# Patient Record
Sex: FEMALE | Race: WHITE
[De-identification: names, ages, dates, MRNs, and addresses within clinical notes are randomized per-mention and may not be internally consistent; named-entity substitution may affect disease eponyms.]

---

## 2017-02-22 ENCOUNTER — RECORD ABSTRACTING (OUTPATIENT)
Age: 82
End: 2017-02-22

## 2017-02-22 DIAGNOSIS — I10 ESSENTIAL (PRIMARY) HYPERTENSION: ICD-10-CM

## 2017-02-22 DIAGNOSIS — G47.33 OBSTRUCTIVE SLEEP APNEA (ADULT) (PEDIATRIC): ICD-10-CM

## 2017-02-22 DIAGNOSIS — Z92.89 PERSONAL HISTORY OF OTHER MEDICAL TREATMENT: ICD-10-CM

## 2017-02-22 DIAGNOSIS — I25.2 OLD MYOCARDIAL INFARCTION: ICD-10-CM

## 2017-02-22 DIAGNOSIS — E78.5 HYPERLIPIDEMIA, UNSPECIFIED: ICD-10-CM

## 2017-02-22 DIAGNOSIS — R06.00 DYSPNEA, UNSPECIFIED: ICD-10-CM

## 2017-02-22 DIAGNOSIS — Z78.9 OTHER SPECIFIED HEALTH STATUS: ICD-10-CM

## 2017-03-07 ENCOUNTER — APPOINTMENT (OUTPATIENT)
Dept: ELECTROPHYSIOLOGY | Facility: CLINIC | Age: 82
End: 2017-03-07

## 2017-04-18 ENCOUNTER — APPOINTMENT (OUTPATIENT)
Dept: ELECTROPHYSIOLOGY | Facility: CLINIC | Age: 82
End: 2017-04-18

## 2017-08-16 ENCOUNTER — APPOINTMENT (OUTPATIENT)
Dept: ELECTROPHYSIOLOGY | Facility: CLINIC | Age: 82
End: 2017-08-16
Payer: MEDICARE

## 2017-08-16 VITALS
SYSTOLIC BLOOD PRESSURE: 158 MMHG | BODY MASS INDEX: 35.08 KG/M2 | WEIGHT: 198 LBS | HEART RATE: 81 BPM | HEIGHT: 63 IN | DIASTOLIC BLOOD PRESSURE: 100 MMHG

## 2017-08-16 PROCEDURE — 93281 PM DEVICE PROGR EVAL MULTI: CPT

## 2017-11-30 ENCOUNTER — APPOINTMENT (OUTPATIENT)
Dept: ELECTROPHYSIOLOGY | Facility: CLINIC | Age: 82
End: 2017-11-30
Payer: MEDICARE

## 2017-11-30 VITALS
BODY MASS INDEX: 35.08 KG/M2 | DIASTOLIC BLOOD PRESSURE: 82 MMHG | SYSTOLIC BLOOD PRESSURE: 149 MMHG | HEART RATE: 77 BPM | WEIGHT: 198 LBS | HEIGHT: 63 IN

## 2017-11-30 PROCEDURE — 93280 PM DEVICE PROGR EVAL DUAL: CPT

## 2018-02-21 NOTE — H&P ADULT - ASSESSMENT
82 yo F with PMHx of HTN, DERIAN, Afib ,s/p PPM has been c/o     & had (+) stress test  Pt referred for C with possible intervention.

## 2018-02-21 NOTE — H&P ADULT - PMH
Afib    HTN (hypertension)    Sleep apnea Afib    Carotid artery disease    HTN (hypertension)    Hyperlipidemia    Pacemaker    Sleep apnea    SSS (sick sinus syndrome)

## 2018-02-21 NOTE — H&P ADULT - NSHPPHYSICALEXAM_GEN_ALL_CORE
Vital Signs : BP        HR       RR    Constitutional: well developed, well nourished, no deformities and no acute distress    Neurological: Alert & Oriented x 3, FARIA, no focal deficits    HEENT: NC/AT, PERRLA, EOMI,  Neck supple.    Respiratory: CTA B/L, No wheezing/crackles/rhonchi    Cardiovascular: (+) S1 & S2, RRR, No m/r/g    Gastrointestinal: soft, NT, nondistended, (+) BS    Genitourinary: non distended bladder, voiding freely    Extremities: No pedal edema, No clubbing, No cyanosis    Skin:  normal skin color and pigmentation, no skin lesions Constitutional: well developed, well nourished, no deformities and no acute distress    Neurological: Alert & Oriented x 3, FARIA, no focal deficits    HEENT: NC/AT, PERRLA, EOMI,  Neck supple.    Respiratory: CTA B/L, No wheezing/crackles/rhonchi    Cardiovascular: (+) S1 & S2, RRR, No m/r/g    Gastrointestinal: soft, NT, nondistended, (+) BS    Genitourinary: non distended bladder, voiding freely    Extremities: + trace pedal edema, + discoloration, No clubbing, No cyanosis    Skin:  normal skin color and pigmentation, no skin lesions

## 2018-02-21 NOTE — H&P ADULT - PROBLEM SELECTOR PLAN 1
Pt is referred for Lt heart cath/possible PCI. Labs & medications are reviewed. Informed consent obtained after discussion of [facility] risks, benefits and alternatives  with patient. Risk discussed included, but not limited to MI, stroke, mortality, major bleeding, arrhythmia, or infection.  An educational material provided. Pt. verbalizes understandings of pre-procedural instructions.

## 2018-02-21 NOTE — H&P ADULT - FAMILY HISTORY
Father  Still living? Unknown  CAD (coronary artery disease), Age at diagnosis: Age Unknown     Mother  Still living? No  CAD (coronary artery disease), Age at diagnosis: Age Unknown     Sibling  Still living? Unknown  Family history of breast cancer in sister, Age at diagnosis: Age Unknown

## 2018-02-21 NOTE — H&P ADULT - HISTORY OF PRESENT ILLNESS
82 yo F with PMHx of HTN, DERIAN, Afib ,s/p PPM has been c/o     & had (+) stress test  Pt referred for C with possible intervention. 84 yo F with PMHx of HTN, DERIAN, Afib ,s/p PPM has been c/o fatigue and mild shortness of breath    & had (+) stress test revealing apical wall ischemia  Pt referred for LHC with possible intervention.

## 2018-02-21 NOTE — H&P ADULT - NSHPREVIEWOFSYSTEMS_GEN_ALL_CORE
General: Pt denies recent weight loss/fever/chills    Neurological: denies numbness or  sensation loss    HEENT: denies visual changes, no hearing loss, denies sore throat    Cardiovascular: denies chest pain/palpitations/leg edema    Respiratory and Thorax: denies SOB/cough/wheezing    Gastrointestinal: denies abdominal pain/diarrhea/constipation/bloody stool    Genitourinary: denies urinary frequency/urgency/ dysuria    Musculoskeletal: denies joint pain or swelling, denies restricted motion    Skin: denies rashes/sores    Endocrine: denies heat or cold intolerance/excessive thirst    Hematologic: denies abnormal bleeding General: Pt denies recent weight loss/fever/chills    Neurological: denies numbness or  sensation loss    HEENT: denies visual changes, no hearing loss, denies sore throat    Cardiovascular: denies chest pain/palpitations/  + leg edema    Respiratory and Thorax: + SOB, denies cough/wheezing    Gastrointestinal: denies abdominal pain/diarrhea/constipation/bloody stool    Genitourinary: denies urinary frequency/urgency/ dysuria    Musculoskeletal: + joint pain, + swelling, + restricted motion (uses cane)    Skin: denies rashes/sores    Endocrine: denies heat or cold intolerance/excessive thirst    Hematologic: denies abnormal bleeding

## 2018-02-22 ENCOUNTER — OUTPATIENT (OUTPATIENT)
Dept: OUTPATIENT SERVICES | Facility: HOSPITAL | Age: 83
LOS: 1 days | Discharge: HOME | End: 2018-02-22
Payer: MEDICARE

## 2018-02-22 VITALS
HEIGHT: 63 IN | OXYGEN SATURATION: 97 % | DIASTOLIC BLOOD PRESSURE: 64 MMHG | WEIGHT: 197.98 LBS | SYSTOLIC BLOOD PRESSURE: 133 MMHG | HEART RATE: 60 BPM | RESPIRATION RATE: 16 BRPM | TEMPERATURE: 99 F

## 2018-02-22 DIAGNOSIS — Z95.0 PRESENCE OF CARDIAC PACEMAKER: Chronic | ICD-10-CM

## 2018-02-22 LAB
BLD GP AB SCN SERPL QL: SIGNIFICANT CHANGE UP
TYPE + AB SCN PNL BLD: SIGNIFICANT CHANGE UP

## 2018-02-22 PROCEDURE — 93010 ELECTROCARDIOGRAM REPORT: CPT | Mod: 76

## 2018-02-22 RX ORDER — CARVEDILOL PHOSPHATE 80 MG/1
12.5 CAPSULE, EXTENDED RELEASE ORAL EVERY 12 HOURS
Qty: 0 | Refills: 0 | Status: DISCONTINUED | OUTPATIENT
Start: 2018-02-22 | End: 2018-02-23

## 2018-02-22 RX ORDER — ASPIRIN/CALCIUM CARB/MAGNESIUM 324 MG
81 TABLET ORAL DAILY
Qty: 0 | Refills: 0 | Status: DISCONTINUED | OUTPATIENT
Start: 2018-02-23 | End: 2018-02-23

## 2018-02-22 RX ORDER — WARFARIN SODIUM 2.5 MG/1
3 TABLET ORAL ONCE
Qty: 0 | Refills: 0 | Status: COMPLETED | OUTPATIENT
Start: 2018-02-22 | End: 2018-02-22

## 2018-02-22 RX ORDER — LOSARTAN POTASSIUM 100 MG/1
100 TABLET, FILM COATED ORAL DAILY
Qty: 0 | Refills: 0 | Status: DISCONTINUED | OUTPATIENT
Start: 2018-02-22 | End: 2018-02-23

## 2018-02-22 RX ORDER — POTASSIUM CHLORIDE 20 MEQ
10 PACKET (EA) ORAL DAILY
Qty: 0 | Refills: 0 | Status: DISCONTINUED | OUTPATIENT
Start: 2018-02-22 | End: 2018-02-23

## 2018-02-22 RX ORDER — ACETAMINOPHEN 500 MG
650 TABLET ORAL EVERY 6 HOURS
Qty: 0 | Refills: 0 | Status: DISCONTINUED | OUTPATIENT
Start: 2018-02-22 | End: 2018-02-23

## 2018-02-22 RX ORDER — FUROSEMIDE 40 MG
40 TABLET ORAL DAILY
Qty: 0 | Refills: 0 | Status: DISCONTINUED | OUTPATIENT
Start: 2018-02-22 | End: 2018-02-23

## 2018-02-22 RX ORDER — SODIUM CHLORIDE 9 MG/ML
1000 INJECTION INTRAMUSCULAR; INTRAVENOUS; SUBCUTANEOUS
Qty: 0 | Refills: 0 | Status: DISCONTINUED | OUTPATIENT
Start: 2018-02-22 | End: 2018-02-23

## 2018-02-22 RX ORDER — ATORVASTATIN CALCIUM 80 MG/1
20 TABLET, FILM COATED ORAL AT BEDTIME
Qty: 0 | Refills: 0 | Status: DISCONTINUED | OUTPATIENT
Start: 2018-02-22 | End: 2018-02-23

## 2018-02-22 RX ORDER — CLOPIDOGREL BISULFATE 75 MG/1
75 TABLET, FILM COATED ORAL DAILY
Qty: 0 | Refills: 0 | Status: DISCONTINUED | OUTPATIENT
Start: 2018-02-23 | End: 2018-02-23

## 2018-02-22 RX ADMIN — Medication 10 MILLIEQUIVALENT(S): at 13:18

## 2018-02-22 RX ADMIN — WARFARIN SODIUM 3 MILLIGRAM(S): 2.5 TABLET ORAL at 21:13

## 2018-02-22 RX ADMIN — ATORVASTATIN CALCIUM 20 MILLIGRAM(S): 80 TABLET, FILM COATED ORAL at 21:13

## 2018-02-22 RX ADMIN — CARVEDILOL PHOSPHATE 12.5 MILLIGRAM(S): 80 CAPSULE, EXTENDED RELEASE ORAL at 18:05

## 2018-02-22 NOTE — ASU PATIENT PROFILE, ADULT - PMH
Afib    Carotid artery disease    HTN (hypertension)    Hyperlipidemia    Pacemaker    Sleep apnea    SSS (sick sinus syndrome)

## 2018-02-22 NOTE — ASU PATIENT PROFILE, ADULT - NS TRANSFER PATIENT BELONGINGS
Other belongings/Clothing/3 gold colored rings; 1 gold colored necklace; 1 black cane/Jewelry/Money (specify)

## 2018-02-22 NOTE — PROGRESS NOTE ADULT - SUBJECTIVE AND OBJECTIVE BOX
Nurse Practitioner Progress note:   s/p [facility] with successful PCI and STEFANIE to ostial RCA      Patient feels well.  Denies chest pain, shortness of breath, dizziness or palpitations at this time    Right groin procedure site PERCLOSE dressing CDI.  no bleeding, no hematoma, site soft, non tender, positive pedal pulses bilaterally        T(C): 37.1 (02-22-18 @ 08:04), Max: 37.1 (02-22-18 @ 08:04)  HR: 60 (02-22-18 @ 10:50) (60 - 61)  BP: 144/64 (02-22-18 @ 10:50) (133/64 - 144/72)  RR: 16 (02-22-18 @ 10:50) (16 - 16)  SpO2: 95% (02-22-18 @ 10:50) (95% - 97%)      MEDICATIONS  (STANDING):  aspirin 81mg PO daily  plavix 75mg PO daily  atorvastatin 20 milliGRAM(s) Oral at bedtime  carvedilol 12.5 milliGRAM(s) Oral every 12 hours  furosemide    Tablet 40 milliGRAM(s) Oral daily  losartan 100 milliGRAM(s) Oral daily  potassium chloride    Tablet ER 10 milliEquivalent(s) Oral daily  sodium chloride 0.9%. 1000 milliLiter(s) (75 mL/Hr) IV Continuous <Continuous>  warfarin 3 milliGRAM(s) Oral once      HPI:  84 yo F with PMHx of HTN, DERIAN, Afib ,s/p PPM has been c/o fatigue and mild shortness of breath    & had (+) stress test revealing apical wall ischemia  s/p [facility] with successful PCI and STEFANIE to ostial RCA      ASSESSMENT/PLAN:    Coronary artery disease  -observation overnight as outpatient in CICU  -VS, labs, diet, activity as per PCI orders  -IV hydration  -Encourage PO fluids  -continue DAPT, BB, ARB, statin, diuretic, potassium and restart coumadin tonight  -Plan of care discussed with patient, daughter and MD Rodriguez  -likely d/c in AM if patient remains stable overnight  -NP to see in AM to evaluate labs, EKG and procedure site check  -Follow-up with attending MD De León within 1 week  -Discussed therapeutic lifestyle changes to reduce risk factors such as following a cardiac diet, weight loss, maintaining a healthy weight, exercise, smoking cessation, medication compliance, and regular follow-up  with MD to know your numbers (BP, cholesterol, weight, and glucose)

## 2018-02-22 NOTE — PACU DISCHARGE NOTE - COMMENTS
Report given to Nidia, receiving RN on CICU.  Pt. transferred to inpt. room in CICU on cardiac monitor via stretcher accompanied by RN and transport tech.

## 2018-02-23 ENCOUNTER — TRANSCRIPTION ENCOUNTER (OUTPATIENT)
Age: 83
End: 2018-02-23

## 2018-02-23 VITALS — DIASTOLIC BLOOD PRESSURE: 59 MMHG | SYSTOLIC BLOOD PRESSURE: 130 MMHG | HEART RATE: 61 BPM

## 2018-02-23 LAB
ANION GAP SERPL CALC-SCNC: 6 MMOL/L — SIGNIFICANT CHANGE UP (ref 5–17)
BASOPHILS # BLD AUTO: 0.1 K/UL — SIGNIFICANT CHANGE UP (ref 0–0.2)
BASOPHILS NFR BLD AUTO: 1 % — SIGNIFICANT CHANGE UP (ref 0–2)
BUN SERPL-MCNC: 22 MG/DL — SIGNIFICANT CHANGE UP (ref 7–23)
CALCIUM SERPL-MCNC: 8.9 MG/DL — SIGNIFICANT CHANGE UP (ref 8.5–10.1)
CHLORIDE SERPL-SCNC: 110 MMOL/L — HIGH (ref 96–108)
CHOLEST SERPL-MCNC: 112 MG/DL — SIGNIFICANT CHANGE UP (ref 10–199)
CO2 SERPL-SCNC: 24 MMOL/L — SIGNIFICANT CHANGE UP (ref 22–31)
CREAT SERPL-MCNC: 0.7 MG/DL — SIGNIFICANT CHANGE UP (ref 0.5–1.3)
EOSINOPHIL # BLD AUTO: 0.2 K/UL — SIGNIFICANT CHANGE UP (ref 0–0.5)
EOSINOPHIL NFR BLD AUTO: 1.7 % — SIGNIFICANT CHANGE UP (ref 0–6)
GLUCOSE SERPL-MCNC: 111 MG/DL — HIGH (ref 70–99)
HCT VFR BLD CALC: 39.5 % — SIGNIFICANT CHANGE UP (ref 34.5–45)
HDLC SERPL-MCNC: 39 MG/DL — LOW (ref 40–125)
HGB BLD-MCNC: 13 G/DL — SIGNIFICANT CHANGE UP (ref 11.5–15.5)
INR BLD: 1.6 RATIO — HIGH (ref 0.88–1.16)
LIPID PNL WITH DIRECT LDL SERPL: 56 MG/DL — SIGNIFICANT CHANGE UP
LYMPHOCYTES # BLD AUTO: 1.1 K/UL — SIGNIFICANT CHANGE UP (ref 1–3.3)
LYMPHOCYTES # BLD AUTO: 11.6 % — LOW (ref 13–44)
MCHC RBC-ENTMCNC: 30.2 PG — SIGNIFICANT CHANGE UP (ref 27–34)
MCHC RBC-ENTMCNC: 33 GM/DL — SIGNIFICANT CHANGE UP (ref 32–36)
MCV RBC AUTO: 91.6 FL — SIGNIFICANT CHANGE UP (ref 80–100)
MONOCYTES # BLD AUTO: 0.7 K/UL — SIGNIFICANT CHANGE UP (ref 0–0.9)
MONOCYTES NFR BLD AUTO: 7.1 % — SIGNIFICANT CHANGE UP (ref 2–14)
NEUTROPHILS # BLD AUTO: 7.3 K/UL — SIGNIFICANT CHANGE UP (ref 1.8–7.4)
NEUTROPHILS NFR BLD AUTO: 78.5 % — HIGH (ref 43–77)
PLATELET # BLD AUTO: 244 K/UL — SIGNIFICANT CHANGE UP (ref 150–400)
POTASSIUM SERPL-MCNC: 4.3 MMOL/L — SIGNIFICANT CHANGE UP (ref 3.5–5.3)
POTASSIUM SERPL-SCNC: 4.3 MMOL/L — SIGNIFICANT CHANGE UP (ref 3.5–5.3)
PROTHROM AB SERPL-ACNC: 17.4 SEC — HIGH (ref 9.8–12.7)
RBC # BLD: 4.31 M/UL — SIGNIFICANT CHANGE UP (ref 3.8–5.2)
RBC # FLD: 16.2 % — HIGH (ref 10.3–14.5)
SODIUM SERPL-SCNC: 140 MMOL/L — SIGNIFICANT CHANGE UP (ref 135–145)
TOTAL CHOLESTEROL/HDL RATIO MEASUREMENT: 2.9 RATIO — LOW (ref 3.3–7.1)
TRIGL SERPL-MCNC: 86 MG/DL — SIGNIFICANT CHANGE UP (ref 10–149)
WBC # BLD: 9.3 K/UL — SIGNIFICANT CHANGE UP (ref 3.8–10.5)
WBC # FLD AUTO: 9.3 K/UL — SIGNIFICANT CHANGE UP (ref 3.8–10.5)

## 2018-02-23 PROCEDURE — 93010 ELECTROCARDIOGRAM REPORT: CPT

## 2018-02-23 RX ORDER — CLOPIDOGREL BISULFATE 75 MG/1
1 TABLET, FILM COATED ORAL
Qty: 30 | Refills: 6
Start: 2018-02-23 | End: 2018-09-20

## 2018-02-23 RX ORDER — ASPIRIN/CALCIUM CARB/MAGNESIUM 324 MG
1 TABLET ORAL
Qty: 0 | Refills: 0 | DISCHARGE
Start: 2018-02-23

## 2018-02-23 RX ADMIN — Medication 10 MILLIEQUIVALENT(S): at 11:01

## 2018-02-23 RX ADMIN — CARVEDILOL PHOSPHATE 12.5 MILLIGRAM(S): 80 CAPSULE, EXTENDED RELEASE ORAL at 11:01

## 2018-02-23 RX ADMIN — CLOPIDOGREL BISULFATE 75 MILLIGRAM(S): 75 TABLET, FILM COATED ORAL at 11:01

## 2018-02-23 RX ADMIN — LOSARTAN POTASSIUM 100 MILLIGRAM(S): 100 TABLET, FILM COATED ORAL at 11:01

## 2018-02-23 RX ADMIN — Medication 40 MILLIGRAM(S): at 05:59

## 2018-02-23 RX ADMIN — Medication 81 MILLIGRAM(S): at 11:01

## 2018-02-23 NOTE — DISCHARGE NOTE ADULT - HOSPITAL COURSE
82 yo F with PMHx of HTN, DERIAN, Afib ,s/p PPM has been c/o fatigue and mild shortness of breath & had (+) stress test revealing apical wall ischemia.  S/P LHC

## 2018-02-23 NOTE — DISCHARGE NOTE ADULT - OTHER SIGNIFICANT FINDINGS
Cardiac Cath Lab - Adult (02.22.18 @ 09:56) >  Impression     Diagnostic Conclusions   One Vessel coronary artery disease (RCA) .   Normal LV systolic function. Estimated LV ejection fraction is 65 %.   No aortic valve stenosis.     Interventional Conclusions     Successful Coronary Intervention STEFANIE of ostial RCA.     Recommendations     Percutaneous coronary intervention of RCA today - symptomatic stenosis.     Aggressive medical management of coronary artery disease and its   underlying risk factors.

## 2018-02-23 NOTE — DISCHARGE NOTE ADULT - CARE PROVIDER_API CALL
Johanna De León), Cardiac Electrophysiology; Cardiovascular Disease; Internal Medicine  [address] [place] [address]  Phone: [phone]  Fax: [phone]

## 2018-02-23 NOTE — DISCHARGE NOTE ADULT - PLAN OF CARE
Pt. will remain chest pain free Continue to take ASA   Continue to take Plavix  Follow-up with cardiologist

## 2018-02-23 NOTE — DISCHARGE NOTE ADULT - PATIENT PORTAL LINK FT
You can access the [facility] Patient Portal, offered by [facility], by registering with the following website: [email or web address]

## 2018-02-23 NOTE — DISCHARGE NOTE ADULT - CARE PLAN
Principal Discharge DX:	CAD (coronary artery disease)  Goal:	Pt. will remain chest pain free  Assessment and plan of treatment:	Continue to take ASA   Continue to take Plavix  Follow-up with cardiologist

## 2018-02-23 NOTE — PROGRESS NOTE ADULT - ASSESSMENT
ASSESSMENT/PLAN: 	  84 yo F with PMHx of HTN, DERIAN, Afib ,s/p PPM has been c/o fatigue and mild shortness of breath & had (+) stress test revealing apical wall ischemia.  S/P C     -Aggressive medical management of coronary artery disease and its underlying risk factors.  -Aspirin 81 mg PO daily.  -(Plavix) 75 mg PO daily.  -Restart Coumadin  -Once INR >2 stop aspirin.  -Continue current medications  -Cozaar 100 mg   -Coreg 12.5 mg BID  -Lipitor 20 mg   -Pt. to be discharged home today  -Plan of care D/W pt. and MD  -Post cath instructions reviewed with pt., pt. verbalizes and understands instructions  -Follow-up with cardiologist in 7-10 days ASSESSMENT/PLAN: 	  82 yo F with PMHx of HTN, DERIAN, Afib ,s/p PPM has been c/o fatigue and mild shortness of breath & had (+) stress test revealing apical wall ischemia.  S/P LHC     -Aggressive medical management of coronary artery disease and its underlying risk factors.  -Aspirin 81 mg PO daily.  -(Plavix) 75 mg PO daily.  -Restart Coumadin  -Once INR >2 stop aspirin.  -Continue current medications  -Cozaar 100 mg   -Coreg 12.5 mg BID  -Lipitor 20 mg   -Pt. to be discharged home today  -Plan of care D/W pt. and MD  -Post cath instructions reviewed with pt., pt. verbalizes and understands instructions  -Discussed therapeutic lifestyle changes to reduce risk factors such as following a cardiac diet, weight loss, maintaining a healthy weight, exercise, smoking cessation, medication compliance, and regular follow-up  with MD to know our numbers (BP, cholesterol, weight, and glucose  -Follow-up with cardiologist in 7-10 days

## 2018-02-23 NOTE — DISCHARGE NOTE ADULT - MEDICATION SUMMARY - MEDICATIONS TO TAKE
I will START or STAY ON the medications listed below when I get home from the hospital:    aspirin 81 mg oral delayed release tablet  -- 1 tab(s) by mouth once a day  -- Indication: For CAD (coronary artery disease)    losartan 100 mg oral tablet  -- 1 tab(s) by mouth once a day  -- Indication: For HTN (hypertension)    Coumadin 3 mg oral tablet  -- 1 tab(s) by mouth every other day; last taken Sat.  -- Indication: For Afib    Coumadin 2.5 mg oral tablet  -- 1 tab(s) by mouth every other day  -- Indication: For Afib    rosuvastatin 5 mg oral tablet  -- 1 tab(s) by mouth once a day (at bedtime)  -- Indication: For HLD    clopidogrel 75 mg oral tablet  -- 1 tab(s) by mouth once a day  -- Indication: For CAD (coronary artery disease)    carvedilol 12.5 mg oral tablet  -- 1 tab(s) by mouth 2 times a day  -- Indication: For HTN (hypertension)    furosemide 40 mg oral tablet  -- 1 tab(s) by mouth once a day  -- Indication: For HTN (hypertension)    Micro-K  -- 10 milliequivalent(s) by mouth once a day  -- Indication: For Supplement    CoQ10  -- 100 milligram(s) by mouth once a day  -- Indication: For Supplement    latanoprost 0.005% ophthalmic solution  -- 1 drop(s) to each affected eye once a day (in the evening)  -- Indication: For glaucoma

## 2018-02-23 NOTE — PROGRESS NOTE ADULT - SUBJECTIVE AND OBJECTIVE BOX
Nurse Practitioner Progress note:     HPI:  82 yo F with PMHx of HTN, DERIAN, Afib ,s/p PPM has been c/o fatigue and mild shortness of breath & had (+) stress test revealing apical wall ischemia  Pt referred for [facility] with possible intervention. (21 Feb 2018 10:36)      T(C): 36.4 (02-22-18 @ 23:35), Max: 37.1 (02-22-18 @ 08:04)  HR: 60 (02-23-18 @ 06:00) (60 - 68)  BP: 104/81 (02-23-18 @ 06:00) (102/54 - 147/69)  RR: 16 (02-22-18 @ 14:00) (16 - 18)  SpO2: 91% (02-23-18 @ 06:00) (91% - 97%)  Wt(kg): --    PHYSICAL EXAM:  Neurologic: Non-focal, AxOx3.  No neuro deficits  Vascular: Peripheral pulses palpable 2+ bilaterally  Procedure Site: Rt. groin perclose closure device site benign soft no bleeding no hematoma +1PP    12 lead EKG:  	    LABS:	 	      PROCEDURE RESULTS:   Cardiac Cath Lab - Adult (02.22.18 @ 09:56) >   Diagnostic Conclusions   One Vessel coronary artery disease (RCA) .   Normal LV systolic function. Estimated LV ejection fraction is 65 %.   No aortic valve stenosis.   Interventional Conclusions   Successful Coronary Intervention STEFANIE of ostial RCA.   Percutaneous coronary intervention of RCA today - symptomatic stenosis.      ASSESSMENT/PLAN: 	  82 yo F with PMHx of HTN, DERIAN, Afib ,s/p PPM has been c/o fatigue and mild shortness of breath & had (+) stress test revealing apical wall ischemia.  S/P LHC     -Aggressive medical management of coronary artery disease and its underlying risk factors.  -Aspirin 81 mg PO daily.  -(Plavix) 75 mg PO daily.  -Restart Coumadin  -Once INR >2 stop aspirin.  -Continue current medications  -Cozaar 100 mg   -Coreg 12.5 mg BID  -Lipitor 20 mg   -Pt. to be discharged home today  -Plan of care D/W pt. and MD  -Post cath instructions reviewed with pt., pt. verbalizes and understands instructions  -Follow-up with cardiologist in 7-10 days Nurse Practitioner Progress note:     HPI:  82 yo F with PMHx of HTN, DERIAN, Afib ,s/p PPM has been c/o fatigue and mild shortness of breath & had (+) stress test revealing apical wall ischemia  Pt referred for [facility] with possible intervention. (21 Feb 2018 10:36)      T(C): 36.4 (02-22-18 @ 23:35), Max: 37.1 (02-22-18 @ 08:04)  HR: 60 (02-23-18 @ 06:00) (60 - 68)  BP: 104/81 (02-23-18 @ 06:00) (102/54 - 147/69)  RR: 16 (02-22-18 @ 14:00) (16 - 18)  SpO2: 91% (02-23-18 @ 06:00) (91% - 97%)  Wt(kg): --    PHYSICAL EXAM:  Neurologic: Non-focal, AxOx3.  No neuro deficits  Vascular: Peripheral pulses palpable 2+ bilaterally  Procedure Site: Rt. groin perclose closure device site benign soft no bleeding no hematoma +1PP    12 lead EKG:  	    LABS:	 	                        13.0   9.3   )-----------( 244      ( 23 Feb 2018 04:45 )             39.5   02-23    140  |  110<H>  |  22  ----------------------------<  111<H>  4.3   |  24  |  0.70    Ca    8.9      23 Feb 2018 04:45         PROCEDURE RESULTS:   Cardiac Cath Lab - Adult (02.22.18 @ 09:56) >   Diagnostic Conclusions   One Vessel coronary artery disease (RCA) .   Normal LV systolic function. Estimated LV ejection fraction is 65 %.   No aortic valve stenosis.   Interventional Conclusions   Successful Coronary Intervention STEFANIE of ostial RCA.   Percutaneous coronary intervention of RCA today - symptomatic stenosis. Nurse Practitioner Progress note:     HPI:  84 yo F with PMHx of HTN, DERIAN, Afib ,s/p PPM has been c/o fatigue and mild shortness of breath & had (+) stress test revealing apical wall ischemia  Pt referred for [facility] with possible intervention. (21 Feb 2018 10:36)      T(C): 36.4 (02-22-18 @ 23:35), Max: 37.1 (02-22-18 @ 08:04)  HR: 60 (02-23-18 @ 06:00) (60 - 68)  BP: 104/81 (02-23-18 @ 06:00) (102/54 - 147/69)  RR: 16 (02-22-18 @ 14:00) (16 - 18)  SpO2: 91% (02-23-18 @ 06:00) (91% - 97%)  Wt(kg): --    PHYSICAL EXAM:  Neurologic: Non-focal, AxOx3.  No neuro deficits  Vascular: Peripheral pulses palpable 2+ bilaterally  Procedure Site: Rt. groin perclose closure device site benign soft no bleeding no hematoma +1PP    12 lead EKG:  	    Overnight on Telemetry:     LABS:	 	                        13.0   9.3   )-----------( 244      ( 23 Feb 2018 04:45 )             39.5   02-23    140  |  110<H>  |  22  ----------------------------<  111<H>  4.3   |  24  |  0.70    Ca    8.9      23 Feb 2018 04:45         PROCEDURE RESULTS:   Cardiac Cath Lab - Adult (02.22.18 @ 09:56) >   Diagnostic Conclusions   One Vessel coronary artery disease (RCA) .   Normal LV systolic function. Estimated LV ejection fraction is 65 %.   No aortic valve stenosis.   Interventional Conclusions   Successful Coronary Intervention STEFANIE of ostial RCA.   Percutaneous coronary intervention of RCA today - symptomatic stenosis. Nurse Practitioner Progress note:     HPI:  84 yo F with PMHx of HTN, DERIAN, Afib ,s/p PPM has been c/o fatigue and mild shortness of breath & had (+) stress test revealing apical wall ischemia  Pt referred for [facility] with possible intervention. (21 Feb 2018 10:36)      T(C): 36.4 (02-22-18 @ 23:35), Max: 37.1 (02-22-18 @ 08:04)  HR: 60 (02-23-18 @ 06:00) (60 - 68)  BP: 104/81 (02-23-18 @ 06:00) (102/54 - 147/69)  RR: 16 (02-22-18 @ 14:00) (16 - 18)  SpO2: 91% (02-23-18 @ 06:00) (91% - 97%)  Wt(kg): --    PHYSICAL EXAM:  Neurologic: Non-focal, AxOx3.  No neuro deficits  Resp:   Vascular: Peripheral pulses palpable 2+ bilaterally  Procedure Site: Rt. groin perclose closure device site benign soft no bleeding no hematoma +1PP    12 lead EKG:  	    Overnight on Telemetry:     LABS:	 	                        13.0   9.3   )-----------( 244      ( 23 Feb 2018 04:45 )             39.5   02-23    140  |  110<H>  |  22  ----------------------------<  111<H>  4.3   |  24  |  0.70    Ca    8.9      23 Feb 2018 04:45         PROCEDURE RESULTS:   Cardiac Cath Lab - Adult (02.22.18 @ 09:56) >   Diagnostic Conclusions   One Vessel coronary artery disease (RCA) .   Normal LV systolic function. Estimated LV ejection fraction is 65 %.   No aortic valve stenosis.   Interventional Conclusions   Successful Coronary Intervention STEFANIE of ostial RCA.   Percutaneous coronary intervention of RCA today - symptomatic stenosis. Nurse Practitioner Progress note:     HPI:  82 yo F with PMHx of HTN, DERIAN, Afib ,s/p PPM has been c/o fatigue and mild shortness of breath & had (+) stress test revealing apical wall ischemia  Pt referred for [facility] with possible intervention. (21 Feb 2018 10:36)      T(C): 36.4 (02-22-18 @ 23:35), Max: 37.1 (02-22-18 @ 08:04)  HR: 60 (02-23-18 @ 06:00) (60 - 68)  BP: 104/81 (02-23-18 @ 06:00) (102/54 - 147/69)  RR: 16 (02-22-18 @ 14:00) (16 - 18)  SpO2: 91% (02-23-18 @ 06:00) (91% - 97%)  Wt(kg): --    PHYSICAL EXAM:  Neurologic: Non-focal, AxOx3.  No neuro deficits  Resp:   Cardiovascular:  Vascular: Peripheral pulses palpable 2+ bilaterally  Procedure Site: Rt. groin perclose closure device site benign soft no bleeding no hematoma +1PP    12 lead EKG:  	    Overnight on Telemetry:     LABS:	 	                        13.0   9.3   )-----------( 244      ( 23 Feb 2018 04:45 )             39.5   02-23    140  |  110<H>  |  22  ----------------------------<  111<H>  4.3   |  24  |  0.70    Ca    8.9      23 Feb 2018 04:45         PROCEDURE RESULTS:   Cardiac Cath Lab - Adult (02.22.18 @ 09:56) >   Diagnostic Conclusions   One Vessel coronary artery disease (RCA) .   Normal LV systolic function. Estimated LV ejection fraction is 65 %.   No aortic valve stenosis.   Interventional Conclusions   Successful Coronary Intervention STEFANIE of ostial RCA.   Percutaneous coronary intervention of RCA today - symptomatic stenosis. Nurse Practitioner Progress note:     HPI:  82 yo F with PMHx of HTN, DERIAN, Afib ,s/p PPM has been c/o fatigue and mild shortness of breath & had (+) stress test revealing apical wall ischemia  Pt referred for [facility] with possible intervention. (21 Feb 2018 10:36)      T(C): 36.4 (02-22-18 @ 23:35), Max: 37.1 (02-22-18 @ 08:04)  HR: 60 (02-23-18 @ 06:00) (60 - 68)  BP: 104/81 (02-23-18 @ 06:00) (102/54 - 147/69)  RR: 16 (02-22-18 @ 14:00) (16 - 18)  SpO2: 91% (02-23-18 @ 06:00) (91% - 97%)  Wt(kg): --    PHYSICAL EXAM:  Neurologic: Non-focal, AxOx3.  No neuro deficits  Resp: CTA  Cardiovascular: S1, S2  Vascular: Peripheral pulses palpable 2+ bilaterally  Procedure Site: Rt. groin perclose closure device site benign soft no bleeding no hematoma +1PP    12 lead EKG:  Paced 60bpm	    Overnight on Telemetry: Paced 60's-80's     LABS:	 	                        13.0   9.3   )-----------( 244      ( 23 Feb 2018 04:45 )             39.5   02-23    140  |  110<H>  |  22  ----------------------------<  111<H>  4.3   |  24  |  0.70    Ca    8.9      23 Feb 2018 04:45         PROCEDURE RESULTS:   Cardiac Cath Lab - Adult (02.22.18 @ 09:56) >   Diagnostic Conclusions   One Vessel coronary artery disease (RCA) .   Normal LV systolic function. Estimated LV ejection fraction is 65 %.   No aortic valve stenosis.   Interventional Conclusions   Successful Coronary Intervention STEFANIE of ostial RCA.   Percutaneous coronary intervention of RCA today - symptomatic stenosis.

## 2018-03-01 ENCOUNTER — APPOINTMENT (OUTPATIENT)
Dept: ELECTROPHYSIOLOGY | Facility: CLINIC | Age: 83
End: 2018-03-01
Payer: MEDICARE

## 2018-03-01 VITALS — HEART RATE: 85 BPM | HEIGHT: 63 IN | DIASTOLIC BLOOD PRESSURE: 93 MMHG | SYSTOLIC BLOOD PRESSURE: 156 MMHG

## 2018-03-01 PROBLEM — G47.30 SLEEP APNEA, UNSPECIFIED: Chronic | Status: ACTIVE | Noted: 2018-02-21

## 2018-03-01 PROBLEM — I48.91 UNSPECIFIED ATRIAL FIBRILLATION: Chronic | Status: ACTIVE | Noted: 2018-02-21

## 2018-03-01 PROBLEM — I10 ESSENTIAL (PRIMARY) HYPERTENSION: Chronic | Status: ACTIVE | Noted: 2018-02-21

## 2018-03-01 PROCEDURE — 93280 PM DEVICE PROGR EVAL DUAL: CPT

## 2018-03-02 DIAGNOSIS — I25.110 ATHEROSCLEROTIC HEART DISEASE OF NATIVE CORONARY ARTERY WITH UNSTABLE ANGINA PECTORIS: ICD-10-CM

## 2018-03-02 DIAGNOSIS — G47.33 OBSTRUCTIVE SLEEP APNEA (ADULT) (PEDIATRIC): ICD-10-CM

## 2018-03-02 DIAGNOSIS — Z88.0 ALLERGY STATUS TO PENICILLIN: ICD-10-CM

## 2018-03-02 DIAGNOSIS — Z95.0 PRESENCE OF CARDIAC PACEMAKER: ICD-10-CM

## 2018-03-02 DIAGNOSIS — I10 ESSENTIAL (PRIMARY) HYPERTENSION: ICD-10-CM

## 2018-03-02 DIAGNOSIS — I49.5 SICK SINUS SYNDROME: ICD-10-CM

## 2018-03-02 DIAGNOSIS — E78.5 HYPERLIPIDEMIA, UNSPECIFIED: ICD-10-CM

## 2018-03-02 DIAGNOSIS — I48.91 UNSPECIFIED ATRIAL FIBRILLATION: ICD-10-CM

## 2018-06-07 ENCOUNTER — APPOINTMENT (OUTPATIENT)
Dept: ELECTROPHYSIOLOGY | Facility: CLINIC | Age: 83
End: 2018-06-07
Payer: MEDICARE

## 2018-06-07 VITALS
HEART RATE: 82 BPM | DIASTOLIC BLOOD PRESSURE: 80 MMHG | SYSTOLIC BLOOD PRESSURE: 147 MMHG | WEIGHT: 198 LBS | HEIGHT: 63 IN | BODY MASS INDEX: 35.08 KG/M2

## 2018-06-07 PROCEDURE — 93279 PRGRMG DEV EVAL PM/LDLS PM: CPT

## 2018-09-13 ENCOUNTER — APPOINTMENT (OUTPATIENT)
Dept: ELECTROPHYSIOLOGY | Facility: CLINIC | Age: 83
End: 2018-09-13
Payer: MEDICARE

## 2018-09-13 VITALS
HEIGHT: 63 IN | SYSTOLIC BLOOD PRESSURE: 123 MMHG | DIASTOLIC BLOOD PRESSURE: 73 MMHG | WEIGHT: 190 LBS | BODY MASS INDEX: 33.66 KG/M2 | HEART RATE: 82 BPM

## 2018-09-13 PROBLEM — E78.5 HYPERLIPIDEMIA, UNSPECIFIED: Chronic | Status: ACTIVE | Noted: 2018-02-22

## 2018-09-13 PROBLEM — I77.9 DISORDER OF ARTERIES AND ARTERIOLES, UNSPECIFIED: Chronic | Status: ACTIVE | Noted: 2018-02-22

## 2018-09-13 PROBLEM — I49.5 SICK SINUS SYNDROME: Chronic | Status: ACTIVE | Noted: 2018-02-22

## 2018-09-13 PROBLEM — Z95.0 PRESENCE OF CARDIAC PACEMAKER: Chronic | Status: ACTIVE | Noted: 2018-02-22

## 2018-09-13 PROCEDURE — 93280 PM DEVICE PROGR EVAL DUAL: CPT

## 2018-12-17 ENCOUNTER — APPOINTMENT (OUTPATIENT)
Dept: ELECTROPHYSIOLOGY | Facility: CLINIC | Age: 83
End: 2018-12-17
Payer: MEDICARE

## 2018-12-17 PROCEDURE — 93280 PM DEVICE PROGR EVAL DUAL: CPT

## 2019-03-18 ENCOUNTER — APPOINTMENT (OUTPATIENT)
Dept: ELECTROPHYSIOLOGY | Facility: CLINIC | Age: 84
End: 2019-03-18
Payer: MEDICARE

## 2019-03-18 VITALS
SYSTOLIC BLOOD PRESSURE: 149 MMHG | HEIGHT: 63 IN | DIASTOLIC BLOOD PRESSURE: 78 MMHG | WEIGHT: 190 LBS | HEART RATE: 78 BPM | BODY MASS INDEX: 33.66 KG/M2

## 2019-03-18 PROCEDURE — 93279 PRGRMG DEV EVAL PM/LDLS PM: CPT

## 2019-06-24 ENCOUNTER — APPOINTMENT (OUTPATIENT)
Dept: ELECTROPHYSIOLOGY | Facility: CLINIC | Age: 84
End: 2019-06-24
Payer: MEDICARE

## 2019-06-24 VITALS
HEART RATE: 74 BPM | BODY MASS INDEX: 32.78 KG/M2 | WEIGHT: 185 LBS | HEIGHT: 63 IN | SYSTOLIC BLOOD PRESSURE: 153 MMHG | DIASTOLIC BLOOD PRESSURE: 86 MMHG

## 2019-06-24 PROCEDURE — 93279 PRGRMG DEV EVAL PM/LDLS PM: CPT

## 2019-10-03 ENCOUNTER — APPOINTMENT (OUTPATIENT)
Dept: ELECTROPHYSIOLOGY | Facility: CLINIC | Age: 84
End: 2019-10-03
Payer: MEDICARE

## 2019-10-03 VITALS
BODY MASS INDEX: 32.78 KG/M2 | OXYGEN SATURATION: 97 % | WEIGHT: 185 LBS | DIASTOLIC BLOOD PRESSURE: 78 MMHG | HEART RATE: 83 BPM | SYSTOLIC BLOOD PRESSURE: 146 MMHG | HEIGHT: 63 IN

## 2019-10-03 PROCEDURE — 93279 PRGRMG DEV EVAL PM/LDLS PM: CPT

## 2020-01-06 ENCOUNTER — APPOINTMENT (OUTPATIENT)
Dept: ELECTROPHYSIOLOGY | Facility: CLINIC | Age: 85
End: 2020-01-06
Payer: MEDICARE

## 2020-01-06 VITALS
HEART RATE: 79 BPM | HEIGHT: 72 IN | BODY MASS INDEX: 24.24 KG/M2 | SYSTOLIC BLOOD PRESSURE: 126 MMHG | DIASTOLIC BLOOD PRESSURE: 71 MMHG | OXYGEN SATURATION: 97 % | WEIGHT: 179 LBS

## 2020-01-06 PROCEDURE — 93279 PRGRMG DEV EVAL PM/LDLS PM: CPT

## 2020-04-24 ENCOUNTER — APPOINTMENT (OUTPATIENT)
Dept: ELECTROPHYSIOLOGY | Facility: CLINIC | Age: 85
End: 2020-04-24
Payer: MEDICARE

## 2020-04-24 PROCEDURE — 93294 REM INTERROG EVL PM/LDLS PM: CPT

## 2020-04-24 PROCEDURE — 93296 REM INTERROG EVL PM/IDS: CPT

## 2020-07-22 RX ORDER — ROSUVASTATIN CALCIUM 5 MG/1
5 TABLET, FILM COATED ORAL
Refills: 0 | Status: DISCONTINUED | COMMUNITY
End: 2020-07-22

## 2020-07-24 ENCOUNTER — APPOINTMENT (OUTPATIENT)
Dept: ELECTROPHYSIOLOGY | Facility: CLINIC | Age: 85
End: 2020-07-24

## 2020-07-27 ENCOUNTER — APPOINTMENT (OUTPATIENT)
Dept: ELECTROPHYSIOLOGY | Facility: CLINIC | Age: 85
End: 2020-07-27
Payer: MEDICARE

## 2020-07-27 VITALS
OXYGEN SATURATION: 97 % | HEIGHT: 63 IN | HEART RATE: 62 BPM | BODY MASS INDEX: 29.59 KG/M2 | DIASTOLIC BLOOD PRESSURE: 68 MMHG | SYSTOLIC BLOOD PRESSURE: 142 MMHG | WEIGHT: 167 LBS

## 2020-07-27 DIAGNOSIS — Z00.00 ENCOUNTER FOR GENERAL ADULT MEDICAL EXAMINATION W/OUT ABNORMAL FINDINGS: ICD-10-CM

## 2020-07-27 PROCEDURE — 93279 PRGRMG DEV EVAL PM/LDLS PM: CPT

## 2020-10-26 ENCOUNTER — APPOINTMENT (OUTPATIENT)
Dept: ELECTROPHYSIOLOGY | Facility: CLINIC | Age: 85
End: 2020-10-26
Payer: MEDICARE

## 2020-10-26 VITALS — DIASTOLIC BLOOD PRESSURE: 67 MMHG | SYSTOLIC BLOOD PRESSURE: 139 MMHG

## 2020-10-26 VITALS
DIASTOLIC BLOOD PRESSURE: 70 MMHG | HEART RATE: 67 BPM | BODY MASS INDEX: 24.45 KG/M2 | SYSTOLIC BLOOD PRESSURE: 150 MMHG | WEIGHT: 138 LBS | OXYGEN SATURATION: 100 % | HEIGHT: 63 IN

## 2020-10-26 PROCEDURE — 93279 PRGRMG DEV EVAL PM/LDLS PM: CPT

## 2020-10-26 RX ORDER — LOSARTAN POTASSIUM 25 MG/1
25 TABLET, FILM COATED ORAL DAILY
Qty: 90 | Refills: 0 | Status: DISCONTINUED | COMMUNITY
End: 2020-10-26

## 2020-10-26 RX ORDER — ROSUVASTATIN CALCIUM 5 MG/1
5 TABLET, FILM COATED ORAL
Refills: 0 | Status: DISCONTINUED | COMMUNITY
End: 2020-10-26

## 2020-10-26 RX ORDER — APIXABAN 5 MG/1
5 TABLET, FILM COATED ORAL
Refills: 0 | Status: DISCONTINUED | COMMUNITY
End: 2020-10-26

## 2020-10-26 RX ORDER — METOPROLOL TARTRATE 50 MG/1
50 TABLET, FILM COATED ORAL DAILY
Refills: 0 | Status: DISCONTINUED | COMMUNITY
End: 2020-10-26

## 2020-10-26 RX ORDER — METOPROLOL SUCCINATE 50 MG/1
50 TABLET, EXTENDED RELEASE ORAL
Qty: 90 | Refills: 0 | Status: ACTIVE | COMMUNITY
Start: 2020-10-26

## 2020-10-26 RX ORDER — LATANOPROST/PF 0.005 %
DROPS OPHTHALMIC (EYE)
Refills: 0 | Status: DISCONTINUED | COMMUNITY
End: 2020-10-26

## 2020-10-26 RX ORDER — SPIRONOLACTONE 25 MG/1
25 TABLET ORAL DAILY
Refills: 0 | Status: DISCONTINUED | COMMUNITY
End: 2020-10-26

## 2020-10-26 RX ORDER — CARVEDILOL 12.5 MG/1
12.5 TABLET, FILM COATED ORAL TWICE DAILY
Refills: 0 | Status: DISCONTINUED | COMMUNITY
End: 2020-10-26

## 2020-11-10 ENCOUNTER — EMERGENCY (EMERGENCY)
Facility: HOSPITAL | Age: 85
LOS: 0 days | Discharge: HOME | End: 2020-11-10
Attending: EMERGENCY MEDICINE
Payer: MEDICARE

## 2020-11-10 VITALS
SYSTOLIC BLOOD PRESSURE: 127 MMHG | DIASTOLIC BLOOD PRESSURE: 62 MMHG | RESPIRATION RATE: 16 BRPM | TEMPERATURE: 98 F | HEART RATE: 60 BPM | OXYGEN SATURATION: 100 %

## 2020-11-10 VITALS
HEIGHT: 63 IN | HEART RATE: 79 BPM | WEIGHT: 139.99 LBS | TEMPERATURE: 98 F | SYSTOLIC BLOOD PRESSURE: 165 MMHG | OXYGEN SATURATION: 99 % | DIASTOLIC BLOOD PRESSURE: 86 MMHG | RESPIRATION RATE: 18 BRPM

## 2020-11-10 DIAGNOSIS — E78.5 HYPERLIPIDEMIA, UNSPECIFIED: ICD-10-CM

## 2020-11-10 DIAGNOSIS — Z88.0 ALLERGY STATUS TO PENICILLIN: ICD-10-CM

## 2020-11-10 DIAGNOSIS — Z79.82 LONG TERM (CURRENT) USE OF ASPIRIN: ICD-10-CM

## 2020-11-10 DIAGNOSIS — M79.661 PAIN IN RIGHT LOWER LEG: ICD-10-CM

## 2020-11-10 DIAGNOSIS — M25.461 EFFUSION, RIGHT KNEE: ICD-10-CM

## 2020-11-10 DIAGNOSIS — Z20.828 CONTACT WITH AND (SUSPECTED) EXPOSURE TO OTHER VIRAL COMMUNICABLE DISEASES: ICD-10-CM

## 2020-11-10 DIAGNOSIS — Z79.01 LONG TERM (CURRENT) USE OF ANTICOAGULANTS: ICD-10-CM

## 2020-11-10 DIAGNOSIS — I10 ESSENTIAL (PRIMARY) HYPERTENSION: ICD-10-CM

## 2020-11-10 DIAGNOSIS — I48.91 UNSPECIFIED ATRIAL FIBRILLATION: ICD-10-CM

## 2020-11-10 DIAGNOSIS — G47.30 SLEEP APNEA, UNSPECIFIED: ICD-10-CM

## 2020-11-10 DIAGNOSIS — Z95.0 PRESENCE OF CARDIAC PACEMAKER: Chronic | ICD-10-CM

## 2020-11-10 DIAGNOSIS — Z95.0 PRESENCE OF CARDIAC PACEMAKER: ICD-10-CM

## 2020-11-10 LAB
ADD ON TEST-SPECIMEN IN LAB: SIGNIFICANT CHANGE UP
ALBUMIN SERPL ELPH-MCNC: 2.8 G/DL — LOW (ref 3.3–5)
ALP SERPL-CCNC: 137 U/L — HIGH (ref 40–120)
ALT FLD-CCNC: 10 U/L — LOW (ref 12–78)
ANION GAP SERPL CALC-SCNC: 7 MMOL/L — SIGNIFICANT CHANGE UP (ref 5–17)
APTT BLD: 60.3 SEC — HIGH (ref 27.5–35.5)
AST SERPL-CCNC: 18 U/L — SIGNIFICANT CHANGE UP (ref 15–37)
BASOPHILS # BLD AUTO: 0.08 K/UL — SIGNIFICANT CHANGE UP (ref 0–0.2)
BASOPHILS NFR BLD AUTO: 1 % — SIGNIFICANT CHANGE UP (ref 0–2)
BILIRUB SERPL-MCNC: 0.5 MG/DL — SIGNIFICANT CHANGE UP (ref 0.2–1.2)
BUN SERPL-MCNC: 14 MG/DL — SIGNIFICANT CHANGE UP (ref 7–23)
CALCIUM SERPL-MCNC: 9.5 MG/DL — SIGNIFICANT CHANGE UP (ref 8.5–10.1)
CHLORIDE SERPL-SCNC: 104 MMOL/L — SIGNIFICANT CHANGE UP (ref 96–108)
CO2 SERPL-SCNC: 27 MMOL/L — SIGNIFICANT CHANGE UP (ref 22–31)
CREAT SERPL-MCNC: 0.64 MG/DL — SIGNIFICANT CHANGE UP (ref 0.5–1.3)
EOSINOPHIL # BLD AUTO: 0.51 K/UL — HIGH (ref 0–0.5)
EOSINOPHIL NFR BLD AUTO: 6 % — SIGNIFICANT CHANGE UP (ref 0–6)
GLUCOSE SERPL-MCNC: 99 MG/DL — SIGNIFICANT CHANGE UP (ref 70–99)
HCT VFR BLD CALC: 42.4 % — SIGNIFICANT CHANGE UP (ref 34.5–45)
HGB BLD-MCNC: 13 G/DL — SIGNIFICANT CHANGE UP (ref 11.5–15.5)
INR BLD: 3.99 RATIO — HIGH (ref 0.88–1.16)
LYMPHOCYTES # BLD AUTO: 1.26 K/UL — SIGNIFICANT CHANGE UP (ref 1–3.3)
LYMPHOCYTES # BLD AUTO: 15 % — SIGNIFICANT CHANGE UP (ref 13–44)
MCHC RBC-ENTMCNC: 29.1 PG — SIGNIFICANT CHANGE UP (ref 27–34)
MCHC RBC-ENTMCNC: 30.7 GM/DL — LOW (ref 32–36)
MCV RBC AUTO: 94.9 FL — SIGNIFICANT CHANGE UP (ref 80–100)
MONOCYTES # BLD AUTO: 0.51 K/UL — SIGNIFICANT CHANGE UP (ref 0–0.9)
MONOCYTES NFR BLD AUTO: 6 % — SIGNIFICANT CHANGE UP (ref 2–14)
NEUTROPHILS # BLD AUTO: 6.06 K/UL — SIGNIFICANT CHANGE UP (ref 1.8–7.4)
NEUTROPHILS NFR BLD AUTO: 71 % — SIGNIFICANT CHANGE UP (ref 43–77)
NRBC # BLD: SIGNIFICANT CHANGE UP /100 WBCS (ref 0–0)
PLATELET # BLD AUTO: 469 K/UL — HIGH (ref 150–400)
POTASSIUM SERPL-MCNC: 3.4 MMOL/L — LOW (ref 3.5–5.3)
POTASSIUM SERPL-SCNC: 3.4 MMOL/L — LOW (ref 3.5–5.3)
PROT SERPL-MCNC: 6.7 GM/DL — SIGNIFICANT CHANGE UP (ref 6–8.3)
PROTHROM AB SERPL-ACNC: 43.3 SEC — HIGH (ref 10.6–13.6)
RBC # BLD: 4.47 M/UL — SIGNIFICANT CHANGE UP (ref 3.8–5.2)
RBC # FLD: 21.9 % — HIGH (ref 10.3–14.5)
SARS-COV-2 RNA SPEC QL NAA+PROBE: SIGNIFICANT CHANGE UP
SODIUM SERPL-SCNC: 138 MMOL/L — SIGNIFICANT CHANGE UP (ref 135–145)
WBC # BLD: 8.42 K/UL — SIGNIFICANT CHANGE UP (ref 3.8–10.5)
WBC # FLD AUTO: 8.42 K/UL — SIGNIFICANT CHANGE UP (ref 3.8–10.5)

## 2020-11-10 PROCEDURE — 93010 ELECTROCARDIOGRAM REPORT: CPT

## 2020-11-10 PROCEDURE — 93005 ELECTROCARDIOGRAM TRACING: CPT

## 2020-11-10 PROCEDURE — 80053 COMPREHEN METABOLIC PANEL: CPT

## 2020-11-10 PROCEDURE — 85610 PROTHROMBIN TIME: CPT

## 2020-11-10 PROCEDURE — 84550 ASSAY OF BLOOD/URIC ACID: CPT

## 2020-11-10 PROCEDURE — 73562 X-RAY EXAM OF KNEE 3: CPT | Mod: RT

## 2020-11-10 PROCEDURE — 85730 THROMBOPLASTIN TIME PARTIAL: CPT

## 2020-11-10 PROCEDURE — U0003: CPT

## 2020-11-10 PROCEDURE — 71045 X-RAY EXAM CHEST 1 VIEW: CPT

## 2020-11-10 PROCEDURE — 73551 X-RAY EXAM OF FEMUR 1: CPT | Mod: RT

## 2020-11-10 PROCEDURE — 36415 COLL VENOUS BLD VENIPUNCTURE: CPT

## 2020-11-10 PROCEDURE — 73562 X-RAY EXAM OF KNEE 3: CPT | Mod: 26,RT

## 2020-11-10 PROCEDURE — 71045 X-RAY EXAM CHEST 1 VIEW: CPT | Mod: 26

## 2020-11-10 PROCEDURE — 73502 X-RAY EXAM HIP UNI 2-3 VIEWS: CPT | Mod: 26,RT

## 2020-11-10 PROCEDURE — 73502 X-RAY EXAM HIP UNI 2-3 VIEWS: CPT | Mod: RT

## 2020-11-10 PROCEDURE — 85025 COMPLETE CBC W/AUTO DIFF WBC: CPT

## 2020-11-10 PROCEDURE — 99284 EMERGENCY DEPT VISIT MOD MDM: CPT | Mod: 25

## 2020-11-10 PROCEDURE — 73551 X-RAY EXAM OF FEMUR 1: CPT | Mod: 26,RT

## 2020-11-10 PROCEDURE — 99284 EMERGENCY DEPT VISIT MOD MDM: CPT | Mod: CS

## 2020-11-10 RX ORDER — CARVEDILOL PHOSPHATE 80 MG/1
1 CAPSULE, EXTENDED RELEASE ORAL
Qty: 0 | Refills: 0 | DISCHARGE

## 2020-11-10 RX ORDER — LOSARTAN POTASSIUM 100 MG/1
1 TABLET, FILM COATED ORAL
Qty: 0 | Refills: 0 | DISCHARGE

## 2020-11-10 RX ORDER — LIDOCAINE 4 G/100G
1 CREAM TOPICAL ONCE
Refills: 0 | Status: COMPLETED | OUTPATIENT
Start: 2020-11-10 | End: 2020-11-10

## 2020-11-10 RX ORDER — ACETAMINOPHEN 500 MG
975 TABLET ORAL ONCE
Refills: 0 | Status: COMPLETED | OUTPATIENT
Start: 2020-11-10 | End: 2020-11-10

## 2020-11-10 RX ORDER — WARFARIN SODIUM 2.5 MG/1
1 TABLET ORAL
Qty: 0 | Refills: 0 | DISCHARGE

## 2020-11-10 RX ORDER — UBIDECARENONE 100 MG
100 CAPSULE ORAL
Qty: 0 | Refills: 0 | DISCHARGE

## 2020-11-10 RX ORDER — POTASSIUM CHLORIDE 20 MEQ
40 PACKET (EA) ORAL ONCE
Refills: 0 | Status: COMPLETED | OUTPATIENT
Start: 2020-11-10 | End: 2020-11-10

## 2020-11-10 RX ORDER — POTASSIUM CHLORIDE 20 MEQ
10 PACKET (EA) ORAL
Qty: 0 | Refills: 0 | DISCHARGE

## 2020-11-10 RX ADMIN — Medication 975 MILLIGRAM(S): at 11:02

## 2020-11-10 RX ADMIN — Medication 975 MILLIGRAM(S): at 10:32

## 2020-11-10 RX ADMIN — Medication 40 MILLIGRAM(S): at 14:21

## 2020-11-10 RX ADMIN — Medication 40 MILLIEQUIVALENT(S): at 10:35

## 2020-11-10 RX ADMIN — LIDOCAINE 1 PATCH: 4 CREAM TOPICAL at 09:50

## 2020-11-10 NOTE — ED PROVIDER NOTE - OBJECTIVE STATEMENT
85yo f afib, HTN, HLD, pacemaker, p/w right lower extremity pain. Pain x 2 days but worse since 4a. took tylenol @ 4am. Pt states unable to move leg 2/2 pain not weakness. no hx of trauma. walks w/ a walker, lives w/ daughter. pain right lower ext, shoots down to foot from buttock, also pain at knee. no fevers or chills. The patient denies any weakness, numbness or paresthesias in the legs, fever, urinary or fecal incontinence, trauma or predominant night-time pain. pt currently being worked up for cancer as she has 50lb unintentional weight loss over past few months, told possible early CLL, not on meds. Dr Arenas.

## 2020-11-10 NOTE — ED PROVIDER NOTE - PHYSICAL EXAMINATION
GEN - NAD; well appearing; A+O x3   HEAD - NC/AT     EYES - EOMI, no conjunctival pallor, no scleral icterus  ENT -   mucous membranes  moist , no discharge      NECK - Neck supple  PULM - CTA b/l,  symmetric breath sounds  COR -  RRR, S1 S2, no murmurs  ABD - , ND, NT, soft, no guarding, no rebound, no masses    BACK - no CVA tenderness, nontender spine     EXTREMS -RLE pain w/ hip flexion, pain w/ knee flexion, TTP knee, no deformity.   SKIN - no rash or bruising      NEUROLOGIC - alert, sensation nl, motor 5/5 RUE/LUE/RLE/LLE

## 2020-11-10 NOTE — ED ADULT NURSE NOTE - NSIMPLEMENTINTERV_GEN_ALL_ED
Implemented All Fall with Harm Risk Interventions:  [place] to call system. Call bell, personal items and telephone within reach. Instruct patient to call for assistance. Room bathroom lighting operational. Non-slip footwear when patient is off stretcher. Physically safe environment: no spills, clutter or unnecessary equipment. Stretcher in lowest position, wheels locked, appropriate side rails in place. Provide visual cue, wrist band, yellow gown, etc. Monitor gait and stability. Monitor for mental status changes and reorient to person, place, and time. Review medications for side effects contributing to fall risk. Reinforce activity limits and safety measures with patient and family. Provide visual clues: red socks.

## 2020-11-10 NOTE — ED ADULT TRIAGE NOTE - CHIEF COMPLAINT QUOTE
Patient comes to ED for Right knee and shoulder pain for 3-4 days. pt has a hx of arthritis. patient reports last night and this morning complaining of right leg weakness, pain and some numbness. Patient at baseline walks with a walker but  this morning was unable. Patient is AxOx4. Patient reports seeing multiple doctors for loosing 50 pounds in the last 3-4 months. no Code stroke per MD Gautam

## 2020-11-10 NOTE — ED PROVIDER NOTE - PROGRESS NOTE DETAILS
CC:  Received signout from Dr. Gautam at shift change for f/u SW discharge arrangements incl. VNS services.  VNS f/u arranged for tomorrow, pt's daughter agreeable to stay with daughter next 2 - 3 dd.  Pt already medically cleared for D/c by Dr. Gautam.

## 2020-11-10 NOTE — ED PROVIDER NOTE - NS ED ROS FT
Gen: No fever, normal appetite  Eyes: No eye irritation or discharge  ENT: No ear pain, congestion, sore throat  Resp: No cough or trouble breathing  Cardiovascular: No chest pain or palpitation  Gastroenteric: No nausea/vomiting, diarrhea, constipation  :  No change in urine output; no dysuria  MS: LE pain   Skin: No rashes  Neuro: No headache; no abnormal movements  Remainder negative, except as per the HPI

## 2020-11-10 NOTE — ED PROVIDER NOTE - NSFOLLOWUPINSTRUCTIONS_ED_ALL_ED_FT
Please hold today's dose of coumadin, please follow up with your cardiologist regarding your elevated INR.     Please take prednisone as prescribed. Please take tylenol 650mg every 6 hours. Return to emergency department if fevers, chills, other concerning symptoms. Please hold today's dose of coumadin, please follow up with your cardiologist regarding your elevated INR.     Please take prednisone as prescribed. Please take tylenol 650mg every 6 hours. Return to emergency department if fevers, chills, other concerning symptoms.    VNS to come tomorrow for evaluation & to set up outpatient services.    You should NOT be alone next 2 - 3 days.    Keep knee bandage on, in place, clean & dry.

## 2020-11-10 NOTE — ED PROVIDER NOTE - PATIENT PORTAL LINK FT
You can access the FollowMyHealth Patient Portal offered by [facility] by registering at the following website: [email or web address]. By joining [employer]’s FollowMyHealth portal, you will also be able to view your health information using other applications (apps) compatible with our system.

## 2020-11-10 NOTE — ED PROVIDER NOTE - CLINICAL SUMMARY MEDICAL DECISION MAKING FREE TEXT BOX
pt with RLE pain, no traumatic injury, will xray r/o fx, pain control, possible admission as pt unable to care for self vs case management for HHA. no back pain, unlikely cord lesion.

## 2021-01-25 ENCOUNTER — APPOINTMENT (OUTPATIENT)
Dept: ELECTROPHYSIOLOGY | Facility: CLINIC | Age: 86
End: 2021-01-25
Payer: MEDICARE

## 2021-01-25 VITALS
OXYGEN SATURATION: 97 % | HEIGHT: 63 IN | BODY MASS INDEX: 24.8 KG/M2 | HEART RATE: 74 BPM | RESPIRATION RATE: 16 BRPM | DIASTOLIC BLOOD PRESSURE: 65 MMHG | WEIGHT: 140 LBS | SYSTOLIC BLOOD PRESSURE: 148 MMHG

## 2021-01-25 PROCEDURE — 93280 PM DEVICE PROGR EVAL DUAL: CPT

## 2021-04-30 ENCOUNTER — APPOINTMENT (OUTPATIENT)
Dept: ELECTROPHYSIOLOGY | Facility: CLINIC | Age: 86
End: 2021-04-30
Payer: MEDICARE

## 2021-04-30 ENCOUNTER — NON-APPOINTMENT (OUTPATIENT)
Age: 86
End: 2021-04-30

## 2021-04-30 VITALS
HEIGHT: 63 IN | OXYGEN SATURATION: 95 % | HEART RATE: 65 BPM | DIASTOLIC BLOOD PRESSURE: 64 MMHG | RESPIRATION RATE: 16 BRPM | SYSTOLIC BLOOD PRESSURE: 130 MMHG

## 2021-04-30 PROCEDURE — 93279 PRGRMG DEV EVAL PM/LDLS PM: CPT

## 2021-05-11 NOTE — DISCHARGE NOTE ADULT - CARE PROVIDERS DIRECT ADDRESSES
Attempted to reach patient  No answer  No voicemail  Will try again tomorrow  
Patient states he was returning a call to the clinic in regards to changing pharmacies. Patient was calling back to verify that yes, he is using mail order pharmacy now ([facility] Patient believes the pharmacy may have faxed us something already.   
See RX encounter  Meds sent    
,[email or web address]

## 2021-08-06 ENCOUNTER — APPOINTMENT (OUTPATIENT)
Dept: ELECTROPHYSIOLOGY | Facility: CLINIC | Age: 86
End: 2021-08-06
Payer: MEDICARE

## 2021-08-06 VITALS
HEART RATE: 71 BPM | SYSTOLIC BLOOD PRESSURE: 148 MMHG | HEIGHT: 63 IN | RESPIRATION RATE: 16 BRPM | BODY MASS INDEX: 25.69 KG/M2 | WEIGHT: 145 LBS | OXYGEN SATURATION: 97 % | DIASTOLIC BLOOD PRESSURE: 59 MMHG

## 2021-08-06 PROCEDURE — 93279 PRGRMG DEV EVAL PM/LDLS PM: CPT

## 2021-08-06 RX ORDER — METHOTREXATE 2.5 MG/1
2.5 TABLET ORAL
Refills: 0 | Status: ACTIVE | COMMUNITY
Start: 2021-08-06

## 2021-08-06 RX ORDER — POTASSIUM CHLORIDE 750 MG/1
10 TABLET, EXTENDED RELEASE ORAL DAILY
Refills: 0 | Status: ACTIVE | COMMUNITY
Start: 2021-08-06

## 2021-08-06 RX ORDER — LOSARTAN POTASSIUM 25 MG/1
25 TABLET, FILM COATED ORAL
Qty: 90 | Refills: 0 | Status: ACTIVE | COMMUNITY
Start: 2021-08-06

## 2021-08-06 RX ORDER — PRAVASTATIN SODIUM 20 MG/1
20 TABLET ORAL
Qty: 90 | Refills: 0 | Status: ACTIVE | COMMUNITY
Start: 2021-08-06

## 2021-08-06 RX ORDER — ALLOPURINOL 100 MG/1
100 TABLET ORAL
Qty: 180 | Refills: 1 | Status: ACTIVE | COMMUNITY
Start: 2021-08-06

## 2021-08-06 RX ORDER — MONTELUKAST 10 MG/1
10 TABLET, FILM COATED ORAL DAILY
Refills: 0 | Status: ACTIVE | COMMUNITY
Start: 2021-08-06

## 2021-08-06 RX ORDER — FOLIC ACID 1 MG/1
1 TABLET ORAL
Qty: 90 | Refills: 0 | Status: ACTIVE | COMMUNITY
Start: 2021-08-06

## 2021-11-05 ENCOUNTER — APPOINTMENT (OUTPATIENT)
Dept: ELECTROPHYSIOLOGY | Facility: CLINIC | Age: 86
End: 2021-11-05
Payer: MEDICARE

## 2021-11-05 VITALS
WEIGHT: 169.31 LBS | DIASTOLIC BLOOD PRESSURE: 75 MMHG | HEART RATE: 72 BPM | BODY MASS INDEX: 29.99 KG/M2 | OXYGEN SATURATION: 100 % | RESPIRATION RATE: 17 BRPM | SYSTOLIC BLOOD PRESSURE: 147 MMHG

## 2021-11-05 PROCEDURE — 93279 PRGRMG DEV EVAL PM/LDLS PM: CPT

## 2021-11-22 ENCOUNTER — INPATIENT (INPATIENT)
Facility: HOSPITAL | Age: 86
LOS: 2 days | Discharge: NURSING, REHAB OR LONG-TERM CARE | DRG: 522 | End: 2021-11-25
Attending: INTERNAL MEDICINE | Admitting: HOSPITALIST
Payer: MEDICARE

## 2021-11-22 VITALS
WEIGHT: 149.91 LBS | SYSTOLIC BLOOD PRESSURE: 168 MMHG | TEMPERATURE: 98 F | HEIGHT: 63 IN | DIASTOLIC BLOOD PRESSURE: 74 MMHG

## 2021-11-22 DIAGNOSIS — Z95.0 PRESENCE OF CARDIAC PACEMAKER: Chronic | ICD-10-CM

## 2021-11-22 DIAGNOSIS — S72.002A FRACTURE OF UNSPECIFIED PART OF NECK OF LEFT FEMUR, INITIAL ENCOUNTER FOR CLOSED FRACTURE: ICD-10-CM

## 2021-11-22 LAB
ADD ON TEST-SPECIMEN IN LAB: SIGNIFICANT CHANGE UP
ALBUMIN SERPL ELPH-MCNC: 3.8 G/DL — SIGNIFICANT CHANGE UP (ref 3.3–5)
ALP SERPL-CCNC: 108 U/L — SIGNIFICANT CHANGE UP (ref 40–120)
ALT FLD-CCNC: 81 U/L — HIGH (ref 12–78)
ANION GAP SERPL CALC-SCNC: 6 MMOL/L — SIGNIFICANT CHANGE UP (ref 5–17)
APTT BLD: 55.8 SEC — HIGH (ref 27.5–35.5)
AST SERPL-CCNC: 65 U/L — HIGH (ref 15–37)
BASOPHILS # BLD AUTO: 0.11 K/UL — SIGNIFICANT CHANGE UP (ref 0–0.2)
BASOPHILS NFR BLD AUTO: 0.6 % — SIGNIFICANT CHANGE UP (ref 0–2)
BILIRUB SERPL-MCNC: 0.7 MG/DL — SIGNIFICANT CHANGE UP (ref 0.2–1.2)
BUN SERPL-MCNC: 26 MG/DL — HIGH (ref 7–23)
CALCIUM SERPL-MCNC: 9.2 MG/DL — SIGNIFICANT CHANGE UP (ref 8.5–10.1)
CHLORIDE SERPL-SCNC: 103 MMOL/L — SIGNIFICANT CHANGE UP (ref 96–108)
CO2 SERPL-SCNC: 27 MMOL/L — SIGNIFICANT CHANGE UP (ref 22–31)
CREAT SERPL-MCNC: 1 MG/DL — SIGNIFICANT CHANGE UP (ref 0.5–1.3)
EOSINOPHIL # BLD AUTO: 0.35 K/UL — SIGNIFICANT CHANGE UP (ref 0–0.5)
EOSINOPHIL NFR BLD AUTO: 2 % — SIGNIFICANT CHANGE UP (ref 0–6)
GLUCOSE SERPL-MCNC: 129 MG/DL — HIGH (ref 70–99)
HCT VFR BLD CALC: 46.9 % — HIGH (ref 34.5–45)
HGB BLD-MCNC: 14.4 G/DL — SIGNIFICANT CHANGE UP (ref 11.5–15.5)
IMM GRANULOCYTES NFR BLD AUTO: 1 % — SIGNIFICANT CHANGE UP (ref 0–1.5)
INR BLD: 3.18 RATIO — HIGH (ref 0.88–1.16)
LACTATE SERPL-SCNC: 2.1 MMOL/L — HIGH (ref 0.7–2)
LIDOCAIN IGE QN: 101 U/L — SIGNIFICANT CHANGE UP (ref 73–393)
LYMPHOCYTES # BLD AUTO: 0.77 K/UL — LOW (ref 1–3.3)
LYMPHOCYTES # BLD AUTO: 4.5 % — LOW (ref 13–44)
MCHC RBC-ENTMCNC: 30.2 PG — SIGNIFICANT CHANGE UP (ref 27–34)
MCHC RBC-ENTMCNC: 30.7 GM/DL — LOW (ref 32–36)
MCV RBC AUTO: 98.3 FL — SIGNIFICANT CHANGE UP (ref 80–100)
MONOCYTES # BLD AUTO: 0.4 K/UL — SIGNIFICANT CHANGE UP (ref 0–0.9)
MONOCYTES NFR BLD AUTO: 2.3 % — SIGNIFICANT CHANGE UP (ref 2–14)
NEUTROPHILS # BLD AUTO: 15.32 K/UL — HIGH (ref 1.8–7.4)
NEUTROPHILS NFR BLD AUTO: 89.6 % — HIGH (ref 43–77)
PLATELET # BLD AUTO: 483 K/UL — HIGH (ref 150–400)
POTASSIUM SERPL-MCNC: 4.4 MMOL/L — SIGNIFICANT CHANGE UP (ref 3.5–5.3)
POTASSIUM SERPL-SCNC: 4.4 MMOL/L — SIGNIFICANT CHANGE UP (ref 3.5–5.3)
PROT SERPL-MCNC: 7.1 GM/DL — SIGNIFICANT CHANGE UP (ref 6–8.3)
PROTHROM AB SERPL-ACNC: 35.2 SEC — HIGH (ref 10.6–13.6)
RBC # BLD: 4.77 M/UL — SIGNIFICANT CHANGE UP (ref 3.8–5.2)
RBC # FLD: 21.5 % — HIGH (ref 10.3–14.5)
SODIUM SERPL-SCNC: 136 MMOL/L — SIGNIFICANT CHANGE UP (ref 135–145)
WBC # BLD: 17.12 K/UL — HIGH (ref 3.8–10.5)
WBC # FLD AUTO: 17.12 K/UL — HIGH (ref 3.8–10.5)

## 2021-11-22 PROCEDURE — 80053 COMPREHEN METABOLIC PANEL: CPT

## 2021-11-22 PROCEDURE — 80048 BASIC METABOLIC PNL TOTAL CA: CPT

## 2021-11-22 PROCEDURE — 85025 COMPLETE CBC W/AUTO DIFF WBC: CPT

## 2021-11-22 PROCEDURE — 85027 COMPLETE CBC AUTOMATED: CPT

## 2021-11-22 PROCEDURE — 83605 ASSAY OF LACTIC ACID: CPT

## 2021-11-22 PROCEDURE — 97116 GAIT TRAINING THERAPY: CPT | Mod: GP

## 2021-11-22 PROCEDURE — 85610 PROTHROMBIN TIME: CPT

## 2021-11-22 PROCEDURE — 99285 EMERGENCY DEPT VISIT HI MDM: CPT | Mod: GC

## 2021-11-22 PROCEDURE — 88305 TISSUE EXAM BY PATHOLOGIST: CPT

## 2021-11-22 PROCEDURE — 70450 CT HEAD/BRAIN W/O DYE: CPT | Mod: 26,MA

## 2021-11-22 PROCEDURE — 80076 HEPATIC FUNCTION PANEL: CPT

## 2021-11-22 PROCEDURE — 73502 X-RAY EXAM HIP UNI 2-3 VIEWS: CPT | Mod: 26,LT

## 2021-11-22 PROCEDURE — 86769 SARS-COV-2 COVID-19 ANTIBODY: CPT

## 2021-11-22 PROCEDURE — 99223 1ST HOSP IP/OBS HIGH 75: CPT

## 2021-11-22 PROCEDURE — 82962 GLUCOSE BLOOD TEST: CPT

## 2021-11-22 PROCEDURE — 82306 VITAMIN D 25 HYDROXY: CPT

## 2021-11-22 PROCEDURE — 72125 CT NECK SPINE W/O DYE: CPT | Mod: 26,MA

## 2021-11-22 PROCEDURE — 71250 CT THORAX DX C-: CPT

## 2021-11-22 PROCEDURE — 71045 X-RAY EXAM CHEST 1 VIEW: CPT | Mod: 26

## 2021-11-22 PROCEDURE — 80061 LIPID PANEL: CPT

## 2021-11-22 PROCEDURE — 73502 X-RAY EXAM HIP UNI 2-3 VIEWS: CPT | Mod: RT

## 2021-11-22 PROCEDURE — 83036 HEMOGLOBIN GLYCOSYLATED A1C: CPT

## 2021-11-22 PROCEDURE — 93010 ELECTROCARDIOGRAM REPORT: CPT

## 2021-11-22 PROCEDURE — 97530 THERAPEUTIC ACTIVITIES: CPT | Mod: GP

## 2021-11-22 PROCEDURE — 73610 X-RAY EXAM OF ANKLE: CPT | Mod: RT

## 2021-11-22 PROCEDURE — 36415 COLL VENOUS BLD VENIPUNCTURE: CPT

## 2021-11-22 PROCEDURE — 73620 X-RAY EXAM OF FOOT: CPT | Mod: RT

## 2021-11-22 PROCEDURE — C1776: CPT

## 2021-11-22 PROCEDURE — 81001 URINALYSIS AUTO W/SCOPE: CPT

## 2021-11-22 PROCEDURE — 86923 COMPATIBILITY TEST ELECTRIC: CPT

## 2021-11-22 PROCEDURE — 97110 THERAPEUTIC EXERCISES: CPT | Mod: GP

## 2021-11-22 PROCEDURE — 93005 ELECTROCARDIOGRAM TRACING: CPT

## 2021-11-22 PROCEDURE — 88311 DECALCIFY TISSUE: CPT

## 2021-11-22 PROCEDURE — 85730 THROMBOPLASTIN TIME PARTIAL: CPT

## 2021-11-22 PROCEDURE — 73552 X-RAY EXAM OF FEMUR 2/>: CPT | Mod: 26,LT

## 2021-11-22 PROCEDURE — 82040 ASSAY OF SERUM ALBUMIN: CPT

## 2021-11-22 PROCEDURE — 97162 PT EVAL MOD COMPLEX 30 MIN: CPT | Mod: GP

## 2021-11-22 PROCEDURE — 93970 EXTREMITY STUDY: CPT

## 2021-11-22 RX ORDER — FENTANYL CITRATE 50 UG/ML
25 INJECTION INTRAVENOUS ONCE
Refills: 0 | Status: DISCONTINUED | OUTPATIENT
Start: 2021-11-22 | End: 2021-11-22

## 2021-11-22 RX ORDER — SODIUM CHLORIDE 9 MG/ML
250 INJECTION INTRAMUSCULAR; INTRAVENOUS; SUBCUTANEOUS ONCE
Refills: 0 | Status: COMPLETED | OUTPATIENT
Start: 2021-11-22 | End: 2021-11-22

## 2021-11-22 RX ORDER — MORPHINE SULFATE 50 MG/1
4 CAPSULE, EXTENDED RELEASE ORAL ONCE
Refills: 0 | Status: DISCONTINUED | OUTPATIENT
Start: 2021-11-22 | End: 2021-11-22

## 2021-11-22 RX ADMIN — FENTANYL CITRATE 25 MICROGRAM(S): 50 INJECTION INTRAVENOUS at 22:01

## 2021-11-22 RX ADMIN — SODIUM CHLORIDE 250 MILLILITER(S): 9 INJECTION INTRAMUSCULAR; INTRAVENOUS; SUBCUTANEOUS at 22:01

## 2021-11-22 NOTE — ED PROVIDER NOTE - NS ED ROS FT
Constitution: No Fever or chills, No Weight Loss,   Eyes: No visual changes  HEENT: No cough, No Discharge, No Rhinorrhea, No URI symptoms  Cardio: No Chest pain, No Palpitations, No Dyspnea  Resp: No SOB, No Wheezing  GI: No abdominal pain, No Nausea, No Vomiting, No Constipation, No Diarrhea  : No burning upon urination, trouble urinating, no foul odor from urine  MSK: (+) L Hip Pain; No Back pain, No Numbness, No Tingling, No Weakness  Neuro: No Headache, No changes to Vision, No changes to Hearing  Skin: No rashes, No Bruising, No Swelling

## 2021-11-22 NOTE — ED PROVIDER NOTE - NSICDXFAMILYHX_GEN_ALL_CORE_FT
FAMILY HISTORY:  Father  Still living? Unknown  CAD (coronary artery disease), Age at diagnosis: Age Unknown    Mother  Still living? No  CAD (coronary artery disease), Age at diagnosis: Age Unknown    Sibling  Still living? Unknown  Family history of breast cancer in sister, Age at diagnosis: Age Unknown

## 2021-11-22 NOTE — ED PROVIDER NOTE - PROGRESS NOTE DETAILS
Resident Triston: X-Ray with fem neck fx. Ortho consulted. pt with hypoxemia, will add on BNP and trop given CXR.  pt admitted to medicine.  trop and BNP signed out to Dr Demarco overnight.  pt still pending ortho consult

## 2021-11-22 NOTE — ED PROVIDER NOTE - CARE PLAN
1 Principal Discharge DX:	Hip fracture, left  Secondary Diagnosis:	Fall at home  Secondary Diagnosis:	Hypoxemia

## 2021-11-22 NOTE — ED PROVIDER NOTE - NSICDXPASTMEDICALHX_GEN_ALL_CORE_FT
PAST MEDICAL HISTORY:  Afib     Carotid artery disease     Chronic CHF     HTN (hypertension)     Hyperlipidemia     Pacemaker     Sleep apnea     SSS (sick sinus syndrome)

## 2021-11-22 NOTE — ED PROVIDER NOTE - ATTENDING CONTRIBUTION TO CARE
I, Sabrina Franks MD,  performed the initial face to face bedside interview with this patient regarding history of present illness, review of symptoms and relevant past medical, social and family history.  I completed an independent physical examination.  I was the initial provider who evaluated this patient. I have signed out the follow up of any pending tests (i.e. labs, radiological studies) to the resident.  I have communicated the patient’s plan of care and disposition with the resident.

## 2021-11-22 NOTE — ED PROVIDER NOTE - OBJECTIVE STATEMENT
88 y/o female with a history of Afib (on Coumadin), CAD s/p pacemaker, CHF, HLD, HTN presents s/p mechanical fall. Pt is walker-dependent and was climbing 1 set of stairs when she lost her lost balance, fell back, and hit her head. Denies any LOC. Now with isolated left lower extremity and left hip pain. Denies any preceding fever, chills, cough, CP, difficulty breathing, dizziness, palpitations, abd pain, N/V/D, or dysuric symptoms prior to fall. Denies any current headache, blurry vision, neck pain, or numbness/tingling/weakness in peripheral extremities. 86 y/o female with a history of Afib (on Coumadin), CAD s/p pacemaker, CHF, HLD, HTN presents s/p mechanical fall. Pt is walker-dependent and was climbing 1 stair when she lost her lost balance, fell back, and hit her head. Denies any LOC. Now with isolated left lower extremity and left hip pain. Denies any preceding fever, chills, cough, CP, difficulty breathing, dizziness, palpitations, abd pain, N/V/D, or dysuric symptoms prior to fall. Denies any current headache, blurry vision, neck pain, or numbness/tingling/weakness in peripheral extremities. Denies any current CP, SOB, abdominal pain, nausea, vomiting, diarrhea, constipation, bloody stools, dysuric symptoms.

## 2021-11-22 NOTE — ED PROVIDER NOTE - PHYSICAL EXAMINATION
GEN - NAD; non-toxic; A+Ox3, speaking full sentences  HENT - NC/AT, No visible Ecchymosis, No Abrasions, No Lac/Tears, MMM, no discharge  EYES - EOMI, PERRL, no conjunctival pallor, no scleral icterus  NECK - Neck supple, No LAD, No Swelling  PULM - CTA B/L,  symmetric breath sounds  CV -  S1 S2, no murmurs 2+ Pulses B/L UE  GI - (-) Drake's, (-) Rovsings, (-) McBurneys; NT/ND, soft, no guarding, no rebound, no masses    MSK/EXT- no CVA tenderness; (+) L Hip Ext Rotated and Shortened on exam; Soft Compartment B/L; 2+ Pulses DP B/L; warm and well perfused, no calf tenderness/swelling/erythema   SPINE - CTL Spine without midline tenderness, stepoffs, deform  SKIN - no rash or bruising  NEUROLOGIC - alert, CN2-12 intact, sensation intact, moving all 4 ext with 5/5 Strength

## 2021-11-22 NOTE — ED PROVIDER NOTE - CLINICAL SUMMARY MEDICAL DECISION MAKING FREE TEXT BOX
88 y/o female with a history of Afib (on Coumadin), CAD s/p pacemaker, CHF, HLD, HTN presents s/p mechanical fall now with shortened externally rotated left hip. Exam, presentation, and history concerning for left hip fracture. Will rule out ICH and C-spine fracture. Evaluation is not consistent with neurovascular compromise or compartment syndrome. Plan: CT head & C-spine, CBC, CMP, EKG, CXR, UA, urine culture, type and screen, coags, and XR hip, pelvis & femur. VSS, non-toxic, maintaining airway. GCS: 15 86 y/o female with a history of Afib (on Coumadin), CAD s/p pacemaker, CHF, HLD, HTN presents s/p mechanical fall now with shortened externally rotated left hip. Exam, presentation, and history concerning for left hip fracture. Will rule out ICH and C-spine fracture. Evaluation is not consistent with neurovascular compromise or compartment syndrome. Plan: CT head & C-spine, CBC, CMP, EKG, CXR, UA, urine culture, type and screen, coags, and XR hip, pelvis & femur. VSS, non-toxic, maintaining airway. GCS: 15, ABC's Intact.

## 2021-11-23 ENCOUNTER — TRANSCRIPTION ENCOUNTER (OUTPATIENT)
Age: 86
End: 2021-11-23

## 2021-11-23 ENCOUNTER — RESULT REVIEW (OUTPATIENT)
Age: 86
End: 2021-11-23

## 2021-11-23 LAB
A1C WITH ESTIMATED AVERAGE GLUCOSE RESULT: 5.9 % — HIGH (ref 4–5.6)
ANION GAP SERPL CALC-SCNC: 5 MMOL/L — SIGNIFICANT CHANGE UP (ref 5–17)
ANION GAP SERPL CALC-SCNC: 6 MMOL/L — SIGNIFICANT CHANGE UP (ref 5–17)
APPEARANCE UR: CLEAR — SIGNIFICANT CHANGE UP
APTT BLD: 36.8 SEC — HIGH (ref 27.5–35.5)
APTT BLD: 48.6 SEC — HIGH (ref 27.5–35.5)
BILIRUB UR-MCNC: NEGATIVE — SIGNIFICANT CHANGE UP
BUN SERPL-MCNC: 24 MG/DL — HIGH (ref 7–23)
BUN SERPL-MCNC: 27 MG/DL — HIGH (ref 7–23)
CALCIUM SERPL-MCNC: 8.8 MG/DL — SIGNIFICANT CHANGE UP (ref 8.5–10.1)
CALCIUM SERPL-MCNC: 9.3 MG/DL — SIGNIFICANT CHANGE UP (ref 8.5–10.1)
CHLORIDE SERPL-SCNC: 104 MMOL/L — SIGNIFICANT CHANGE UP (ref 96–108)
CHLORIDE SERPL-SCNC: 107 MMOL/L — SIGNIFICANT CHANGE UP (ref 96–108)
CHOLEST SERPL-MCNC: 127 MG/DL — SIGNIFICANT CHANGE UP
CO2 SERPL-SCNC: 26 MMOL/L — SIGNIFICANT CHANGE UP (ref 22–31)
CO2 SERPL-SCNC: 29 MMOL/L — SIGNIFICANT CHANGE UP (ref 22–31)
COLOR SPEC: YELLOW — SIGNIFICANT CHANGE UP
COVID-19 NUCLEOCAPSID GAM AB INTERP: NEGATIVE — SIGNIFICANT CHANGE UP
COVID-19 NUCLEOCAPSID TOTAL GAM ANTIBODY RESULT: 0.07 INDEX — SIGNIFICANT CHANGE UP
COVID-19 SPIKE DOMAIN AB INTERP: POSITIVE
COVID-19 SPIKE DOMAIN ANTIBODY RESULT: >250 U/ML — HIGH
CREAT SERPL-MCNC: 0.92 MG/DL — SIGNIFICANT CHANGE UP (ref 0.5–1.3)
CREAT SERPL-MCNC: 1.04 MG/DL — SIGNIFICANT CHANGE UP (ref 0.5–1.3)
DIFF PNL FLD: NEGATIVE — SIGNIFICANT CHANGE UP
ESTIMATED AVERAGE GLUCOSE: 123 MG/DL — HIGH (ref 68–114)
GLUCOSE SERPL-MCNC: 131 MG/DL — HIGH (ref 70–99)
GLUCOSE SERPL-MCNC: 144 MG/DL — HIGH (ref 70–99)
GLUCOSE UR QL: NEGATIVE MG/DL — SIGNIFICANT CHANGE UP
HCT VFR BLD CALC: 40 % — SIGNIFICANT CHANGE UP (ref 34.5–45)
HCT VFR BLD CALC: 45.1 % — HIGH (ref 34.5–45)
HDLC SERPL-MCNC: 58 MG/DL — SIGNIFICANT CHANGE UP
HGB BLD-MCNC: 12.1 G/DL — SIGNIFICANT CHANGE UP (ref 11.5–15.5)
HGB BLD-MCNC: 13.8 G/DL — SIGNIFICANT CHANGE UP (ref 11.5–15.5)
INR BLD: 1.77 RATIO — HIGH (ref 0.88–1.16)
INR BLD: 3.2 RATIO — HIGH (ref 0.88–1.16)
KETONES UR-MCNC: NEGATIVE — SIGNIFICANT CHANGE UP
LEUKOCYTE ESTERASE UR-ACNC: ABNORMAL
LIPID PNL WITH DIRECT LDL SERPL: 58 MG/DL — SIGNIFICANT CHANGE UP
MCHC RBC-ENTMCNC: 30.1 PG — SIGNIFICANT CHANGE UP (ref 27–34)
MCHC RBC-ENTMCNC: 30.2 PG — SIGNIFICANT CHANGE UP (ref 27–34)
MCHC RBC-ENTMCNC: 30.3 GM/DL — LOW (ref 32–36)
MCHC RBC-ENTMCNC: 30.6 GM/DL — LOW (ref 32–36)
MCV RBC AUTO: 98.3 FL — SIGNIFICANT CHANGE UP (ref 80–100)
MCV RBC AUTO: 99.8 FL — SIGNIFICANT CHANGE UP (ref 80–100)
NITRITE UR-MCNC: NEGATIVE — SIGNIFICANT CHANGE UP
NON HDL CHOLESTEROL: 69 MG/DL — SIGNIFICANT CHANGE UP
PH UR: 5 — SIGNIFICANT CHANGE UP (ref 5–8)
PLATELET # BLD AUTO: 413 K/UL — HIGH (ref 150–400)
PLATELET # BLD AUTO: 490 K/UL — HIGH (ref 150–400)
POTASSIUM SERPL-MCNC: 4.3 MMOL/L — SIGNIFICANT CHANGE UP (ref 3.5–5.3)
POTASSIUM SERPL-MCNC: 4.3 MMOL/L — SIGNIFICANT CHANGE UP (ref 3.5–5.3)
POTASSIUM SERPL-SCNC: 4.3 MMOL/L — SIGNIFICANT CHANGE UP (ref 3.5–5.3)
POTASSIUM SERPL-SCNC: 4.3 MMOL/L — SIGNIFICANT CHANGE UP (ref 3.5–5.3)
PROT UR-MCNC: 15 MG/DL
PROTHROM AB SERPL-ACNC: 20 SEC — HIGH (ref 10.6–13.6)
PROTHROM AB SERPL-ACNC: 35.1 SEC — HIGH (ref 10.6–13.6)
RBC # BLD: 4.01 M/UL — SIGNIFICANT CHANGE UP (ref 3.8–5.2)
RBC # BLD: 4.59 M/UL — SIGNIFICANT CHANGE UP (ref 3.8–5.2)
RBC # FLD: 21.3 % — HIGH (ref 10.3–14.5)
RBC # FLD: 21.4 % — HIGH (ref 10.3–14.5)
SARS-COV-2 IGG+IGM SERPL QL IA: 0.07 INDEX — SIGNIFICANT CHANGE UP
SARS-COV-2 IGG+IGM SERPL QL IA: >250 U/ML — HIGH
SARS-COV-2 IGG+IGM SERPL QL IA: NEGATIVE — SIGNIFICANT CHANGE UP
SARS-COV-2 IGG+IGM SERPL QL IA: POSITIVE
SARS-COV-2 RNA SPEC QL NAA+PROBE: SIGNIFICANT CHANGE UP
SODIUM SERPL-SCNC: 138 MMOL/L — SIGNIFICANT CHANGE UP (ref 135–145)
SODIUM SERPL-SCNC: 139 MMOL/L — SIGNIFICANT CHANGE UP (ref 135–145)
SP GR SPEC: 1.02 — SIGNIFICANT CHANGE UP (ref 1.01–1.02)
TRIGL SERPL-MCNC: 56 MG/DL — SIGNIFICANT CHANGE UP
UROBILINOGEN FLD QL: NEGATIVE MG/DL — SIGNIFICANT CHANGE UP
WBC # BLD: 17.99 K/UL — HIGH (ref 3.8–10.5)
WBC # BLD: 19.19 K/UL — HIGH (ref 3.8–10.5)
WBC # FLD AUTO: 17.99 K/UL — HIGH (ref 3.8–10.5)
WBC # FLD AUTO: 19.19 K/UL — HIGH (ref 3.8–10.5)

## 2021-11-23 PROCEDURE — 73610 X-RAY EXAM OF ANKLE: CPT | Mod: 26,RT

## 2021-11-23 PROCEDURE — 99232 SBSQ HOSP IP/OBS MODERATE 35: CPT

## 2021-11-23 PROCEDURE — 73502 X-RAY EXAM HIP UNI 2-3 VIEWS: CPT | Mod: 26,LT

## 2021-11-23 PROCEDURE — 27125 PARTIAL HIP REPLACEMENT: CPT | Mod: AS

## 2021-11-23 PROCEDURE — 88305 TISSUE EXAM BY PATHOLOGIST: CPT | Mod: 26

## 2021-11-23 PROCEDURE — 93970 EXTREMITY STUDY: CPT | Mod: 26

## 2021-11-23 PROCEDURE — 73620 X-RAY EXAM OF FOOT: CPT | Mod: 26,RT

## 2021-11-23 PROCEDURE — 99223 1ST HOSP IP/OBS HIGH 75: CPT

## 2021-11-23 PROCEDURE — 88311 DECALCIFY TISSUE: CPT | Mod: 26

## 2021-11-23 PROCEDURE — 71250 CT THORAX DX C-: CPT | Mod: 26

## 2021-11-23 PROCEDURE — 93010 ELECTROCARDIOGRAM REPORT: CPT

## 2021-11-23 RX ORDER — ACETAMINOPHEN 500 MG
650 TABLET ORAL EVERY 6 HOURS
Refills: 0 | Status: DISCONTINUED | OUTPATIENT
Start: 2021-11-23 | End: 2021-11-23

## 2021-11-23 RX ORDER — SPIRONOLACTONE 25 MG/1
1 TABLET, FILM COATED ORAL
Qty: 0 | Refills: 0 | DISCHARGE

## 2021-11-23 RX ORDER — OXYCODONE HYDROCHLORIDE 5 MG/1
10 TABLET ORAL EVERY 4 HOURS
Refills: 0 | Status: DISCONTINUED | OUTPATIENT
Start: 2021-11-23 | End: 2021-11-25

## 2021-11-23 RX ORDER — ATORVASTATIN CALCIUM 80 MG/1
20 TABLET, FILM COATED ORAL AT BEDTIME
Refills: 0 | Status: DISCONTINUED | OUTPATIENT
Start: 2021-11-23 | End: 2021-11-23

## 2021-11-23 RX ORDER — FERROUS SULFATE 325(65) MG
325 TABLET ORAL DAILY
Refills: 0 | Status: DISCONTINUED | OUTPATIENT
Start: 2021-11-23 | End: 2021-11-25

## 2021-11-23 RX ORDER — METOPROLOL TARTRATE 50 MG
50 TABLET ORAL DAILY
Refills: 0 | Status: DISCONTINUED | OUTPATIENT
Start: 2021-11-23 | End: 2021-11-25

## 2021-11-23 RX ORDER — CEFAZOLIN SODIUM 1 G
2000 VIAL (EA) INJECTION EVERY 8 HOURS
Refills: 0 | Status: COMPLETED | OUTPATIENT
Start: 2021-11-23 | End: 2021-11-24

## 2021-11-23 RX ORDER — PHYTONADIONE (VIT K1) 5 MG
10 TABLET ORAL ONCE
Refills: 0 | Status: COMPLETED | OUTPATIENT
Start: 2021-11-23 | End: 2021-11-23

## 2021-11-23 RX ORDER — ATORVASTATIN CALCIUM 80 MG/1
20 TABLET, FILM COATED ORAL AT BEDTIME
Refills: 0 | Status: DISCONTINUED | OUTPATIENT
Start: 2021-11-23 | End: 2021-11-25

## 2021-11-23 RX ORDER — MONTELUKAST 4 MG/1
10 TABLET, CHEWABLE ORAL DAILY
Refills: 0 | Status: DISCONTINUED | OUTPATIENT
Start: 2021-11-23 | End: 2021-11-23

## 2021-11-23 RX ORDER — OXYCODONE HYDROCHLORIDE 5 MG/1
5 TABLET ORAL EVERY 4 HOURS
Refills: 0 | Status: DISCONTINUED | OUTPATIENT
Start: 2021-11-23 | End: 2021-11-25

## 2021-11-23 RX ORDER — SODIUM CHLORIDE 9 MG/ML
1000 INJECTION, SOLUTION INTRAVENOUS
Refills: 0 | Status: DISCONTINUED | OUTPATIENT
Start: 2021-11-23 | End: 2021-11-23

## 2021-11-23 RX ORDER — MORPHINE SULFATE 50 MG/1
2 CAPSULE, EXTENDED RELEASE ORAL EVERY 4 HOURS
Refills: 0 | Status: DISCONTINUED | OUTPATIENT
Start: 2021-11-23 | End: 2021-11-23

## 2021-11-23 RX ORDER — SODIUM CHLORIDE 9 MG/ML
1000 INJECTION, SOLUTION INTRAVENOUS
Refills: 0 | Status: DISCONTINUED | OUTPATIENT
Start: 2021-11-23 | End: 2021-11-25

## 2021-11-23 RX ORDER — HYDROMORPHONE HYDROCHLORIDE 2 MG/ML
0.5 INJECTION INTRAMUSCULAR; INTRAVENOUS; SUBCUTANEOUS EVERY 4 HOURS
Refills: 0 | Status: DISCONTINUED | OUTPATIENT
Start: 2021-11-23 | End: 2021-11-25

## 2021-11-23 RX ORDER — LANOLIN ALCOHOL/MO/W.PET/CERES
3 CREAM (GRAM) TOPICAL AT BEDTIME
Refills: 0 | Status: DISCONTINUED | OUTPATIENT
Start: 2021-11-23 | End: 2021-11-23

## 2021-11-23 RX ORDER — DEXTROSE 50 % IN WATER 50 %
25 SYRINGE (ML) INTRAVENOUS ONCE
Refills: 0 | Status: DISCONTINUED | OUTPATIENT
Start: 2021-11-23 | End: 2021-11-23

## 2021-11-23 RX ORDER — FENTANYL CITRATE 50 UG/ML
25 INJECTION INTRAVENOUS
Refills: 0 | Status: CANCELLED | OUTPATIENT
Start: 2021-11-24 | End: 2021-11-23

## 2021-11-23 RX ORDER — LATANOPROST 0.05 MG/ML
1 SOLUTION/ DROPS OPHTHALMIC; TOPICAL AT BEDTIME
Refills: 0 | Status: DISCONTINUED | OUTPATIENT
Start: 2021-11-23 | End: 2021-11-25

## 2021-11-23 RX ORDER — LATANOPROST 0.05 MG/ML
1 SOLUTION/ DROPS OPHTHALMIC; TOPICAL AT BEDTIME
Refills: 0 | Status: DISCONTINUED | OUTPATIENT
Start: 2021-11-23 | End: 2021-11-23

## 2021-11-23 RX ORDER — ONDANSETRON 8 MG/1
8 TABLET, FILM COATED ORAL EVERY 8 HOURS
Refills: 0 | Status: DISCONTINUED | OUTPATIENT
Start: 2021-11-23 | End: 2021-11-25

## 2021-11-23 RX ORDER — GLUCAGON INJECTION, SOLUTION 0.5 MG/.1ML
1 INJECTION, SOLUTION SUBCUTANEOUS ONCE
Refills: 0 | Status: DISCONTINUED | OUTPATIENT
Start: 2021-11-23 | End: 2021-11-25

## 2021-11-23 RX ORDER — FUROSEMIDE 40 MG
1 TABLET ORAL
Qty: 0 | Refills: 0 | DISCHARGE

## 2021-11-23 RX ORDER — LOSARTAN POTASSIUM 100 MG/1
25 TABLET, FILM COATED ORAL DAILY
Refills: 0 | Status: DISCONTINUED | OUTPATIENT
Start: 2021-11-23 | End: 2021-11-25

## 2021-11-23 RX ORDER — OXYCODONE HYDROCHLORIDE 5 MG/1
5 TABLET ORAL ONCE
Refills: 0 | Status: CANCELLED | OUTPATIENT
Start: 2021-11-24 | End: 2021-11-23

## 2021-11-23 RX ORDER — MEPERIDINE HYDROCHLORIDE 50 MG/ML
12.5 INJECTION INTRAMUSCULAR; INTRAVENOUS; SUBCUTANEOUS
Refills: 0 | Status: CANCELLED | OUTPATIENT
Start: 2021-11-24 | End: 2021-11-23

## 2021-11-23 RX ORDER — MONTELUKAST 4 MG/1
10 TABLET, CHEWABLE ORAL DAILY
Refills: 0 | Status: DISCONTINUED | OUTPATIENT
Start: 2021-11-23 | End: 2021-11-25

## 2021-11-23 RX ORDER — ACETAMINOPHEN 500 MG
975 TABLET ORAL EVERY 8 HOURS
Refills: 0 | Status: DISCONTINUED | OUTPATIENT
Start: 2021-11-23 | End: 2021-11-25

## 2021-11-23 RX ORDER — PANTOPRAZOLE SODIUM 20 MG/1
40 TABLET, DELAYED RELEASE ORAL
Refills: 0 | Status: DISCONTINUED | OUTPATIENT
Start: 2021-11-23 | End: 2021-11-25

## 2021-11-23 RX ORDER — DEXTROSE 50 % IN WATER 50 %
25 SYRINGE (ML) INTRAVENOUS ONCE
Refills: 0 | Status: DISCONTINUED | OUTPATIENT
Start: 2021-11-23 | End: 2021-11-25

## 2021-11-23 RX ORDER — ALLOPURINOL 300 MG
100 TABLET ORAL
Refills: 0 | Status: DISCONTINUED | OUTPATIENT
Start: 2021-11-23 | End: 2021-11-23

## 2021-11-23 RX ORDER — SODIUM CHLORIDE 9 MG/ML
1000 INJECTION, SOLUTION INTRAVENOUS
Refills: 0 | Status: CANCELLED | OUTPATIENT
Start: 2021-11-24 | End: 2021-11-23

## 2021-11-23 RX ORDER — ASCORBIC ACID 60 MG
500 TABLET,CHEWABLE ORAL
Refills: 0 | Status: DISCONTINUED | OUTPATIENT
Start: 2021-11-23 | End: 2021-11-25

## 2021-11-23 RX ORDER — FOLIC ACID 0.8 MG
1 TABLET ORAL DAILY
Refills: 0 | Status: DISCONTINUED | OUTPATIENT
Start: 2021-11-23 | End: 2021-11-25

## 2021-11-23 RX ORDER — ASPIRIN/CALCIUM CARB/MAGNESIUM 324 MG
325 TABLET ORAL
Refills: 0 | Status: DISCONTINUED | OUTPATIENT
Start: 2021-11-24 | End: 2021-11-24

## 2021-11-23 RX ORDER — DEXTROSE 50 % IN WATER 50 %
15 SYRINGE (ML) INTRAVENOUS ONCE
Refills: 0 | Status: DISCONTINUED | OUTPATIENT
Start: 2021-11-23 | End: 2021-11-23

## 2021-11-23 RX ORDER — GLUCAGON INJECTION, SOLUTION 0.5 MG/.1ML
1 INJECTION, SOLUTION SUBCUTANEOUS ONCE
Refills: 0 | Status: DISCONTINUED | OUTPATIENT
Start: 2021-11-23 | End: 2021-11-23

## 2021-11-23 RX ORDER — POLYETHYLENE GLYCOL 3350 17 G/17G
17 POWDER, FOR SOLUTION ORAL AT BEDTIME
Refills: 0 | Status: DISCONTINUED | OUTPATIENT
Start: 2021-11-23 | End: 2021-11-25

## 2021-11-23 RX ORDER — INSULIN LISPRO 100/ML
VIAL (ML) SUBCUTANEOUS
Refills: 0 | Status: DISCONTINUED | OUTPATIENT
Start: 2021-11-23 | End: 2021-11-23

## 2021-11-23 RX ORDER — CELECOXIB 200 MG/1
200 CAPSULE ORAL EVERY 12 HOURS
Refills: 0 | Status: DISCONTINUED | OUTPATIENT
Start: 2021-11-24 | End: 2021-11-24

## 2021-11-23 RX ORDER — ONDANSETRON 8 MG/1
4 TABLET, FILM COATED ORAL ONCE
Refills: 0 | Status: CANCELLED | OUTPATIENT
Start: 2021-11-24 | End: 2021-11-23

## 2021-11-23 RX ORDER — DEXTROSE 50 % IN WATER 50 %
15 SYRINGE (ML) INTRAVENOUS ONCE
Refills: 0 | Status: DISCONTINUED | OUTPATIENT
Start: 2021-11-23 | End: 2021-11-25

## 2021-11-23 RX ORDER — ALLOPURINOL 300 MG
100 TABLET ORAL
Refills: 0 | Status: DISCONTINUED | OUTPATIENT
Start: 2021-11-23 | End: 2021-11-25

## 2021-11-23 RX ORDER — FOLIC ACID 0.8 MG
1 TABLET ORAL DAILY
Refills: 0 | Status: DISCONTINUED | OUTPATIENT
Start: 2021-11-23 | End: 2021-11-23

## 2021-11-23 RX ORDER — ONDANSETRON 8 MG/1
4 TABLET, FILM COATED ORAL EVERY 8 HOURS
Refills: 0 | Status: DISCONTINUED | OUTPATIENT
Start: 2021-11-23 | End: 2021-11-23

## 2021-11-23 RX ORDER — FENTANYL CITRATE 50 UG/ML
50 INJECTION INTRAVENOUS
Refills: 0 | Status: CANCELLED | OUTPATIENT
Start: 2021-11-24 | End: 2021-11-23

## 2021-11-23 RX ORDER — DEXTROSE 50 % IN WATER 50 %
12.5 SYRINGE (ML) INTRAVENOUS ONCE
Refills: 0 | Status: DISCONTINUED | OUTPATIENT
Start: 2021-11-23 | End: 2021-11-23

## 2021-11-23 RX ORDER — METOPROLOL TARTRATE 50 MG
50 TABLET ORAL DAILY
Refills: 0 | Status: DISCONTINUED | OUTPATIENT
Start: 2021-11-23 | End: 2021-11-23

## 2021-11-23 RX ORDER — SODIUM CHLORIDE 9 MG/ML
1000 INJECTION, SOLUTION INTRAVENOUS
Refills: 0 | Status: DISCONTINUED | OUTPATIENT
Start: 2021-11-23 | End: 2021-11-24

## 2021-11-23 RX ORDER — SENNA PLUS 8.6 MG/1
2 TABLET ORAL AT BEDTIME
Refills: 0 | Status: DISCONTINUED | OUTPATIENT
Start: 2021-11-23 | End: 2021-11-25

## 2021-11-23 RX ORDER — LOSARTAN POTASSIUM 100 MG/1
25 TABLET, FILM COATED ORAL DAILY
Refills: 0 | Status: DISCONTINUED | OUTPATIENT
Start: 2021-11-23 | End: 2021-11-23

## 2021-11-23 RX ORDER — INSULIN LISPRO 100/ML
VIAL (ML) SUBCUTANEOUS
Refills: 0 | Status: DISCONTINUED | OUTPATIENT
Start: 2021-11-23 | End: 2021-11-25

## 2021-11-23 RX ORDER — INSULIN LISPRO 100/ML
VIAL (ML) SUBCUTANEOUS AT BEDTIME
Refills: 0 | Status: DISCONTINUED | OUTPATIENT
Start: 2021-11-23 | End: 2021-11-23

## 2021-11-23 RX ORDER — DEXTROSE 50 % IN WATER 50 %
12.5 SYRINGE (ML) INTRAVENOUS ONCE
Refills: 0 | Status: DISCONTINUED | OUTPATIENT
Start: 2021-11-23 | End: 2021-11-25

## 2021-11-23 RX ORDER — ATORVASTATIN CALCIUM 80 MG/1
40 TABLET, FILM COATED ORAL AT BEDTIME
Refills: 0 | Status: DISCONTINUED | OUTPATIENT
Start: 2021-11-23 | End: 2021-11-23

## 2021-11-23 RX ADMIN — Medication 100 MILLIGRAM(S): at 09:47

## 2021-11-23 RX ADMIN — Medication 50 MILLIGRAM(S): at 09:47

## 2021-11-23 RX ADMIN — MORPHINE SULFATE 2 MILLIGRAM(S): 50 CAPSULE, EXTENDED RELEASE ORAL at 06:58

## 2021-11-23 RX ADMIN — Medication 5 MILLIGRAM(S): at 09:46

## 2021-11-23 RX ADMIN — MONTELUKAST 10 MILLIGRAM(S): 4 TABLET, CHEWABLE ORAL at 09:46

## 2021-11-23 RX ADMIN — Medication 1 MILLIGRAM(S): at 09:46

## 2021-11-23 RX ADMIN — MORPHINE SULFATE 2 MILLIGRAM(S): 50 CAPSULE, EXTENDED RELEASE ORAL at 14:30

## 2021-11-23 RX ADMIN — SODIUM CHLORIDE 75 MILLILITER(S): 9 INJECTION, SOLUTION INTRAVENOUS at 03:00

## 2021-11-23 RX ADMIN — LOSARTAN POTASSIUM 25 MILLIGRAM(S): 100 TABLET, FILM COATED ORAL at 09:46

## 2021-11-23 RX ADMIN — MORPHINE SULFATE 4 MILLIGRAM(S): 50 CAPSULE, EXTENDED RELEASE ORAL at 01:37

## 2021-11-23 RX ADMIN — MORPHINE SULFATE 4 MILLIGRAM(S): 50 CAPSULE, EXTENDED RELEASE ORAL at 00:04

## 2021-11-23 RX ADMIN — Medication 102 MILLIGRAM(S): at 09:47

## 2021-11-23 RX ADMIN — FENTANYL CITRATE 25 MICROGRAM(S): 50 INJECTION INTRAVENOUS at 00:05

## 2021-11-23 RX ADMIN — MORPHINE SULFATE 2 MILLIGRAM(S): 50 CAPSULE, EXTENDED RELEASE ORAL at 14:10

## 2021-11-23 NOTE — PROGRESS NOTE ADULT - SUBJECTIVE AND OBJECTIVE BOX
86 y/o F w/ PMH of HTN, a-fib, sleep apnea, dyslipidemia, PPM, carotid artery disease, chronic CHF, psoriatic arthritis, CAD s/p PCI, h/o thrombocystis, p/w mechanical fall. Patient states that she tripped over a single step when she was going to another room, and fell on her L side. Denies hitting head. Denies LOC / dizziness  /LH. C/o L hip pain and R ankle pain. Denies CP, SOB, nausea, vomiting, abdominal pain, fever, chills, cough, runny nose, sore throat      pt c/o left hip pain at site of fx , no sob, no chest pain, no abd pain , no cough, no dysuria , pain controlled       PHYSICAL EXAM:    Daily Height in cm: 160.02 (2021 19:55)    Daily     ICU Vital Signs Last 24 Hrs  T(C): 36.9 (2021 07:25), Max: 37.5 (2021 05:16)  T(F): 98.5 (2021 07:25), Max: 99.5 (2021 05:16)  HR: 82 (2021 07:25) (62 - 82)  BP: 142/49 (2021 07:25) (141/61 - 168/74)  BP(mean): 86 (2021 05:16) (86 - 89)  ABP: --  ABP(mean): --  RR: 17 (2021 07:25) (15 - 18)  SpO2: 96% (2021 07:25) (95% - 99%)      Constitutional- NAD   HEENT: Atraumatic, PAUL, Normal, No congestion  Respiratory: Breath Sounds normal, no rhonchi/wheeze  Cardiovascular: N S1S2;   Gastrointestinal: Abdomen soft, non tender, Bowel Ssounds present  Extremities: No  pedal edema, peripheral pulses present, left Lower ext  limted range of motion due to left hip pain   Neurological: AAO x 3, no gross focal motor deficits                          13.8   19.19 )-----------( 490      ( 2021 05:00 )             45.1       CBC Full  -  ( 2021 05:00 )  WBC Count : 19.19 K/uL  RBC Count : 4.59 M/uL  Hemoglobin : 13.8 g/dL  Hematocrit : 45.1 %  Platelet Count - Automated : 490 K/uL  Mean Cell Volume : 98.3 fl  Mean Cell Hemoglobin : 30.1 pg  Mean Cell Hemoglobin Concentration : 30.6 gm/dL  Auto Neutrophil # : x  Auto Lymphocyte # : x  Auto Monocyte # : x  Auto Eosinophil # : x  Auto Basophil # : x  Auto Neutrophil % : x  Auto Lymphocyte % : x  Auto Monocyte % : x  Auto Eosinophil % : x  Auto Basophil % : x      11-    138  |  104  |  27<H>  ----------------------------<  144<H>  4.3   |  29  |  1.04    Ca    9.3      2021 05:00    TPro  7.1  /  Alb  3.8  /  TBili  1.1  /  DBili  0.3  /  AST  57<H>  /  ALT  79<H>  /  AlkPhos  99  11      LIVER FUNCTIONS - ( 2021 05:00 )  Alb: 3.8 g/dL / Pro: 7.1 gm/dL / ALK PHOS: 99 U/L / ALT: 79 U/L / AST: 57 U/L / GGT: x             PT/INR - ( 2021 15:51 )   PT: 20.0 sec;   INR: 1.77 ratio         PTT - ( 2021 15:51 )  PTT:36.8 sec          Urinalysis Basic - ( 2021 01:29 )    Color: Yellow / Appearance: Clear / S.020 / pH: x  Gluc: x / Ketone: Negative  / Bili: Negative / Urobili: Negative mg/dL   Blood: x / Protein: 15 mg/dL / Nitrite: Negative   Leuk Esterase: Small / RBC: 0-2 /HPF / WBC 6-10   Sq Epi: x / Non Sq Epi: Few / Bacteria: Occasional            MEDICATIONS  (STANDING):  allopurinol 100 milliGRAM(s) Oral two times a day  atorvastatin 40 milliGRAM(s) Oral at bedtime  dextrose 40% Gel 15 Gram(s) Oral once  dextrose 5%. 1000 milliLiter(s) (50 mL/Hr) IV Continuous <Continuous>  dextrose 5%. 1000 milliLiter(s) (100 mL/Hr) IV Continuous <Continuous>  dextrose 50% Injectable 25 Gram(s) IV Push once  dextrose 50% Injectable 12.5 Gram(s) IV Push once  dextrose 50% Injectable 25 Gram(s) IV Push once  folic acid 1 milliGRAM(s) Oral daily  glucagon  Injectable 1 milliGRAM(s) IntraMuscular once  insulin lispro (ADMELOG) corrective regimen sliding scale   SubCutaneous three times a day before meals  insulin lispro (ADMELOG) corrective regimen sliding scale   SubCutaneous at bedtime  lactated ringers. 1000 milliLiter(s) (75 mL/Hr) IV Continuous <Continuous>  latanoprost 0.005% Ophthalmic Solution 1 Drop(s) Both EYES at bedtime  losartan 25 milliGRAM(s) Oral daily  metoprolol succinate ER 50 milliGRAM(s) Oral daily  montelukast 10 milliGRAM(s) Oral daily  predniSONE   Tablet 5 milliGRAM(s) Oral daily

## 2021-11-23 NOTE — ED ADULT NURSE REASSESSMENT NOTE - NS ED NURSE REASSESS COMMENT FT1
Assumed care of patient from Alley GUZMAN. Pt is A&Ox4 in stable condition. Vital signs WNL. Pt resting comfortably in bed with daughter at bedside. Lights dimmed and blankets given. Pt denies further needs at this time. Pt updated on wait status for bed. Will continue to monitor.

## 2021-11-23 NOTE — PROGRESS NOTE ADULT - SUBJECTIVE AND OBJECTIVE BOX
Patient seen and examined in PACU  Complaining of left hip pain    VS:  /91, puls 70, puls Ox 99%      PE:  Dressing D/C/I  NVI LLE  Compartments soft B/L LE  FHL/EHL/TA/GS intact LLE

## 2021-11-23 NOTE — PROGRESS NOTE ADULT - ASSESSMENT
S/P left hip hemiarthroplasty.    Plan:  PT/WBA LLE with walker with posterior hip dislocation precaution  Pain managements  DVT prophylaxis  Abduction pillow all the time when on bed  Incentive spirometry  IV ABX   F/U labs  Ice left hip  NV and compartments check LLE  May discharge to rehab tomorrow or after tomorrow if cleared by medicine and PT and follow as outpatient.

## 2021-11-23 NOTE — PROGRESS NOTE ADULT - SUBJECTIVE AND OBJECTIVE BOX
Orthopedics      Patient seen and examined at bedside. Feeling well. On 2L NC. Pain controlled. No n/v. No acute events overnight.    Vital Signs Last 24 Hrs  T(C): 37.5 (11-23-21 @ 05:16), Max: 37.5 (11-23-21 @ 05:16)  T(F): 99.5 (11-23-21 @ 05:16), Max: 99.5 (11-23-21 @ 05:16)  HR: 62 (11-23-21 @ 05:16) (62 - 64)  BP: 141/61 (11-23-21 @ 05:16) (141/61 - 168/74)  BP(mean): 86 (11-23-21 @ 05:16) (86 - 89)  RR: 15 (11-23-21 @ 05:16) (15 - 18)  SpO2: 97% (11-23-21 @ 05:16) (97% - 99%)                        13.8   19.19 )-----------( 490      ( 23 Nov 2021 05:00 )             45.1     22 Nov 2021 21:29    136    |  103    |  26     ----------------------------<  129    4.4     |  27     |  1.00     Ca    9.2        22 Nov 2021 21:29    TPro  7.1    /  Alb  3.8    /  TBili  0.7    /  DBili  x      /  AST  65     /  ALT  81     /  AlkPhos  108    22 Nov 2021 21:29    PT/INR - ( 22 Nov 2021 21:29 )   PT: 35.2 sec;   INR: 3.18 ratio         PTT - ( 22 Nov 2021 21:29 )  PTT:55.8 sec    PHYSICAL EXAM:  Gen: A&Ox3; NAD.   LLE:  Shortened and externally rotated.   Skin intact, no erythema or ecchymosis.   No bony tenderness to palpation.   +EHL/FHL/TA/GSC.   +SILT L3-S1.   + DP.   Compartments soft and compressible.   (+) Minimal calf tenderness.   (+) Axial Load / Log Roll / Straight Leg Raise.     ASSESSMENT:   87F w/ L femoral neck fx.     PLAN:  - Patient planned for surgical fixation with Left Hip Hemiarthroplasty on 11/23.   - Please document Medical/Cardiac/Other optimization as needed/appropriate.    - NPO   - IVF.   - Multimodal pain control.   - NWB RLE.   - DVT Ppx: Patient on Coumadin at home, last dose morning of 11/22; Please continue holding. May benefit from INR reversal pending AM INR, goal INR < 1.5  - PPM last interrogated 11/5/2021, report in allscripts  - Cardiology consult  - Neurology consult   - BLLE Dopp Neg    - XR RLE reviewed, pending official read  - Ice and elevate as tolerated.   - Will discuss with Dr. Monzon who is in agreement with above plan.

## 2021-11-23 NOTE — CONSULT NOTE ADULT - ASSESSMENT
A/P: 88 y/o F w/ significant PMH of HTN, a-fib, sleep apnea, dyslipidemia, carotid artery disease, chronic CHF, CAD s/p PCI, presented to  ED on 11/22 s/p mechanical fall. Denies hitting her head or any LOC. Consult was called regarding age indeterminate infarcts seen on CT Head taken in ED.    #CAD/CHF/HLD/HTN/A-fib/sleep apnea/carotid artery disease    #Psoriatic Arthritis- causes difficulty ambulating at baseline, patient with no neuro deficits seen on exam today    -Continue all home meds for comorbidities listed above, including atorvastatin  -Continue prednisone and methotrexate for psoriatic arthritis  -Cardiology consult appreciated  -Hold ASA until after surgery for femoral neck fracture  -DVT prophylaxis with SCD  -PT recommended as outpatient    Management d/w Pt / family /     88 y/o F with PMHx of HTN, a-fib, sleep apnea, dyslipidemia, carotid artery disease, chronic CHF, CAD s/p PCI, psoariatic arthritis presented to  ED on 11/22 s/p mechanical fall from one stair. Denies hitting her head or any LOC. CT head revealed age indeterminate infarcts.    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

## 2021-11-23 NOTE — DISCHARGE NOTE PROVIDER - CARE PROVIDER_API CALL
Landon Monzon (DO)  Orthopaedic Surgery  [address] [place] [address]  Phone: [phone]  Fax: [phone]  Follow Up Time:

## 2021-11-23 NOTE — DISCHARGE NOTE PROVIDER - NSDCFUADDINST_GEN_ALL_CORE_FT
1.	Pain Control  2.	Walking with full weight bearing as tolerated, with assistive devices (walker/Cane as Needed)  3.	DVT Prophylaxis for 30 days per AC recommendations  4.	PT as needed  5.	Follow up with Dr. Monzon as Outpatient in 10-14 Days after Discharge from the Hospital or Rehab. Call Office For Appointment.  6.	Remove Staples Post-Op Day 14, and Remove Dressing Post-Op Day 10, with Daily Dressing Changes as Need.  7.	Ice affected area as Needed  8.	Keep Dressing  Clean and dry.   Discharge Instructions for Left Hip Hemiarthroplasty:    1. PAIN CONTROL: See Med Rec.  2. ACTIVITY: Weight Bearing as Tolerated with assistance and rolling walker. Posterior Hip Precautions. Abduction pillow while in bed or chair for 6 weeks.  3. PT: daily, Posterior Hip Precautions.  4. DVT/PE PROPHYLAXIS: Continue DVT/PE Prophylaxis. See Med Rec for Duration and dose.  5. BANDAGE: Change dressing to a new Mepilex Ag bandage POD7 (11/30/21). May change sooner if dressing saturated or falling off. DO NOT REMOVE BANDAGE TO CHECK WOUND ON INTAKE.  6. STAPLES: RN Remove Staples POD14 (12/7/21).  7. SHOWER: Okay to shower. Do not soak, submerge or let shower stream beat on dressing/wound.  8. FOLLOW UP: Follow-up with Orthopedic Surgeon Dr. Monzon in 14 Days. Call Office For Appointment.

## 2021-11-23 NOTE — CONSULT NOTE ADULT - SUBJECTIVE AND OBJECTIVE BOX
87F home-ambulator with assistive devices (cane at home; wheelchair out of the house) who presents to the [facility] ED w/ a c/o of Left hip pain s/p MF at home. Patient states she was walking up a single step between rooms in her house when she tripped on the stair and fell onto her Left hip. Denies HH/LOC. States inability to walk immediately following the injury. Endorses pain-limited weakness in the LLE but denies any associated numbness or tingling. Patient also endorses pain in the RLE at the ankle, present at baseline but worsened since fall, as well as painful "soreness" in the distal RLE at the level of the shin/calf. Denies any previous orthopedic history, but states she says "bad rotators cuffs" that are "worse on the Right side" as well as bilateral knee arthritis. No other orthopedic concerns at this time.    HTN (hypertension)    Afib    Sleep apnea    Hyperlipidemia    Pacemaker    SSS (sick sinus syndrome)    Carotid artery disease    Chronic CHF            Eliquis (Unknown)  penicillin (Rash)      PHYSICAL EXAM:  T(C): 36.7 (11-22-21 @ 19:55), Max: 36.7 (11-22-21 @ 19:55)  HR: --  BP: 168/74 (11-22-21 @ 19:55) (168/74 - 168/74)  RR: --  SpO2: --      PHYSICAL EXAM:  Gen: A&Ox3; NAD.   LLE:  Shortened and externally rotated.   Skin intact, no erythema or ecchymosis.   No bony tenderness to palpation.   +EHL/FHL/TA/GSC.   +SILT L3-S1.   + DP.   Compartments soft and compressible.   (+) Minimal calf tenderness.   (+) Axial Load / Log Roll / Straight Leg Raise.     Secondary Survey:   RLE: (+) TTP over bony prominences of the ankle and foot, SILT, palpable pulses;  (+) Calf TTP with (+) Log Roll and (+) Straight Leg Raise; Compartments soft.   RUE: No TTP over bony prominences, SILT, palpable pulses, full/painless range of motion, compartments soft.   LUE: No TTP over bony prominences, SILT, palpable pulses, full/painless range of motion, compartments soft.   Spine: No bony tenderness. No palpable stepoffs.        IMAGING:  XR L Hip: Displaced Femoral Neck Fx    ASSESSMENT:   87F w/ L femoral neck fx.     PLAN:  - Patient planned for surgical fixation with Left Hip Hemiarthroplasty on 11/23.   - Please document Medical/Cardiac/Other optimization as needed/appropriate.    - NPO at midnight.   - IVF.   - Multimodal pain control.   - NWB RLE.   - DVT Ppx: Patient on Coumadin at home, last dose morning of 11/22; Will hold in anticipation of OR and consult AC team for recommendations moving forward.  - Follow up BLLE Dopplers.    - Follow XR R Hip/Ankle/Foot.   - Ice and elevate as tolerated.   -Will discuss with Dr. Monzon who is in agreement with above plan.    87F home-ambulator with assistive devices (cane at home; wheelchair out of the house) who presents to the [facility] ED w/ a c/o of Left hip pain s/p MF at home. Patient states she was walking up a single step between rooms in her house when she tripped on the stair and fell onto her Left hip. Denies HH/LOC. States inability to walk immediately following the injury. Endorses pain-limited weakness in the LLE but denies any associated numbness or tingling. Patient also endorses pain in the RLE at the ankle, present at baseline but worsened since fall, as well as painful "soreness" in the distal RLE at the level of the shin/calf. Denies any previous orthopedic history, but states she says "bad rotators cuffs" that are "worse on the Right side" as well as bilateral knee arthritis. No other orthopedic concerns at this time.    HTN (hypertension)    Afib    Sleep apnea    Hyperlipidemia    Pacemaker    SSS (sick sinus syndrome)    Carotid artery disease    Chronic CHF            Eliquis (Unknown)  penicillin (Rash)      PHYSICAL EXAM:  T(C): 36.7 (11-22-21 @ 19:55), Max: 36.7 (11-22-21 @ 19:55)  HR: --  BP: 168/74 (11-22-21 @ 19:55) (168/74 - 168/74)  RR: --  SpO2: --      PHYSICAL EXAM:  Gen: A&Ox3; NAD.   LLE:  Shortened and externally rotated.   Skin intact, no erythema or ecchymosis.   No bony tenderness to palpation.   +EHL/FHL/TA/GSC.   +SILT L3-S1.   + DP.   Compartments soft and compressible.   (+) Minimal calf tenderness.   (+) Axial Load / Log Roll / Straight Leg Raise.     Secondary Survey:   RLE: (+) TTP over bony prominences of the ankle and foot, SILT, palpable pulses;  (+) Calf TTP with (+) Log Roll and (+) Straight Leg Raise; Compartments soft.   RUE: No TTP over bony prominences, SILT, palpable pulses, full/painless range of motion, compartments soft.   LUE: No TTP over bony prominences, SILT, palpable pulses, full/painless range of motion, compartments soft.   Spine: No bony tenderness. No palpable stepoffs.        IMAGING:  XR L Hip: Displaced Femoral Neck Fx    ASSESSMENT:   87F w/ L femoral neck fx.     PLAN:  - Patient planned for surgical fixation with Left Hip Hemiarthroplasty on 11/23.   - Please document Medical/Cardiac/Other optimization as needed/appropriate.    - NPO at midnight.   - IVF.   - Multimodal pain control.   - NWB RLE.   - DVT Ppx: Patient on Coumadin at home, last dose morning of 11/22; Will hold in anticipation of OR and consult AC team for recommendations moving forward. May benefit from INR reversal, goal INR < 1.5  - PPM last interrogated 11/5/2021, report in allscripts  - Follow up BLLE Dopplers.    - Follow XR R Hip/Ankle/Foot.   - Ice and elevate as tolerated.   -Will discuss with Dr. Monzon who is in agreement with above plan.

## 2021-11-23 NOTE — DISCHARGE NOTE PROVIDER - NSDCCPCAREPLAN_GEN_ALL_CORE_FT
PRINCIPAL DISCHARGE DIAGNOSIS  Diagnosis: Hip fracture, left  Assessment and Plan of Treatment:       SECONDARY DISCHARGE DIAGNOSES  Diagnosis: Fall at home  Assessment and Plan of Treatment:     Diagnosis: Hypoxemia  Assessment and Plan of Treatment:      PRINCIPAL DISCHARGE DIAGNOSIS  Diagnosis: Hip fracture, left  Assessment and Plan of Treatment:       SECONDARY DISCHARGE DIAGNOSES  Diagnosis: Chronic atrial fibrillation  Assessment and Plan of Treatment: Cont heparin SQ and coumadin till INR is therapeutic

## 2021-11-23 NOTE — DISCHARGE NOTE PROVIDER - NSDCMRMEDTOKEN_GEN_ALL_CORE_FT
allopurinol 100 mg oral tablet: 1 tab(s) orally 2 times a day  aspirin 81 mg oral delayed release tablet: 1 tab(s) orally once a day  Coumadin 2.5 mg oral tablet: 1 tab(s) orally once a day  folic acid 1 mg oral tablet: 1 tab(s) orally once a day  furosemide: 60 milligram(s) orally every other day (alternate with 40mg every other day)  furosemide 40 mg oral tablet: 1 tab(s) orally every other day (alternate with 60mg PO daily)  latanoprost 0.005% ophthalmic solution: 1 drop(s) to each affected eye once a day (in the evening)  losartan 25 mg oral tablet: 1 tab(s) orally once a day  Metoprolol Succinate ER 50 mg oral tablet, extended release: 1 tab(s) orally once a day  montelukast 10 mg oral tablet: 1 tab(s) orally once a day  potassium chloride 10 mEq oral capsule, extended release: 1 cap(s) orally once a day  predniSONE 5 mg oral tablet: 1 tab(s) orally once a day  rosuvastatin 5 mg oral tablet: 1 tab(s) orally once a day (at bedtime)   acetaminophen 325 mg oral tablet: 2 tab(s) orally every 6 hours, As Needed for mild pain  allopurinol 100 mg oral tablet: 1 tab(s) orally 2 times a day  ascorbic acid 500 mg oral tablet: 1 tab(s) orally 2 times a day  aspirin 81 mg oral delayed release tablet: 1 tab(s) orally once a day  calcium-vitamin D 500 mg-5 mcg (200 intl units) oral tablet: 1 tab(s) orally 3 times a day  Coumadin 2.5 mg oral tablet: 1 tab(s) orally once a day  folic acid 1 mg oral tablet: 1 tab(s) orally once a day  furosemide: 60 milligram(s) orally every other day (alternate with 40mg every other day)  furosemide 40 mg oral tablet: 1 tab(s) orally every other day (alternate with 60mg PO daily)  heparin: 5000 unit(s) subcutaneous every 8 hours till INR &gt;2.0  latanoprost 0.005% ophthalmic solution: 1 drop(s) to each affected eye once a day (in the evening)  losartan 25 mg oral tablet: 1 tab(s) orally once a day  Metoprolol Succinate ER 50 mg oral tablet, extended release: 1 tab(s) orally once a day  montelukast 10 mg oral tablet: 1 tab(s) orally once a day  Multiple Vitamins oral tablet: 1 tab(s) orally once a day  oxyCODONE 10 mg oral tablet: 1 tab(s) orally every 4 hours, As needed, Severe Pain  oxyCODONE 5 mg oral tablet: 1 tab(s) orally every 4 hours, As needed, Moderate Pain  pantoprazole 40 mg oral delayed release tablet: 1 tab(s) orally once a day (before a meal)  potassium chloride 10 mEq oral capsule, extended release: 1 cap(s) orally once a day  predniSONE 5 mg oral tablet: 1 tab(s) orally once a day  rosuvastatin 5 mg oral tablet: 1 tab(s) orally once a day (at bedtime)

## 2021-11-23 NOTE — DISCHARGE NOTE PROVIDER - HOSPITAL COURSE
Orthopedic Summary  H&P:  Pt is a 87y Female    PAST MEDICAL & SURGICAL HISTORY:  HTN (hypertension)    Afib    Sleep apnea    Hyperlipidemia    Pacemaker    SSS (sick sinus syndrome)    Carotid artery disease    Chronic CHF    Pacemaker          Now s/p Left Hip Hemiarthroplasty for fracture. Pt is afebrile with stable vital signs. Pain is controlled. Exam reveals intact EHL FHL TA GS, +DP. Dressing is clean and dry.    Hospital Course:  Patient presented to [facility] ED after a fall, found to have a left hip fracture, and admitted to the Medical Service. Pt was medically/cardiac cleared prior to surgery. Prophylactic antibiotics were started before the procedure and continued for 24 hours. They were admitted after surgery to the orthopedic floor.  There were no orthopedic complications during the hospital stay. All home medications were continued.    Routine consults were obtained from the Anticoagulation Team for DVT/PE prophylaxis, from Physical Therapy, and followed by Medicine for Co-management. Patient was placed on  anticoagulation.  Pertinent home medications were continued.  Daily labs were followed.      On POD 0 there were no major issues. Pt received PT daily and was Discharged once cleared per Medicine.  The orthopedic Attending is aware and agrees. See addendum to DC summary per medical team below for any additional info or if any changes. Orthopedic Summary  H&P:  Pt is a 87y Female    PAST MEDICAL & SURGICAL HISTORY:  HTN (hypertension)  Afib  Sleep apnea  Hyperlipidemia  Pacemaker  SSS (sick sinus syndrome)  Carotid artery disease  Chronic CHF  Pacemaker    Now s/p Left Hip Hemiarthroplasty for fracture. Pt is afebrile with stable vital signs. Pain is controlled. Exam reveals intact EHL FHL TA GS, +DP. Dressing is clean and dry.    Hospital Course:  Patient presented to [facility] ED after a fall, found to have a left hip fracture, and admitted to the Medical Service. Pt was medically/cardiac cleared prior to surgery. Prophylactic antibiotics were started before the procedure and continued for 24 hours. They were admitted after surgery to the orthopedic floor.  There were no orthopedic complications during the hospital stay. All home medications were continued.    Routine consults were obtained from the Anticoagulation Team for DVT/PE prophylaxis, from Physical Therapy, and followed by Medicine for Co-management. Patient was placed on  anticoagulation.  Pertinent home medications were continued.  Daily labs were followed.      On POD 0 there were no major issues. Pt received PT daily and was Discharged once cleared per Medicine.  The orthopedic Attending is aware and agrees. See addendum to DC summary per medical team below for any additional info or if any changes.    Postop without any significant complications. Coumadin resumed. HH stable. Going to rehab today

## 2021-11-23 NOTE — H&P ADULT - ASSESSMENT
88 y/o F w/ PMH of HTN, a-fib, sleep apnea, dyslipidemia, PPM, carotid artery disease, psoriatic arthritis, chronic CHF, CAD s/p PCI, h/o thromboytosis, p/w mechanical fall.     *L femoral neck fracture s/p mechanical fall   -Cardio consult for risk stratification given cardiac comorbidities - CAD / CHF / PPM / A-fib   -Hold coumadin, and re-check INR in AM  -EKG   -Pain control   -F/u x-ray of R hip / ankle / foot   -Lasix held overnight given that patient is NPO    *Age-indeterminate infarcts noted on CT w/ h/o a-fib   -Patient and family state that she doesn't have a previous h/o CVA. Will consult neuro given new finding. However, patient doesn't recall any neurological symptoms. Will defer to neuro regarding doing full work up inpatient vs outpatient given that this is likely not acute   -Statin  -Will need ASA s/p surgery when okay with ortho     *Multiple lytic foci of calvarium noted on CTH  -F/u outpatient w/ heme/onc and outpatient PET CT vs bone scan for further evalaution     *Leukocytosis   -Possibly reactive  -Will need to trend to check for resolution     *Lactic acidosis  -Now resolved     *Thyroid enlargement  -Outpatient endo referral + outpatient thyroid U/S     *Thrombocytosis / Transaminitis   -If stable -> f/u outpatient for further work up   -Thrombocytosis is being worked up by Dr. Arenas     *H/o HTN / sleep apnea / dyslipidemia / carotid artery disease / psoriatic arthritis   -C/w home meds (except as noted above), and f/u outpatient for further management if conditions remain stable during hospitalization     *DVT ppx  -RLE SCD    88 y/o F w/ PMH of HTN, a-fib, sleep apnea, dyslipidemia, PPM, carotid artery disease, psoriatic arthritis, chronic CHF, CAD s/p PCI, h/o thrombocytosis, p/w mechanical fall.     *L femoral neck fracture s/p mechanical fall   -Cardio consult for risk stratification given cardiac comorbidities - CAD / CHF / PPM / A-fib   -Hold coumadin, and re-check INR in AM  -EKG   -Pain control   -F/u x-ray of R hip / ankle / foot   -Lasix held overnight given that patient is NPO    *Age-indeterminate infarcts noted on CT w/ h/o a-fib   -Patient and family state that she doesn't have a previous h/o CVA. Will consult neuro given new finding. However, patient doesn't recall any neurological symptoms. Will defer to neuro regarding doing full work up inpatient vs outpatient given that this is likely not acute   -Statin  -Will need ASA s/p surgery when okay with ortho     *Multiple lytic foci of calvarium noted on CTH  -F/u outpatient w/ heme/onc and outpatient PET CT vs bone scan for further evalaution     *Leukocytosis   -Possibly reactive  -Will need to trend to check for resolution     *Lactic acidosis  -Now resolved     *Thyroid enlargement  -Outpatient endo referral + outpatient thyroid U/S     *Thrombocytosis / Transaminitis   -If stable -> f/u outpatient for further work up   -Thrombocytosis is being worked up by Dr. Arenas     *H/o HTN / sleep apnea / dyslipidemia / carotid artery disease / psoriatic arthritis   -C/w home meds (except as noted above), and f/u outpatient for further management if conditions remain stable during hospitalization     *DVT ppx  -RLE SCD

## 2021-11-23 NOTE — CONSULT NOTE ADULT - SUBJECTIVE AND OBJECTIVE BOX
CC: Mechanical fall  (23 Nov 2021 13:23)    HPI:  86 y/o F w/ significant PMH of HTN, a-fib, sleep apnea, dyslipidemia, carotid artery disease, chronic CHF, CAD s/p PCI, presented to  ED on 11/22 s/p mechanical fall. Patient states that she tripped over a step when she was going to another room, and fell onto her left side. Denies hitting her head, denies any LOC. CT Brain taken in ED shows volume loss, microvascular disease, no sign of hemorrhage or midline shift.     Patient was seen today at bedside by neurology team. She is awake and alert. She denies any neurologicaenies LOC / dizziness  /LH. C/o L hip pain and R ankle pain. Denies CP, SOB, nausea, vomiting, abdominal pain, fever, chills, cough, runny nose, sore throat     PSH: PPM placement, PCI    Social Hx: Denies x 3    Family hx: Parents - CAD, sister - breast cancer  (23 Nov 2021 03:54)       Patient presented to ED at  Last known normal was at  Patient was evaluated by ED physician (name), who called code stroke.  CT Brain and CT Angio Head/Neck showed (document no hemorrhage if TPA was given)  TPA was given at (time)    PAST MEDICAL & SURGICAL HISTORY:  HTN (hypertension)    Afib    Sleep apnea    Hyperlipidemia    Pacemaker    SSS (sick sinus syndrome)    Carotid artery disease    Chronic CHF    Pacemaker        FAMILY HISTORY:  CAD (coronary artery disease) (Father, Mother)    Family history of breast cancer in sister (Sibling)        Social Hx:  Nonsmoker, no drug or alcohol use    MEDICATIONS  (STANDING):  allopurinol 100 milliGRAM(s) Oral two times a day  atorvastatin 40 milliGRAM(s) Oral at bedtime  dextrose 40% Gel 15 Gram(s) Oral once  dextrose 5%. 1000 milliLiter(s) (50 mL/Hr) IV Continuous <Continuous>  dextrose 5%. 1000 milliLiter(s) (100 mL/Hr) IV Continuous <Continuous>  dextrose 50% Injectable 25 Gram(s) IV Push once  dextrose 50% Injectable 12.5 Gram(s) IV Push once  dextrose 50% Injectable 25 Gram(s) IV Push once  folic acid 1 milliGRAM(s) Oral daily  glucagon  Injectable 1 milliGRAM(s) IntraMuscular once  insulin lispro (ADMELOG) corrective regimen sliding scale   SubCutaneous three times a day before meals  insulin lispro (ADMELOG) corrective regimen sliding scale   SubCutaneous at bedtime  lactated ringers. 1000 milliLiter(s) (75 mL/Hr) IV Continuous <Continuous>  latanoprost 0.005% Ophthalmic Solution 1 Drop(s) Both EYES at bedtime  losartan 25 milliGRAM(s) Oral daily  metoprolol succinate ER 50 milliGRAM(s) Oral daily  montelukast 10 milliGRAM(s) Oral daily  predniSONE   Tablet 5 milliGRAM(s) Oral daily       Allergies    Eliquis (Unknown)  penicillin (Rash)    Intolerances        ROS: Pertinent positives in HPI, all other ROS were reviewed and are negative.      Vital Signs Last 24 Hrs  T(C): 36.9 (23 Nov 2021 07:25), Max: 37.5 (23 Nov 2021 05:16)  T(F): 98.5 (23 Nov 2021 07:25), Max: 99.5 (23 Nov 2021 05:16)  HR: 82 (23 Nov 2021 07:25) (62 - 82)  BP: 142/49 (23 Nov 2021 07:25) (141/61 - 168/74)  BP(mean): 86 (23 Nov 2021 05:16) (86 - 89)  RR: 17 (23 Nov 2021 07:25) (15 - 18)  SpO2: 96% (23 Nov 2021 07:25) (95% - 99%)    Physical Exam:    General: Normocephalic, NAD      Constitutional: awake and alert.  HEENT: PERRLA, EOMI,   Neck: Supple.  Respiratory: Breath sounds are clear bilaterally  Cardiovascular: S1 and S2, regular / irregular rhythm  Gastrointestinal: soft, nontender  Extremities:  no edema  Vascular: Caritid Bruit - no  Musculoskeletal: no joint swelling/tenderness, no abnormal movements  Skin: No rashes    Neurological exam:  HF: A x O x 3. Appropriately interactive, normal affect. Speech fluent, No Aphasia or paraphasic errors. Naming /repetition intact   CN: GUNJAN, EOMI, VFF, facial sensation normal, no NLFD, tongue midline, Palate moves equally, SCM equal bilaterally  Motor: No pronator drift, Strength 5/5 in all 4 ext, normal bulk and tone, no tremor, rigidity or bradykinesia.    Sens: Intact to light touch / PP/ VS/ JS    Reflexes: Symmetric and normal . BJ 2+, BR 2+, KJ 2+, AJ 2+, downgoing toes b/l  Coord:  No FNFA, dysmetria, PIPO intact   Gait/Balance: Normal/Cannot test    NIHSS:          Labs:   11-23    138  |  104  |  27<H>  ----------------------------<  144<H>  4.3   |  29  |  1.04    Ca    9.3      23 Nov 2021 05:00    TPro  7.1  /  Alb  3.8  /  TBili  1.1  /  DBili  0.3  /  AST  57<H>  /  ALT  79<H>  /  AlkPhos  99  11-23 11-23 Chol 127 LDL -- HDL 58 Trig 56                          13.8   19.19 )-----------( 490      ( 23 Nov 2021 05:00 )             45.1       Radiology:  - CT Head:  - MRI brain  -MRA brain/Carotids  - EEG    A/P:     No IV tpa given because…    #    -ASA/PLAVIX or No ASA plavix for 24 hrs (if TPA was given)  -Atorvastatin  -DVT prophylaxis  -Dysphagia screen  -Speech and swallow eval  -PT eval/ rehab eval    Mangement d/w Pt / family /     Total Critical Care Time spent:   CC: Mechanical fall  (23 Nov 2021 13:23)    HPI:  88 y/o F w/ significant PMH of HTN, a-fib, sleep apnea, dyslipidemia, carotid artery disease, chronic CHF, CAD s/p PCI, presented to  ED on 11/22 s/p mechanical fall. Patient states that she tripped over a step when she was going to another room, and fell onto her left side. Denies hitting her head, denies any LOC. CT Brain taken in ED shows volume loss, microvascular disease, no sign of hemorrhage or midline shift.     Patient was seen today at bedside by neurology team. She is awake and alert. She states she has a history of psoriatic arthritis, and is currently on maintenance doses of methotrexate and prednisone. She has difficulty ambulating due to her arthritis, and therefore ambulates with walker at baseline. Denies any tremors, shuffling gait, sleep disturbance, or motor slowing.  Denies CP, SOB, nausea, vomiting, numbness, tingling, vision changes or headache. Admits to left hip and right ankle pain secondary to recent mechanical fall and left femoral neck fracture.     PAST MEDICAL & SURGICAL HISTORY:  HTN (hypertension)  Afib  Sleep apnea  Hyperlipidemia  Pacemaker  SSS (sick sinus syndrome)  Carotid artery disease  Chronic CHF  Pacemaker  PCI    FAMILY HISTORY:  CAD (coronary artery disease) (Father, Mother)  Family history of breast cancer in sister (Sibling)     Social Hx:  Nonsmoker, no drug or alcohol use    MEDICATIONS  (STANDING):  allopurinol 100 milliGRAM(s) Oral two times a day  atorvastatin 40 milliGRAM(s) Oral at bedtime  dextrose 40% Gel 15 Gram(s) Oral once  dextrose 5%. 1000 milliLiter(s) (50 mL/Hr) IV Continuous <Continuous>  dextrose 5%. 1000 milliLiter(s) (100 mL/Hr) IV Continuous <Continuous>  dextrose 50% Injectable 25 Gram(s) IV Push once  dextrose 50% Injectable 12.5 Gram(s) IV Push once  dextrose 50% Injectable 25 Gram(s) IV Push once  folic acid 1 milliGRAM(s) Oral daily  glucagon  Injectable 1 milliGRAM(s) IntraMuscular once  insulin lispro (ADMELOG) corrective regimen sliding scale   SubCutaneous three times a day before meals  insulin lispro (ADMELOG) corrective regimen sliding scale   SubCutaneous at bedtime  lactated ringers. 1000 milliLiter(s) (75 mL/Hr) IV Continuous <Continuous>  latanoprost 0.005% Ophthalmic Solution 1 Drop(s) Both EYES at bedtime  losartan 25 milliGRAM(s) Oral daily  metoprolol succinate ER 50 milliGRAM(s) Oral daily  montelukast 10 milliGRAM(s) Oral daily  predniSONE   Tablet 5 milliGRAM(s) Oral daily     Allergies:  Eliquis (Unknown)  penicillin (Rash)    ROS: Pertinent positives in HPI, all other ROS were reviewed and are negative.      Vital Signs Last 24 Hrs  T(C): 36.9 (23 Nov 2021 07:25), Max: 37.5 (23 Nov 2021 05:16)  T(F): 98.5 (23 Nov 2021 07:25), Max: 99.5 (23 Nov 2021 05:16)  HR: 82 (23 Nov 2021 07:25) (62 - 82)  BP: 142/49 (23 Nov 2021 07:25) (141/61 - 168/74)  BP(mean): 86 (23 Nov 2021 05:16) (86 - 89)  RR: 17 (23 Nov 2021 07:25) (15 - 18)  SpO2: 96% (23 Nov 2021 07:25) (95% - 99%)    Physical Exam:    General: Normocephalic, NAD  HEENT: PERRLA, EOMI   Neck: Supple.  Cardiovascular: S1 and S2, regular / irregular rhythm  Gastrointestinal: soft, nontender  Extremities:  no edema  Musculoskeletal: no abnormal movements  Skin: No rashes    Neurological exam:  HF: A x O x 3. Appropriately interactive, normal affect. Speech fluent, No Aphasia or paraphasic errors. Naming/repetition intact   CN: GUNJAN, EOMI, VFF, facial sensation normal, no NLFD  Motor: No pronator drift, Strength 5/5 in all 4 ext, normal bulk and tone, no tremor, rigidity or bradykinesia.    Sens: Intact to light touch bilaterally UE and LE  Reflexes: Symmetric and normal  Coordination:  No FNFA, dysmetria, PIPO intact   Gait/Balance: Did not test      Labs:   11-23    138  |  104  |  27<H>  ----------------------------<  144<H>  4.3   |  29  |  1.04    Ca    9.3      23 Nov 2021 05:00    TPro  7.1  /  Alb  3.8  /  TBili  1.1  /  DBili  0.3  /  AST  57<H>  /  ALT  79<H>  /  AlkPhos  99  11-23 11-23 Chol 127 LDL -- HDL 58 Trig 56                          13.8   19.19 )-----------( 490      ( 23 Nov 2021 05:00 )             45.1       Radiology:    CT Head (11.22.21 @ 20:57)    FINDINGS:  Ventricles and sulci: There is been progressive increase in size of the abdomen sulci consistent with moderate volume loss, commensurate with patient age.  Intra-axial: There is nonspecific decreased attenuation in the periventricular cervical white matter, likely sequela of microvascular disease, with remote and age indeterminate infarcts, there is an unchanged posterior fossa arachnoid cyst dylan cisterna magna, and marked atherosclerotic calcification of the carotid siphons. No intracranial mass, acute hemorrhage, or significant midline shift is present.  Extra-axial: There is no large hemorrhagic extra-axial collection.    Visualized sinuses: No air-fluid levels are identified. There is minimal mucosal thickening within the maxillary ethmoid frontal and sphenoid sinuses, partially seen dental disease along the right maxillary molar roots (3:1) with extension to the right maxillary sinus.    Visualized mastoids:  Opacification along the right greater than left mastoid tip.  Calvarium: Intact, with hyperostosis frontalis interna, with increased multiple scattered lytic foci, from prior (3:44-48, correlate with additional imaging such as Imprimis and bone scan or PET CT may be obtained as clinically warranted..    IMPRESSION:    No Volume loss, microvascular disease, no hemorrhage or midline shift. If symptoms persist consider follow-up head CT or MR ifno contraindication.         CC: Mechanical fall  (23 Nov 2021 13:23)    HPI:  88 y/o F w/ significant PMH of HTN, a-fib, sleep apnea, dyslipidemia, carotid artery disease, chronic CHF, CAD s/p PCI, presented to  ED on 11/22 s/p mechanical fall. Patient states that she tripped over a step when she was going to another room, and fell onto her left side. Denies hitting her head, denies any LOC. CT Brain taken in ED shows volume loss, microvascular disease, no sign of hemorrhage or midline shift, Age indeterminate infarcts noted.    Patient was seen today at bedside, she is awake and alert, states she has a history of psoriatic arthritis, and is currently on maintenance doses of methotrexate and prednisone. She has difficulty ambulating due to her arthritis, and therefore ambulates with walker at baseline. Denies any tremors, shuffling gait, sleep disturbance, or motor slowing.  Denies CP, SOB, nausea, vomiting, numbness, tingling, vision changes or headache. Admits to left hip and right ankle pain secondary to recent mechanical fall and left femoral neck fracture.       PAST MEDICAL & SURGICAL HISTORY:  HTN (hypertension)  Afib  Sleep apnea  Hyperlipidemia  Pacemaker  SSS (sick sinus syndrome)  Carotid artery disease  Chronic CHF  Pacemaker  PCI      FAMILY HISTORY:  CAD (coronary artery disease) (Father, Mother)  Family history of breast cancer in sister (Sibling)       Social Hx:  Nonsmoker, no drug or alcohol use      MEDICATIONS  (STANDING):  allopurinol 100 milliGRAM(s) Oral two times a day  atorvastatin 40 milliGRAM(s) Oral at bedtime  dextrose 40% Gel 15 Gram(s) Oral once  dextrose 5%. 1000 milliLiter(s) (50 mL/Hr) IV Continuous <Continuous>  dextrose 5%. 1000 milliLiter(s) (100 mL/Hr) IV Continuous <Continuous>  dextrose 50% Injectable 25 Gram(s) IV Push once  dextrose 50% Injectable 12.5 Gram(s) IV Push once  dextrose 50% Injectable 25 Gram(s) IV Push once  folic acid 1 milliGRAM(s) Oral daily  glucagon  Injectable 1 milliGRAM(s) IntraMuscular once  insulin lispro (ADMELOG) corrective regimen sliding scale   SubCutaneous three times a day before meals  insulin lispro (ADMELOG) corrective regimen sliding scale   SubCutaneous at bedtime  lactated ringers. 1000 milliLiter(s) (75 mL/Hr) IV Continuous <Continuous>  latanoprost 0.005% Ophthalmic Solution 1 Drop(s) Both EYES at bedtime  losartan 25 milliGRAM(s) Oral daily  metoprolol succinate ER 50 milliGRAM(s) Oral daily  montelukast 10 milliGRAM(s) Oral daily  predniSONE   Tablet 5 milliGRAM(s) Oral daily       Allergies:  Eliquis (Unknown)  penicillin (Rash)      ROS: Pertinent positives in HPI, all other ROS were reviewed and are negative.        Vital Signs Last 24 Hrs  T(C): 36.9 (23 Nov 2021 07:25), Max: 37.5 (23 Nov 2021 05:16)  T(F): 98.5 (23 Nov 2021 07:25), Max: 99.5 (23 Nov 2021 05:16)  HR: 82 (23 Nov 2021 07:25) (62 - 82)  BP: 142/49 (23 Nov 2021 07:25) (141/61 - 168/74)  BP(mean): 86 (23 Nov 2021 05:16) (86 - 89)  RR: 17 (23 Nov 2021 07:25) (15 - 18)  SpO2: 96% (23 Nov 2021 07:25) (95% - 99%)      Physical Exam:  General: Normocephalic, NAD  HEENT: PERRLA, EOMI   Neck: Supple.  Cardiovascular: S1 and S2, regular  Extremities:  no edema  Musculoskeletal: no abnormal movements  Skin: Chronic distal LE edema + hyperpigmentation      Neurological exam:  HF: A x O x 3. Appropriately interactive, normal affect. Speech fluent, No Aphasia, hypophonia. Naming/repetition intact   CN: GUNJAN, EOMI, VFF, facial sensation normal, no NLFD  Motor: No pronator drift, Strength 5/5 in all 4 ext, normal bulk , no tremor, rigidity or bradykinesia.    Sens: Intact to light touch bilaterally UE and LE  Reflexes: Symmetric and normal  Coordination:  No FNFA, dysmetria, PIPO intact   Gait/Balance: Did not test      Labs:   11-23    138  |  104  |  27<H>  ----------------------------<  144<H>  4.3   |  29  |  1.04    Ca    9.3      23 Nov 2021 05:00    TPro  7.1  /  Alb  3.8  /  TBili  1.1  /  DBili  0.3  /  AST  57<H>  /  ALT  79<H>  /  AlkPhos  99  11-23 11-23 Chol 127 LDL -- HDL 58 Trig 56                          13.8   19.19 )-----------( 490      ( 23 Nov 2021 05:00 )             45.1       Radiology:    CT Head (11.22.21 @ 20:57)    FINDINGS:  Ventricles and sulci: There is been progressive increase in size of the abdomen sulci consistent with moderate volume loss, commensurate with patient age.  Intra-axial: There is nonspecific decreased attenuation in the periventricular cervical white matter, likely sequela of microvascular disease, with remote and age indeterminate infarcts, there is an unchanged posterior fossa arachnoid cyst dylan cisterna magna, and marked atherosclerotic calcification of the carotid siphons. No intracranial mass, acute hemorrhage, or significant midline shift is present.  Extra-axial: There is no large hemorrhagic extra-axial collection.    Visualized sinuses: No air-fluid levels are identified. There is minimal mucosal thickening within the maxillary ethmoid frontal and sphenoid sinuses, partially seen dental disease along the right maxillary molar roots (3:1) with extension to the right maxillary sinus.    Visualized mastoids:  Opacification along the right greater than left mastoid tip.  Calvarium: Intact, with hyperostosis frontalis interna, with increased multiple scattered lytic foci, from prior (3:44-48, correlate with additional imaging such as Imprimis and bone scan or PET CT may be obtained as clinically warranted..    IMPRESSION:    No Volume loss, microvascular disease, no hemorrhage or midline shift. If symptoms persist consider follow-up head CT or MR ifno contraindication.

## 2021-11-23 NOTE — PROGRESS NOTE ADULT - ASSESSMENT
86 y/o F w/ PMH of HTN, a-fib, sleep apnea, dyslipidemia, PPM, carotid artery disease, psoriatic arthritis, chronic CHF, CAD s/p PCI, h/o thrombocytosis, p/w mechanical fall.     *L femoral neck fracture s/p mechanical fall   -Cardio consult for risk stratification given cardiac comorbidities - CAD / CHF / PPM / A-fib   -Hold coumadin,, given vit K for reversal for OR  No acute contraindications for hemiarthroplasty as planned by Ortho    Currently  clinically not in acute CHF, given history of valvular heart disease, monitor for CHF post surgery.  CT chest no PNA , mild pulm vascular congestion   clinically no evidence of acute infection   would need pre op stress dose steroids  -Pain control   -F/u x-ray of R hip / ankle / foot   -Lasix held overnight given that patient is NPO    *Age-indeterminate infarcts noted on CT w/ h/o a-fib   -Patient and family state that she doesn't have a previous h/o CVA. Will consult neuro given new finding. However, patient doesn't recall any neurological symptoms. Will defer to neuro regarding doing full work up inpatient vs outpatient given that this is likely not acute   -Statin  -Will need ASA s/p surgery when okay with ortho     *Multiple lytic foci of calvarium noted on CTH  -F/u outpatient w/ heme/onc and outpatient PET CT vs bone scan for further evalaution     *Leukocytosis   -Possibly reactive to fracture   -Will need to trend to check for resolution     *Lactic acidosis likely from dehydration  -Now resolved     *Thyroid enlargement  -Outpatient endo referral + outpatient thyroid U/S   *Thrombocytosis / Transaminitis   -If stable -> f/u outpatient for further work up   -Thrombocytosis is being worked up by Dr. Arenas     *H/o HTN / sleep apnea / dyslipidemia / carotid artery disease / psoriatic arthritis   -C/w home meds (except as noted above), and f/u outpatient for further management if conditions remain stable during hospitalization     *DVT ppx  -RLE SCD  86 y/o F w/ PMH of HTN, a-fib, sleep apnea, dyslipidemia, PPM, carotid artery disease, psoriatic arthritis, chronic CHF, CAD s/p PCI, h/o thrombocytosis, p/w mechanical fall.     *L femoral neck fracture s/p mechanical fall   -Cardio consult for risk stratification given cardiac comorbidities - CAD / CHF / PPM / A-fib   -Hold coumadin,, given vit K for reversal for OR  No acute contraindications for hemiarthroplasty as planned by Ortho    Currently  clinically not in acute CHF, given history of valvular heart disease, monitor for CHF post surgery.  CT chest no PNA , mild pulm vascular congestion   clinically no evidence of acute infection   would need pre op stress dose steroids- hydrocortizone 50mg Iv x1 preop   -Pain control   -F/u x-ray of R hip / ankle / foot   -Lasix held overnight given that patient is NPO    *Age-indeterminate infarcts noted on CT w/ h/o a-fib   -Patient and family state that she doesn't have a previous h/o CVA. Will consult neuro given new finding. However, patient doesn't recall any neurological symptoms. Will defer to neuro regarding doing full work up inpatient vs outpatient given that this is likely not acute   -Statin  -Will need ASA s/p surgery when okay with ortho     *Multiple lytic foci of calvarium noted on CTH  -F/u outpatient w/ heme/onc and outpatient PET CT vs bone scan for further evalaution     *Leukocytosis   -Possibly reactive to fracture   -Will need to trend to check for resolution     *Lactic acidosis likely from dehydration  -Now resolved     *Thyroid enlargement  -Outpatient endo referral + outpatient thyroid U/S   *Thrombocytosis / Transaminitis   -If stable -> f/u outpatient for further work up   -Thrombocytosis is being worked up by Dr. Arenas     *H/o HTN / sleep apnea / dyslipidemia / carotid artery disease / psoriatic arthritis   -C/w home meds (except as noted above), and f/u outpatient for further management if conditions remain stable during hospitalization     *DVT ppx  -RLE SCD

## 2021-11-23 NOTE — H&P ADULT - HISTORY OF PRESENT ILLNESS
86 y/o F w/ PMH of HTN, a-fib, sleep apnea, dyslipidemia, PPM, carotid artery disease, chronic CHF, psoriatic arthritis, CAD s/p PCI, h/o thrombocystis, p/w mechanical fall. Patient states that she tripped over a single step when she was going to another room, and fell on her L side. Denies hitting head. Denies LOC / dizziness  /LH. C/o L hip pain and R ankle pain. Denies CP, SOB, nausea, vomiting, abdominal pain, fever, chills, cough, runny nose, sore throat     PSH: PPM placement, PCI    Social Hx: Denies x 3    Family hx: Parents - CAD, sister - breast cancer

## 2021-11-23 NOTE — CONSULT NOTE ADULT - SUBJECTIVE AND OBJECTIVE BOX
Patient is a 87y old  Female who presents with a chief complaint of mechanical fall (2021 05:48)    ________________________________  ISA ARAMBULA is a 87y year old Female with a past medical history of chronic atrial fibrillation anticoagulation on warfarin (previously could not tolerate Eliquis and did not want to try Xarelto), history of moderate mitral regurgitation, sick sinus syndrome status post dual chamber pacemaker with abandoned lead in the right side chest wall, high cholesterol, coronary artery disease status post cardiac catheterization [facility]  at that time she had a 30% proximal LAD and 95% proximal RCA she is status post drug-eluting stent to RCA, obstructive sleep apnea, she has moderate mitral regurgitation, moderate pulmonary hypertension, arthritis and history of thrombocytosis.    Patient presents status post mechanical fall from home, resultant hip fracture.  She will need surgery which is scheduled later today.  INR is elevated and she has received vitamin K.  She denies any chest discomfort.  She denies any palpitations.  She denies any shortness of breath.  She denies any syncope.  Chest x-ray shows no abnormalities.  EKG shows ventricular paced rhythm.      PREVIOUS CARDIAC WORKUP:    Echocardiogram 21  --There is severe left atrial dilatation (LA volume index 49 ml/m²).  --There is mild left ventricular hypertrophy.  --LV global wall motion is normal.  --LV ejection fraction (55 %) is normal.  --There is a pacemaker in the right heart.  --The aortic valve is mildly calcified.  --There is moderate mitral regurgitation. By the PISA method, the EROA is 30.06mm 2.  --There is moderate tricuspid regurgitation. RVSP 53mmHg.  --The right atrial pressure is 6 - 10 mm Hg. There is moderate pulmonary hypertension.  --There is no pericardial effusion. IVC slightly dilated.    Stress Test  Cardiac Catheterization  ________________________________  Review of systems: A 10 point review of system has been performed, and is negative except for what has been mentioned in the above history of present illness.     PAST MEDICAL & SURGICAL HISTORY:  HTN (hypertension)    Afib    Sleep apnea    Hyperlipidemia    Pacemaker    SSS (sick sinus syndrome)    Carotid artery disease    Chronic CHF    Pacemaker      FAMILY HISTORY:  CAD (coronary artery disease) (Father, Mother)    Family history of breast cancer in sister (Sibling)       The patient denies any history of premature CAD or sudden cardiac death.    SOCIAL HISTORY: The patient denies any history of tobacco abuse, alcohol abuse or illicit drug use.    ALLERGIES:  Eliquis (Unknown)  penicillin (Rash)    Home Medications:  allopurinol 100 mg oral tablet: 1 tab(s) orally 2 times a day (2021 04:29)  aspirin 81 mg oral delayed release tablet: 1 tab(s) orally once a day (:29)  Coumadin 2.5 mg oral tablet: 1 tab(s) orally once a day (:)  folic acid 1 mg oral tablet: 1 tab(s) orally once a day (:)  furosemide: 60 milligram(s) orally every other day (alternate with 40mg every other day) (:29)  furosemide 40 mg oral tablet: 1 tab(s) orally every other day (alternate with 60mg PO daily) (:29)  latanoprost 0.005% ophthalmic solution: 1 drop(s) to each affected eye once a day (in the evening) (:)  losartan 25 mg oral tablet: 1 tab(s) orally once a day (:29)  Metoprolol Succinate ER 50 mg oral tablet, extended release: 1 tab(s) orally once a day (:29)  montelukast 10 mg oral tablet: 1 tab(s) orally once a day (:29)  potassium chloride 10 mEq oral capsule, extended release: 1 cap(s) orally once a day (:29)  predniSONE 5 mg oral tablet: 1 tab(s) orally once a day (:29)  rosuvastatin 5 mg oral tablet: 1 tab(s) orally once a day (at bedtime) (:29)    MEDICATIONS  (STANDING):  allopurinol 100 milliGRAM(s) Oral two times a day  atorvastatin 40 milliGRAM(s) Oral at bedtime  dextrose 40% Gel 15 Gram(s) Oral once  dextrose 5%. 1000 milliLiter(s) (50 mL/Hr) IV Continuous <Continuous>  dextrose 5%. 1000 milliLiter(s) (100 mL/Hr) IV Continuous <Continuous>  dextrose 50% Injectable 25 Gram(s) IV Push once  dextrose 50% Injectable 12.5 Gram(s) IV Push once  dextrose 50% Injectable 25 Gram(s) IV Push once  folic acid 1 milliGRAM(s) Oral daily  glucagon  Injectable 1 milliGRAM(s) IntraMuscular once  insulin lispro (ADMELOG) corrective regimen sliding scale   SubCutaneous three times a day before meals  insulin lispro (ADMELOG) corrective regimen sliding scale   SubCutaneous at bedtime  lactated ringers. 1000 milliLiter(s) (75 mL/Hr) IV Continuous <Continuous>  latanoprost 0.005% Ophthalmic Solution 1 Drop(s) Both EYES at bedtime  losartan 25 milliGRAM(s) Oral daily  metoprolol succinate ER 50 milliGRAM(s) Oral daily  montelukast 10 milliGRAM(s) Oral daily  predniSONE   Tablet 5 milliGRAM(s) Oral daily    MEDICATIONS  (PRN):  acetaminophen     Tablet .. 650 milliGRAM(s) Oral every 6 hours PRN Temp greater or equal to 38C (100.4F), Mild Pain (1 - 3)  aluminum hydroxide/magnesium hydroxide/simethicone Suspension 30 milliLiter(s) Oral every 4 hours PRN Dyspepsia  melatonin 3 milliGRAM(s) Oral at bedtime PRN Insomnia  morphine  - Injectable 2 milliGRAM(s) IV Push every 4 hours PRN Moderate Pain (4 - 6)  ondansetron Injectable 4 milliGRAM(s) IV Push every 8 hours PRN Nausea and/or Vomiting    Vital Signs Last 24 Hrs  T(C): 36.9 (2021 07:25), Max: 37.5 (2021 05:16)  T(F): 98.5 (2021 07:25), Max: 99.5 (2021 05:16)  HR: 82 (2021 07:25) (62 - 82)  BP: 142/49 (2021 07:25) (141/61 - 168/74)  BP(mean): 86 (2021 05:16) (86 - 89)  RR: 17 (2021 07:25) (15 - 18)  SpO2: 96% (2021 07:25) (95% - 99%)  I&O's Summary    ________________________________  GENERAL APPEARANCE:  No acute distress  HEAD: normocephalic, atraumatic  NECK: supple, no jugular venous distention, no carotid bruit    HEART: Regular rate and rhythm, S1, S2 normal, 1/6 murmur    CHEST:  No anterior chest wall tenderness    LUNGS:  Clear to auscultation, without any wheezing, rhonchi or rales    ABDOMEN: soft, nontender, nondistended, with positive bowel sounds appreciated  EXTREMITIES: Mild lower extremity edema  NEURO: Alert and oriented x3  PSYC:  Normal affect  SKIN:  Dry  ________________________________   TELEMETRY: Not on telemetry    ECG: Ventricular paced rhythm    LABS:                        13.8   19.19 )-----------( 490      ( 2021 05:00 )             45.1             11-    138  |  104  |  27<H>  ----------------------------<  144<H>  4.3   |  29  |  1.04    Ca    9.3      2021 05:00    TPro  7.1  /  Alb  3.8  /  TBili  1.1  /  DBili  0.3  /  AST  57<H>  /  ALT  79<H>  /  AlkPhos  99  11-23      Lipid Panel  Chl 127  HDL 58  LDL --  Trg 56  LIVER FUNCTIONS - ( 2021 05:00 )  Alb: 3.8 g/dL / Pro: 7.1 gm/dL / ALK PHOS: 99 U/L / ALT: 79 U/L / AST: 57 U/L / GGT: x         PT/INR - ( 2021 05:00 )   PT: 35.1 sec;   INR: 3.20 ratio         PTT - ( 2021 05:00 )  PTT:48.6 sec  Urinalysis Basic - ( 2021 01:29 )    Color: Yellow / Appearance: Clear / S.020 / pH: x  Gluc: x / Ketone: Negative  / Bili: Negative / Urobili: Negative mg/dL   Blood: x / Protein: 15 mg/dL / Nitrite: Negative   Leuk Esterase: Small / RBC: 0-2 /HPF / WBC 6-10   Sq Epi: x / Non Sq Epi: Few / Bacteria: Occasional      Pro BNP  661  @ 21:29  D Dimer  --  @ 21:29    PT/INR - ( 2021 05:00 )   PT: 35.1 sec;   INR: 3.20 ratio         PTT - ( 2021 05:00 )  PTT:48.6 sec  Urinalysis Basic - ( 2021 01:29 )    Color: Yellow / Appearance: Clear / S.020 / pH: x  Gluc: x / Ketone: Negative  / Bili: Negative / Urobili: Negative mg/dL   Blood: x / Protein: 15 mg/dL / Nitrite: Negative   Leuk Esterase: Small / RBC: 0-2 /HPF / WBC 6-10   Sq Epi: x / Non Sq Epi: Few / Bacteria: Occasional             ________________________________    RADIOLOGY & ADDITIONAL STUDIES: Chest x-ray clear  ________________________________    ASSESSMENT:  Coronary disease status post PCI in 2018 to the RCA  Chronic atrial fibrillation on anticoagulation  Sick sinus syndrome status post dual-chamber pacemaker in the right side, with a abandon pacemaker lead in the left chest wall  Moderate mitral valve regurgitation  Moderate pulmonary hypertension  Preop cardiovascular risk optimization      PLAN:  In summary, this is a 87y Female with a past medical history of coronary disease, sick sinus syndrome, chronic atrial fibrillation, who presents status post mechanical fall.    Vitamin K given for elevated INR prior to surgery.  Recommend resuming warfarin when safe from a surgical standpoint.  Recommend resuming aspirin after surgery given history of CAD.  Continue with statin.  Continue beta-blocker.  Currently not in CHF, given history of valvular heart disease, monitor for CHF post surgery.  From a cardiac standpoint she is optimized for her surgery and can proceed later today and acceptable cardiac risk without any cardiac contraindication.      __________________________________________________________________________  Thank you for allowing me to participate in the care of your patient. Please contact me should any questions arise.    PITOR Daugherty DO, [facility]

## 2021-11-23 NOTE — CHART NOTE - NSCHARTNOTEFT_GEN_A_CORE
Orthopedics Note:    INR 1.77 after Vitamin K 10mg IV today. Will plan to proceed with Hemiarthroplasty tonight.  Medicine and Cardiology clearance notes appreciated.    Case discussed with and plan as per Dr. Monzon.

## 2021-11-23 NOTE — PROVIDER CONTACT NOTE (OTHER) - SITUATION
risk stratification pre op    left message with ans service for dr to see pt in the morning
called md office spoke with Marta
age-indeterminate infarcts noted on CT    left message with ans service for dr to see pt in the morning

## 2021-11-24 LAB
ALBUMIN SERPL ELPH-MCNC: 3 G/DL — LOW (ref 3.3–5)
ALP SERPL-CCNC: 67 U/L — SIGNIFICANT CHANGE UP (ref 40–120)
ALT FLD-CCNC: 52 U/L — SIGNIFICANT CHANGE UP (ref 12–78)
ANION GAP SERPL CALC-SCNC: 4 MMOL/L — LOW (ref 5–17)
ANION GAP SERPL CALC-SCNC: 5 MMOL/L — SIGNIFICANT CHANGE UP (ref 5–17)
APTT BLD: 30 SEC — SIGNIFICANT CHANGE UP (ref 27.5–35.5)
APTT BLD: 30.7 SEC — SIGNIFICANT CHANGE UP (ref 27.5–35.5)
AST SERPL-CCNC: 38 U/L — HIGH (ref 15–37)
BASOPHILS # BLD AUTO: 0.12 K/UL — SIGNIFICANT CHANGE UP (ref 0–0.2)
BASOPHILS NFR BLD AUTO: 0.6 % — SIGNIFICANT CHANGE UP (ref 0–2)
BILIRUB SERPL-MCNC: 0.8 MG/DL — SIGNIFICANT CHANGE UP (ref 0.2–1.2)
BUN SERPL-MCNC: 23 MG/DL — SIGNIFICANT CHANGE UP (ref 7–23)
BUN SERPL-MCNC: 24 MG/DL — HIGH (ref 7–23)
CALCIUM SERPL-MCNC: 8.7 MG/DL — SIGNIFICANT CHANGE UP (ref 8.5–10.1)
CALCIUM SERPL-MCNC: 8.9 MG/DL — SIGNIFICANT CHANGE UP (ref 8.5–10.1)
CHLORIDE SERPL-SCNC: 107 MMOL/L — SIGNIFICANT CHANGE UP (ref 96–108)
CHLORIDE SERPL-SCNC: 108 MMOL/L — SIGNIFICANT CHANGE UP (ref 96–108)
CO2 SERPL-SCNC: 27 MMOL/L — SIGNIFICANT CHANGE UP (ref 22–31)
CO2 SERPL-SCNC: 28 MMOL/L — SIGNIFICANT CHANGE UP (ref 22–31)
CREAT SERPL-MCNC: 0.92 MG/DL — SIGNIFICANT CHANGE UP (ref 0.5–1.3)
CREAT SERPL-MCNC: 1.11 MG/DL — SIGNIFICANT CHANGE UP (ref 0.5–1.3)
EOSINOPHIL # BLD AUTO: 0.67 K/UL — HIGH (ref 0–0.5)
EOSINOPHIL NFR BLD AUTO: 3.6 % — SIGNIFICANT CHANGE UP (ref 0–6)
GLUCOSE SERPL-MCNC: 138 MG/DL — HIGH (ref 70–99)
GLUCOSE SERPL-MCNC: 139 MG/DL — HIGH (ref 70–99)
HCT VFR BLD CALC: 34.6 % — SIGNIFICANT CHANGE UP (ref 34.5–45)
HCT VFR BLD CALC: 35.9 % — SIGNIFICANT CHANGE UP (ref 34.5–45)
HGB BLD-MCNC: 10.6 G/DL — LOW (ref 11.5–15.5)
HGB BLD-MCNC: 10.9 G/DL — LOW (ref 11.5–15.5)
IMM GRANULOCYTES NFR BLD AUTO: 1 % — SIGNIFICANT CHANGE UP (ref 0–1.5)
INR BLD: 1.29 RATIO — HIGH (ref 0.88–1.16)
INR BLD: 1.32 RATIO — HIGH (ref 0.88–1.16)
LYMPHOCYTES # BLD AUTO: 0.83 K/UL — LOW (ref 1–3.3)
LYMPHOCYTES # BLD AUTO: 4.5 % — LOW (ref 13–44)
MCHC RBC-ENTMCNC: 30.4 GM/DL — LOW (ref 32–36)
MCHC RBC-ENTMCNC: 30.4 PG — SIGNIFICANT CHANGE UP (ref 27–34)
MCHC RBC-ENTMCNC: 30.4 PG — SIGNIFICANT CHANGE UP (ref 27–34)
MCHC RBC-ENTMCNC: 30.6 GM/DL — LOW (ref 32–36)
MCV RBC AUTO: 100 FL — SIGNIFICANT CHANGE UP (ref 80–100)
MCV RBC AUTO: 99.1 FL — SIGNIFICANT CHANGE UP (ref 80–100)
MONOCYTES # BLD AUTO: 1.05 K/UL — HIGH (ref 0–0.9)
MONOCYTES NFR BLD AUTO: 5.7 % — SIGNIFICANT CHANGE UP (ref 2–14)
NEUTROPHILS # BLD AUTO: 15.68 K/UL — HIGH (ref 1.8–7.4)
NEUTROPHILS NFR BLD AUTO: 84.6 % — HIGH (ref 43–77)
PLATELET # BLD AUTO: 405 K/UL — HIGH (ref 150–400)
PLATELET # BLD AUTO: 427 K/UL — HIGH (ref 150–400)
POTASSIUM SERPL-MCNC: 4.1 MMOL/L — SIGNIFICANT CHANGE UP (ref 3.5–5.3)
POTASSIUM SERPL-MCNC: 4.6 MMOL/L — SIGNIFICANT CHANGE UP (ref 3.5–5.3)
POTASSIUM SERPL-SCNC: 4.1 MMOL/L — SIGNIFICANT CHANGE UP (ref 3.5–5.3)
POTASSIUM SERPL-SCNC: 4.6 MMOL/L — SIGNIFICANT CHANGE UP (ref 3.5–5.3)
PROT SERPL-MCNC: 5.6 GM/DL — LOW (ref 6–8.3)
PROTHROM AB SERPL-ACNC: 14.9 SEC — HIGH (ref 10.6–13.6)
PROTHROM AB SERPL-ACNC: 15.2 SEC — HIGH (ref 10.6–13.6)
RBC # BLD: 3.49 M/UL — LOW (ref 3.8–5.2)
RBC # BLD: 3.59 M/UL — LOW (ref 3.8–5.2)
RBC # FLD: 21.2 % — HIGH (ref 10.3–14.5)
RBC # FLD: 21.4 % — HIGH (ref 10.3–14.5)
SODIUM SERPL-SCNC: 139 MMOL/L — SIGNIFICANT CHANGE UP (ref 135–145)
SODIUM SERPL-SCNC: 140 MMOL/L — SIGNIFICANT CHANGE UP (ref 135–145)
WBC # BLD: 18.37 K/UL — HIGH (ref 3.8–10.5)
WBC # BLD: 18.53 K/UL — HIGH (ref 3.8–10.5)
WBC # FLD AUTO: 18.37 K/UL — HIGH (ref 3.8–10.5)
WBC # FLD AUTO: 18.53 K/UL — HIGH (ref 3.8–10.5)

## 2021-11-24 PROCEDURE — 93010 ELECTROCARDIOGRAM REPORT: CPT

## 2021-11-24 PROCEDURE — 99233 SBSQ HOSP IP/OBS HIGH 50: CPT

## 2021-11-24 PROCEDURE — 99223 1ST HOSP IP/OBS HIGH 75: CPT

## 2021-11-24 RX ORDER — HEPARIN SODIUM 5000 [USP'U]/ML
5000 INJECTION INTRAVENOUS; SUBCUTANEOUS EVERY 8 HOURS
Refills: 0 | Status: DISCONTINUED | OUTPATIENT
Start: 2021-11-24 | End: 2021-11-25

## 2021-11-24 RX ORDER — FUROSEMIDE 40 MG
40 TABLET ORAL DAILY
Refills: 0 | Status: DISCONTINUED | OUTPATIENT
Start: 2021-11-24 | End: 2021-11-25

## 2021-11-24 RX ORDER — ASPIRIN/CALCIUM CARB/MAGNESIUM 324 MG
81 TABLET ORAL DAILY
Refills: 0 | Status: DISCONTINUED | OUTPATIENT
Start: 2021-11-24 | End: 2021-11-25

## 2021-11-24 RX ORDER — WARFARIN SODIUM 2.5 MG/1
5 TABLET ORAL ONCE
Refills: 0 | Status: COMPLETED | OUTPATIENT
Start: 2021-11-24 | End: 2021-11-24

## 2021-11-24 RX ADMIN — PANTOPRAZOLE SODIUM 40 MILLIGRAM(S): 20 TABLET, DELAYED RELEASE ORAL at 09:06

## 2021-11-24 RX ADMIN — Medication 325 MILLIGRAM(S): at 06:20

## 2021-11-24 RX ADMIN — Medication 1 MILLIGRAM(S): at 09:06

## 2021-11-24 RX ADMIN — Medication 5 MILLIGRAM(S): at 09:06

## 2021-11-24 RX ADMIN — LOSARTAN POTASSIUM 25 MILLIGRAM(S): 100 TABLET, FILM COATED ORAL at 09:06

## 2021-11-24 RX ADMIN — POLYETHYLENE GLYCOL 3350 17 GRAM(S): 17 POWDER, FOR SOLUTION ORAL at 21:56

## 2021-11-24 RX ADMIN — ATORVASTATIN CALCIUM 20 MILLIGRAM(S): 80 TABLET, FILM COATED ORAL at 21:57

## 2021-11-24 RX ADMIN — HEPARIN SODIUM 5000 UNIT(S): 5000 INJECTION INTRAVENOUS; SUBCUTANEOUS at 10:49

## 2021-11-24 RX ADMIN — Medication 1 TABLET(S): at 13:11

## 2021-11-24 RX ADMIN — CELECOXIB 200 MILLIGRAM(S): 200 CAPSULE ORAL at 06:40

## 2021-11-24 RX ADMIN — Medication 975 MILLIGRAM(S): at 21:57

## 2021-11-24 RX ADMIN — MONTELUKAST 10 MILLIGRAM(S): 4 TABLET, CHEWABLE ORAL at 09:06

## 2021-11-24 RX ADMIN — CELECOXIB 200 MILLIGRAM(S): 200 CAPSULE ORAL at 06:20

## 2021-11-24 RX ADMIN — WARFARIN SODIUM 5 MILLIGRAM(S): 2.5 TABLET ORAL at 21:57

## 2021-11-24 RX ADMIN — Medication 40 MILLIGRAM(S): at 17:09

## 2021-11-24 RX ADMIN — Medication 100 MILLIGRAM(S): at 09:07

## 2021-11-24 RX ADMIN — Medication 975 MILLIGRAM(S): at 13:11

## 2021-11-24 RX ADMIN — Medication 1 TABLET(S): at 09:06

## 2021-11-24 RX ADMIN — Medication 500 MILLIGRAM(S): at 21:57

## 2021-11-24 RX ADMIN — Medication 100 MILLIGRAM(S): at 10:16

## 2021-11-24 RX ADMIN — Medication 325 MILLIGRAM(S): at 09:06

## 2021-11-24 RX ADMIN — Medication 100 MILLIGRAM(S): at 21:57

## 2021-11-24 RX ADMIN — Medication 1 TABLET(S): at 06:20

## 2021-11-24 RX ADMIN — HEPARIN SODIUM 5000 UNIT(S): 5000 INJECTION INTRAVENOUS; SUBCUTANEOUS at 21:58

## 2021-11-24 RX ADMIN — Medication 975 MILLIGRAM(S): at 15:01

## 2021-11-24 RX ADMIN — Medication 500 MILLIGRAM(S): at 09:07

## 2021-11-24 RX ADMIN — Medication 975 MILLIGRAM(S): at 06:39

## 2021-11-24 RX ADMIN — Medication 100 MILLIGRAM(S): at 03:07

## 2021-11-24 RX ADMIN — Medication 975 MILLIGRAM(S): at 06:20

## 2021-11-24 RX ADMIN — Medication 50 MILLIGRAM(S): at 09:06

## 2021-11-24 RX ADMIN — Medication 975 MILLIGRAM(S): at 22:02

## 2021-11-24 RX ADMIN — Medication 1 TABLET(S): at 21:57

## 2021-11-24 NOTE — CONSULT NOTE ADULT - SUBJECTIVE AND OBJECTIVE BOX
HPI:  86 y/o F w/ PMH of HTN, a-fib, sleep apnea, dyslipidemia, PPM, carotid artery disease, chronic CHF, psoriatic arthritis, CAD s/p PCI, h/o thrombocystis, p/w mechanical fall. Patient states that she tripped over a single step when she was going to another room, and fell on her L side. Denies hitting head. Denies LOC / dizziness  /LH. C/o L hip pain and R ankle pain. Denies CP, SOB, nausea, vomiting, abdominal pain, fever, chills, cough, runny nose, sore throat     PSH: PPM placement, PCI    Social Hx: Denies x 3    Family hx: Parents - CAD, sister - breast cancer  (2021 03:54)      Patient is a 87y old  Female who presents with a chief complaint of mechanical fall (2021 05:31)      Consulted by    for VTE prophylaxis, risk stratification, and anticoagulation management.    PAST MEDICAL & SURGICAL HISTORY:  HTN (hypertension)    Afib    Sleep apnea    Hyperlipidemia    Pacemaker    SSS (sick sinus syndrome)    Carotid artery disease    Chronic CHF    Pacemaker    Interval History  21:Patient seen at bedside Discussed anticoagulation , patient is on Coumadin due to Afib history , denies any h/o VTE, INR today is 1.29 patient received Vit K preop, Patient is OOB to chair, walked few steps will resume Coumadin with Heparin SQ, Verbalized understanding and is in agreement with treatment plan.        CrCl:43  EBL:200ml  BMI:30.1    Caprini VTE Risk Score:CAPRINI SCORE  AGE RELATED RISK FACTORS                                                       MOBILITY RELATED FACTORS  [ ] Age 41-60 years                                            (1 Point)                  [ ] Bed rest /restricted mobility                             (1 Point)  [ ] Age: 61-74 years                                           (2 Points)                [ ] Plaster cast                                                   (2 Points)  [x ] Age= 75 years                                              (3 Points)                 [ ] Bed bound for more than 72 hours                   (2 Points)    DISEASE RELATED RISK FACTORS                                               GENDER SPECIFIC FACTORS  [ ] Edema in the lower extremities                       (1 Point)           [ ] Pregnancy                                                            (1 Point)  [ ] Varicose veins                                               (1 Point)                  [ ] Post-partum < 6 weeks                                      (1 Point)             [x ] BMI > 25 Kg/m2                                            (1 Point)                  [ ] Hormonal therapy or oral contraception       (1 Point)                 [ ] Sepsis (in the previous month)                        (1 Point)             [ ] History of pregnancy complications                (1Point)  [ ] Pneumonia or serious lung disease                                             [ ] Unexplained or recurrent  (=/>3), premature                                 (In the previous month)                               (1 Point)                birth with toxemia or growth-restricted infant (1 Point)  [ ] Abnormal pulmonary function test            (1 Point)                                   SURGERY RELATED RISK FACTORS  [ ] Acute myocardial infarction                       (1 Point)                  [ ]  Section                                         (1 Point)  [ ] Congestive heart failure (in the previous month) (1 Point)   [ ] Minor surgery   lasting <45 minutes       (1 Point)   [ ] Inflammatory bowel disease                             (1 Point)          [ ] Arthroscopic surgery                                  (2 Points)  [ ] Central venous access                                    (2 Points)            [ ] General surgery lasting >45 minutes      (2 Points)       [ ] Stroke (in the previous month)                  (5 Points)            [ ] Elective major lower extremity arthroplasty (5 Points)                                   [  ] Malignancy (present or past include skin melanoma                                          but exclude  basal skin cell)    (2 points)                                      TRAUMA RELATED RISK FACTORS                HEMATOLOGY RELATED FACTORS                                  [ x] Fracture of the hip, pelvis, or leg                       (5 Points)  [ ] Prior episodes of VTE                                     (3 Points)          [ ] Acute spinal cord injury (in the previous month)  (5 Points)  [ ] Positive family history for VTE                         (3 Points)       [ ] Paralysis (less than 1 month)                          (5 Points)  [ ] Prothrombin 62476 A                                      (3 Points)         [ ] Multiple Trauma (within 1month)                 (5Points)                                                                                                                                                                [ ] Factor V Leiden                                          (3 Points)                                OTHER RISK FACTORS                          [ ] Lupus anticoagulants                                     (3 Points)                       [ ] BMI > 40                          (1 Point)                                                         [ ] Anticardiolipin antibodies                                (3 Points)                   [ ] Smoking                              (1Point)                                                [ ] High homocysteine in the blood                      (3 Points)                [  ] Diabetes requiring insulin (1point)                         [ ] Other congenital or acquired thrombophilia       (3 Points)          [  ] Chemotherapy                   (1 Point)  [ ] Heparin induced thrombocytopenia                  (3 Points)             [  ] Blood Transfusion                (1 point)                                                                                                             Total Score [    9      ]                                                                                                                                                                                                                                                                                                                                                                                                                                             [id number] Score: 6    IMPROVE Bleeding Risk Score:4.5      Falls Risk:   High ( x )  Mod (  )  Low (  )      FAMILY HISTORY:  CAD (coronary artery disease) (Father, Mother)    Family history of breast cancer in sister (Sibling)      Denies any personal or familial history of clotting or bleeding disorders.    Allergies    Eliquis (Unknown)  penicillin (Rash)    Intolerances        REVIEW OF SYSTEMS    (  )Fever	     (  )Constipation	(  )SOB				(  )Headache	(  )Dysuria  (  )Chills	     (  )Melena	(  )Dyspnea present on exertion	                    (  )Dizziness                    (  )Polyuria  (  )Nausea	     (  )Hematochezia	(  )Cough			                    (  )Syncope   	(  )Hematuria  (  )Vomiting    (  )Chest Pain	(  )Wheezing			(  )Weakness  (  )Diarrhea     (  )Palpitations	(  )Anorexia			( x )joint pain    All  other review of systems negative: Yes    Vital Signs Last 24 Hrs  T(C): 36.5 (2021 08:30), Max: 37.1 (2021 17:46)  T(F): 97.7 (2021 08:30), Max: 98.7 (2021 17:46)  HR: 60 (2021 08:30) (59 - 69)  BP: 106/57 (2021 08:30) (104/46 - 148/40)  BP(mean): 77 (2021 17:46) (77 - 77)  RR: 16 (2021 08:30) (11 - 18)  SpO2: 98% (2021 08:30) (92% - 100%)    PHYSICAL EXAM:    Constitutional: Appears Well    Neurological: A& O x 3    Skin: Warm    Respiratory and Thorax: normal effort; Breath sounds: normal; No rales/wheezing/rhonchi  	  Cardiovascular: S1, S2, regular, NMBR	    Gastrointestinal: BS + x 4Q, nontender	    Genitourinary:  Bladder nondistended, nontender    Musculoskeletal:   General Right:   no muscle/joint tenderness,   normal tone, no joint swelling,   ROM: full	    General Left:   + muscle/joint tenderness,   normal tone, no joint swelling,   ROM: limited    Hip:     Left: Dressing CDI;         Lower extrems:   Right: no calf tenderness              negative denton's sign               + pedal pulses    Left:   no calf tenderness              negative denton's sign               + pedal pulses                          10.9   18.53 )-----------( 427      ( 2021 06:36 )             35.9       11-24    140  |  107  |  23  ----------------------------<  138<H>  4.6   |  28  |  1.11    Ca    8.9      2021 06:36    TPro  5.6<L>  /  Alb  3.0<L>  /  TBili  0.8  /  DBili  x   /  AST  38<H>  /  ALT  52  /  AlkPhos  67  11-24      PT/INR - ( 2021 06:36 )   PT: 14.9 sec;   INR: 1.29 ratio         PTT - ( 2021 06:36 )  PTT:30.0 sec				    MEDICATIONS  (STANDING):  acetaminophen     Tablet .. 975 milliGRAM(s) Oral every 8 hours  allopurinol 100 milliGRAM(s) Oral two times a day  ascorbic acid 500 milliGRAM(s) Oral two times a day  aspirin  chewable 81 milliGRAM(s) Oral daily  atorvastatin 20 milliGRAM(s) Oral at bedtime  calcium carbonate 1250 mG  + Vitamin D (OsCal 500 + D) 1 Tablet(s) Oral three times a day  dextrose 40% Gel 15 Gram(s) Oral once  dextrose 5%. 1000 milliLiter(s) IV Continuous <Continuous>  dextrose 5%. 1000 milliLiter(s) IV Continuous <Continuous>  dextrose 50% Injectable 25 Gram(s) IV Push once  dextrose 50% Injectable 12.5 Gram(s) IV Push once  dextrose 50% Injectable 25 Gram(s) IV Push once  ferrous    sulfate 325 milliGRAM(s) Oral daily  folic acid 1 milliGRAM(s) Oral daily  glucagon  Injectable 1 milliGRAM(s) IntraMuscular once  heparin   Injectable 5000 Unit(s) SubCutaneous every 8 hours  insulin lispro (ADMELOG) corrective regimen sliding scale   SubCutaneous three times a day before meals  lactated ringers. 1000 milliLiter(s) IV Continuous <Continuous>  latanoprost 0.005% Ophthalmic Solution 1 Drop(s) Both EYES at bedtime  losartan 25 milliGRAM(s) Oral daily  metoprolol succinate ER 50 milliGRAM(s) Oral daily  montelukast 10 milliGRAM(s) Oral daily  multivitamin 1 Tablet(s) Oral daily  pantoprazole    Tablet 40 milliGRAM(s) Oral before breakfast  polyethylene glycol 3350 17 Gram(s) Oral at bedtime  predniSONE   Tablet 5 milliGRAM(s) Oral daily  senna 2 Tablet(s) Oral at bedtime  warfarin 5 milliGRAM(s) Oral once          DVT Prophylaxis:  LMWH                   (  )  Heparin SQ           ( x )  Coumadin             ( x )  Xarelto                  (  )  Eliquis                   (  )  Venodynes           ( x )  Ambulation          ( x )  UFH                       (  )  Contraindicated  (  )  EC ASPIRIN       (  )

## 2021-11-24 NOTE — PROGRESS NOTE ADULT - SUBJECTIVE AND OBJECTIVE BOX
The patient was seen and examined. No acute events overnight.  No chest pain.  No shortness of breath.  No palpitations.  POD 1 - working w/ PT    Initial Consult HPI  ________________________________  ISA ARAMBULA is a 87y year old Female with a past medical history of chronic atrial fibrillation anticoagulation on warfarin (previously could not tolerate Eliquis and did not want to try Xarelto), history of moderate mitral regurgitation, sick sinus syndrome status post dual chamber pacemaker with abandoned lead in the right side chest wall, high cholesterol, coronary artery disease status post cardiac catheterization [place] 2018 at that time she had a 30% proximal LAD and 95% proximal RCA she is status post drug-eluting stent to RCA, obstructive sleep apnea, she has moderate mitral regurgitation, moderate pulmonary hypertension, arthritis and history of thrombocytosis.    Patient presents status post mechanical fall from home, resultant hip fracture.  She will need surgery which is scheduled later today.  INR is elevated and she has received vitamin K.  She denies any chest discomfort.  She denies any palpitations.  She denies any shortness of breath.  She denies any syncope.  Chest x-ray shows no abnormalities.  EKG shows ventricular paced rhythm.      PREVIOUS CARDIAC WORKUP:    Echocardiogram 9/27/21  --There is severe left atrial dilatation (LA volume index 49 ml/m²).  --There is mild left ventricular hypertrophy.  --LV global wall motion is normal.  --LV ejection fraction (55 %) is normal.  --There is a pacemaker in the right heart.  --The aortic valve is mildly calcified.  --There is moderate mitral regurgitation. By the PISA method, the EROA is 30.06mm 2.  --There is moderate tricuspid regurgitation. RVSP 53mmHg.  --The right atrial pressure is 6 - 10 mm Hg. There is moderate pulmonary hypertension.  --There is no pericardial effusion. IVC slightly dilated.    ________________________________  Review of systems: A 10 point review of system has been performed, and is negative except for what has been mentioned in the above history of present illness.     PAST MEDICAL & SURGICAL HISTORY:  HTN (hypertension)    Afib    Sleep apnea    Hyperlipidemia    Pacemaker    SSS (sick sinus syndrome)    Carotid artery disease    Chronic CHF    Pacemaker        ALLERGIES:  Eliquis (Unknown)  penicillin (Rash)    Home Medications:  allopurinol 100 mg oral tablet: 1 tab(s) orally 2 times a day (23 Nov 2021 04:29)  aspirin 81 mg oral delayed release tablet: 1 tab(s) orally once a day (23 Nov 2021 04:29)  Coumadin 2.5 mg oral tablet: 1 tab(s) orally once a day (23 Nov 2021 04:29)  folic acid 1 mg oral tablet: 1 tab(s) orally once a day (23 Nov 2021 04:29)  furosemide: 60 milligram(s) orally every other day (alternate with 40mg every other day) (23 Nov 2021 04:29)  furosemide 40 mg oral tablet: 1 tab(s) orally every other day (alternate with 60mg PO daily) (23 Nov 2021 04:29)  latanoprost 0.005% ophthalmic solution: 1 drop(s) to each affected eye once a day (in the evening) (23 Nov 2021 04:29)  losartan 25 mg oral tablet: 1 tab(s) orally once a day (23 Nov 2021 04:29)  Metoprolol Succinate ER 50 mg oral tablet, extended release: 1 tab(s) orally once a day (23 Nov 2021 04:29)  montelukast 10 mg oral tablet: 1 tab(s) orally once a day (23 Nov 2021 04:29)  potassium chloride 10 mEq oral capsule, extended release: 1 cap(s) orally once a day (23 Nov 2021 04:29)  predniSONE 5 mg oral tablet: 1 tab(s) orally once a day (23 Nov 2021 04:29)  rosuvastatin 5 mg oral tablet: 1 tab(s) orally once a day (at bedtime) (23 Nov 2021 04:29)    MEDICATIONS  (STANDING):  acetaminophen     Tablet .. 975 milliGRAM(s) Oral every 8 hours  allopurinol 100 milliGRAM(s) Oral two times a day  ascorbic acid 500 milliGRAM(s) Oral two times a day  aspirin  chewable 81 milliGRAM(s) Oral daily  atorvastatin 20 milliGRAM(s) Oral at bedtime  calcium carbonate 1250 mG  + Vitamin D (OsCal 500 + D) 1 Tablet(s) Oral three times a day  dextrose 40% Gel 15 Gram(s) Oral once  dextrose 5%. 1000 milliLiter(s) (50 mL/Hr) IV Continuous <Continuous>  dextrose 5%. 1000 milliLiter(s) (100 mL/Hr) IV Continuous <Continuous>  dextrose 50% Injectable 25 Gram(s) IV Push once  dextrose 50% Injectable 12.5 Gram(s) IV Push once  dextrose 50% Injectable 25 Gram(s) IV Push once  ferrous    sulfate 325 milliGRAM(s) Oral daily  folic acid 1 milliGRAM(s) Oral daily  glucagon  Injectable 1 milliGRAM(s) IntraMuscular once  heparin   Injectable 5000 Unit(s) SubCutaneous every 8 hours  insulin lispro (ADMELOG) corrective regimen sliding scale   SubCutaneous three times a day before meals  latanoprost 0.005% Ophthalmic Solution 1 Drop(s) Both EYES at bedtime  losartan 25 milliGRAM(s) Oral daily  metoprolol succinate ER 50 milliGRAM(s) Oral daily  montelukast 10 milliGRAM(s) Oral daily  multivitamin 1 Tablet(s) Oral daily  pantoprazole    Tablet 40 milliGRAM(s) Oral before breakfast  polyethylene glycol 3350 17 Gram(s) Oral at bedtime  predniSONE   Tablet 5 milliGRAM(s) Oral daily  senna 2 Tablet(s) Oral at bedtime  warfarin 5 milliGRAM(s) Oral once    MEDICATIONS  (PRN):  aluminum hydroxide/magnesium hydroxide/simethicone Suspension 30 milliLiter(s) Oral four times a day PRN Indigestion  HYDROmorphone  Injectable 0.5 milliGRAM(s) SubCutaneous every 4 hours PRN Severe Pain (7 - 10)  ondansetron Injectable 8 milliGRAM(s) IV Push every 8 hours PRN Nausea and/or Vomiting  oxyCODONE    IR 5 milliGRAM(s) Oral every 4 hours PRN Mild Pain (1 - 3)  oxyCODONE    IR 10 milliGRAM(s) Oral every 4 hours PRN Moderate Pain (4 - 6)    Vital Signs Last 24 Hrs  T(C): 36.5 (24 Nov 2021 08:30), Max: 37.1 (23 Nov 2021 17:46)  T(F): 97.7 (24 Nov 2021 08:30), Max: 98.7 (23 Nov 2021 17:46)  HR: 60 (24 Nov 2021 08:30) (59 - 69)  BP: 106/57 (24 Nov 2021 08:30) (104/46 - 148/40)  BP(mean): 77 (23 Nov 2021 17:46) (77 - 77)  RR: 16 (24 Nov 2021 08:30) (11 - 18)  SpO2: 98% (24 Nov 2021 08:30) (92% - 100%)  I&O's Summary    24 Nov 2021 07:01  -  24 Nov 2021 14:29  --------------------------------------------------------  IN: 350 mL / OUT: 0 mL / NET: 350 mL  ________________________________  GENERAL APPEARANCE:  No acute distress  HEAD: normocephalic, atraumatic  NECK: supple, no jugular venous distention, no carotid bruit    HEART: Regular rate and rhythm, S1, S2 normal, 1/6 murmur    CHEST:  No anterior chest wall tenderness    LUNGS:  Clear to auscultation, without any wheezing, rhonchi or rales    ABDOMEN: soft, nontender, nondistended, with positive bowel sounds appreciated  EXTREMITIES: Mild lower extremity edema  NEURO: Alert and oriented x3  PSYC:  Normal affect  SKIN:  Dry  ________________________________   TELEMETRY: Not on telemetry    ECG: Ventricular paced rhythm    LABS:                ________________________________    RADIOLOGY & ADDITIONAL STUDIES: Chest x-ray clear  ________________________________    ASSESSMENT:  Coronary disease status post PCI in 2018 to the RCA  Chronic atrial fibrillation on anticoagulation  Sick sinus syndrome status post dual-chamber pacemaker in the right side, with a abandon pacemaker lead in the left chest wall  Moderate mitral valve regurgitation  Moderate pulmonary hypertension  Preop cardiovascular risk optimization      PLAN:  In summary, this is a 87y Female with a past medical history of coronary disease, sick sinus syndrome, chronic atrial fibrillation, who presents status post mechanical fall.    She is doing well postop.  We will give 5 mg of warfarin today.  Outpatient INR check.  Mild crackles at bases.  Discontinue IV fluids.  Resume oral Lasix today.  Hemoglobin decreased after surgery but has stabilized.  Monitor for now.  DVT PPx    __________________________________________________________________________  Thank you for allowing me to participate in the care of your patient. Please contact me should any questions arise.    PIOTR Daugherty DO, FACC

## 2021-11-24 NOTE — CDI QUERY NOTE - NSCDIOTHERTXTBX_GEN_ALL_CORE_HH
Clinical documentation indicates that this patient has a history of chronic  CHF.  Please include more specific documentation of the type and acuity of Heart Failure in your Progress Note and/or Discharge Summary.      A. Chronic ___ CHF_ diastolic _systolic  B  Acute on chronic  ___ CHF_ diastolic _systolic  C. Other (please specify)  D. Unable to determine      SUPPORTING DOCUMENTATION AND/OR CLINICAL EVIDENCE:    DC summary documents chronic CHF    ECHO RESULTS: previous EF 50%Echocardiogram 9/27/21--There is severe left atrial dilatation (LA volume index 49 ml/m²).  --There is mild left ventricular hypertrophy.--LV global wall motion is normal.  --LV ejection fraction (55 %) is normal.--There is a pacemaker in the right heart.  --The aortic valve is mildly calcified.--There is moderate mitral regurgitation. By the PISA method, the EROA is 30.06mm 2.  --There is moderate tricuspid regurgitation. RVSP 53mmHg.--The right atrial pressure is 6 - 10 mm Hg. There is moderate pulmonary hypertension.  --There is no pericardial effusion. IVC slightly dilated.    ________________________________  Review of systems: A 10 point review of system has been performed, and is negative except for what has been mentioned in the above history of present illness.       CHEST XRAY:< from: CT Chest No Cont (11.23.21 @ 15:01) >IMPRESSION: Mild congestive heart failure. No pneumonia    BNP:Serum Pro-Brain Natriuretic Peptide: 661 pg/mL *H* [0 - 450] (11-22-21)      MEDICATIONS: Furosemide

## 2021-11-24 NOTE — CDI QUERY NOTE - NSCDIOTHERTXTBX2_GEN_ALL_CORE_FT
Abnormal hemoglobin and hematocrit laboratory values    Clinical documentation indicates that this patient has  abnormal hemoglobin and hematocrit levels without an associated diagnosis.   In order to accurately capture this lab finding to the greatest degree of specificity reflecting the patient’s actual severity of illness please document the diagnosis, if known, associated with the below values & clinical evidence:     A. Blood loss anemia post procedural   B. Other type of anemia   C. Other (please specify)   D. Unable to determine     Supporting Documentation and/or Clinical Evidence:    Hemoglobin: 10.9 g/dL (11.24.21 @ 06:36)   Hemoglobin: 10.6 g/dL (11.24.21 @ 05:20)   Hemoglobin: 12.1 g/dL (11.23.21 @ 21:27)   Hemoglobin: 13.8 g/dL (11.23.21 @ 05:00)   Hemoglobin: 14.4 g/dL (11.22.21 @ 21:29)     Hematocrit: 34.6 % (11.24.21 @ 05:20)   Hematocrit: 40.0 % (11.23.21 @ 21:27)   Hematocrit: 45.1 % (11.23.21 @ 05:00)   Hematocrit: 46.9 % (11.22.21 @ 21:29)

## 2021-11-24 NOTE — PHYSICAL THERAPY INITIAL EVALUATION ADULT - MODALITIES TREATMENT COMMENTS
Pt OOB in hip chair, denies pain, +ICE L hip, SCD's B +, BP at /56 and in chair /43 denies dizziness, RN aware, pt resting comfortable, +alarm,

## 2021-11-24 NOTE — PHYSICAL THERAPY INITIAL EVALUATION ADULT - PRECAUTIONS/LIMITATIONS, REHAB EVAL
fall precautions/left hip precautions/isolation precautions fall precautions/left hip precautions/right hip precautions/isolation precautions

## 2021-11-24 NOTE — PROGRESS NOTE ADULT - SUBJECTIVE AND OBJECTIVE BOX
86 y/o F w/ PMH of HTN, a-fib, sleep apnea, dyslipidemia, h/o SSS S/P PPM, carotid artery disease, chronic CHF, psoriatic arthritis, CAD s/p PCI, h/o thrombocystis, p/w mechanical fall. Patient states that she tripped over a single step when she was going to another room, and fell on her L side. Denies hitting head. Denies LOC / dizziness  /LH. C/o L hip pain and R ankle pain. Denies CP, SOB, nausea, vomiting, abdominal pain, fever, chills, cough, runny nose, sore throat . Found to have left hip fx, now S/P Left hip Anuel    11/24: seen OOB to chair, had PT, only transferred from bed to chair, + left Hip pain , no sob, no chest pain, no abd pain , no cough, no dysuria , pain controlled, anthony po, no N/V    ROS: all ten systems were reviewed and are negative with pertinent positives documented in HPI      Vital Signs Last 24 Hrs  T(C): 36.5 (11-24-21 @ 08:30), Max: 37.1 (11-23-21 @ 17:46)  T(F): 97.7 (11-24-21 @ 08:30), Max: 98.7 (11-23-21 @ 17:46)  HR: 60 (11-24-21 @ 08:30) (59 - 69)  BP: 106/57 (11-24-21 @ 08:30) (104/46 - 148/40)  BP(mean): 77 (11-23-21 @ 17:46) (77 - 77)  RR: 16 (11-24-21 @ 08:30) (11 - 18)  SpO2: 98% (11-24-21 @ 08:30) (92% - 100%)    PHYSICAL EXAM:    GENERAL: Comfortable, no acute distress   HEAD:  Normocephalic, atraumatic  EYES: EOMI, PERRLA  HEENT: Moist mucous membranes  NECK: Supple, No JVD  NERVOUS SYSTEM:  Alert & Oriented X3, Motor Strength 5/5 B/L upper and lower extremities  CHEST/LUNG: Clear to auscultation bilaterally  HEART: Regular rate and rhythm  ABDOMEN: Soft, non tender, Nondistended, Bowel sounds present  GENITOURINARY: Voiding, no palpable bladder  EXTREMITIES:   No clubbing, cyanosis, or edema  MUSCULOSKELETAL- left hip dsg c/d/i  SKIN-no rash      labs:                        10.9   18.53 )-----------( 427      ( 24 Nov 2021 06:36 )             35.9       11-24    140  |  107  |  23  ----------------------------<  138<H>  4.6   |  28  |  1.11    Ca    8.9      24 Nov 2021 06:36    TPro  5.6<L>  /  Alb  3.0<L>  /  TBili  0.8  /  DBili  x   /  AST  38<H>  /  ALT  52  /  AlkPhos  67  11-24      PT/INR - ( 24 Nov 2021 06:36 )   PT: 14.9 sec;   INR: 1.29 ratio         PTT - ( 24 Nov 2021 06:36 )  PTT:30.0 sec    MEDICATIONS  (STANDING):  acetaminophen     Tablet .. 975 milliGRAM(s) Oral every 8 hours  allopurinol 100 milliGRAM(s) Oral two times a day  ascorbic acid 500 milliGRAM(s) Oral two times a day  aspirin  chewable 81 milliGRAM(s) Oral daily  atorvastatin 20 milliGRAM(s) Oral at bedtime  calcium carbonate 1250 mG  + Vitamin D (OsCal 500 + D) 1 Tablet(s) Oral three times a day  dextrose 40% Gel 15 Gram(s) Oral once  dextrose 5%. 1000 milliLiter(s) (50 mL/Hr) IV Continuous <Continuous>  dextrose 5%. 1000 milliLiter(s) (100 mL/Hr) IV Continuous <Continuous>  dextrose 50% Injectable 25 Gram(s) IV Push once  dextrose 50% Injectable 12.5 Gram(s) IV Push once  dextrose 50% Injectable 25 Gram(s) IV Push once  ferrous    sulfate 325 milliGRAM(s) Oral daily  folic acid 1 milliGRAM(s) Oral daily  glucagon  Injectable 1 milliGRAM(s) IntraMuscular once  heparin   Injectable 5000 Unit(s) SubCutaneous every 8 hours  insulin lispro (ADMELOG) corrective regimen sliding scale   SubCutaneous three times a day before meals  lactated ringers. 1000 milliLiter(s) (75 mL/Hr) IV Continuous <Continuous>  latanoprost 0.005% Ophthalmic Solution 1 Drop(s) Both EYES at bedtime  losartan 25 milliGRAM(s) Oral daily  metoprolol succinate ER 50 milliGRAM(s) Oral daily  montelukast 10 milliGRAM(s) Oral daily  multivitamin 1 Tablet(s) Oral daily  pantoprazole    Tablet 40 milliGRAM(s) Oral before breakfast  polyethylene glycol 3350 17 Gram(s) Oral at bedtime  predniSONE   Tablet 5 milliGRAM(s) Oral daily  senna 2 Tablet(s) Oral at bedtime  warfarin 5 milliGRAM(s) Oral once    MEDICATIONS  (PRN):  aluminum hydroxide/magnesium hydroxide/simethicone Suspension 30 milliLiter(s) Oral four times a day PRN Indigestion  HYDROmorphone  Injectable 0.5 milliGRAM(s) SubCutaneous every 4 hours PRN Severe Pain (7 - 10)  ondansetron Injectable 8 milliGRAM(s) IV Push every 8 hours PRN Nausea and/or Vomiting  oxyCODONE    IR 5 milliGRAM(s) Oral every 4 hours PRN Mild Pain (1 - 3)  oxyCODONE    IR 10 milliGRAM(s) Oral every 4 hours PRN Moderate Pain (4 - 6)      IMP: 86 y/o F with above pmh a/w:   #mechanical fall.   #L femoral neck fracture   #S/P left hip anuel   -Pain control  with Dilaudud/oxycodone    #acute blood loss anemia   d/t fx/surgery   no need fro transfusion presently   monitor HH    # Afib CV#7   rec'd Vit k 10 mg yesterday for IBR reversal preop   AC services following   Coumadin/heparin      *Age-indeterminate infarcts noted on CT w/ h/o a-fib    Neuro consult appreciated: cont ASA/statin   resume coumadin    *Multiple lytic foci of calvarium noted on CTH  -F/u outpatient w/ heme/onc and outpatient PET CT vs bone scan for further evalaution     *Leukocytosis   -Possibly reactive to fracture    afebrile  monitor wbc    *Lactic acidosis likely from dehydration   resolved     *Thyroid enlargement  -Outpatient endo referral + outpatient thyroid U/S     *Thrombocytosis / Transaminitis   -stable  -Thrombocytosis is being worked up by Dr. Arenas     *H/o HTN  borderline hypotensive  monitor  cont bb/losartan for now      # sleep apnea   monitor O2 sat    #dyslipidemia   cont statin    # carotid artery disease  on asa/ coumadin    #psoriatic arthritis   cont prednisone maintenance dose      #Vte prophylaxis   AC consult appreciate   Coumadin  BLe venodynes  INC mobility   88 y/o F w/ PMH of HTN, a-fib, sleep apnea, dyslipidemia, h/o SSS S/P PPM, carotid artery disease, chronic CHF, psoriatic arthritis, CAD s/p PCI, h/o thrombocystis, p/w mechanical fall. Patient states that she tripped over a single step when she was going to another room, and fell on her L side. Denies hitting head. Denies LOC / dizziness  /LH. C/o L hip pain and R ankle pain. Denies CP, SOB, nausea, vomiting, abdominal pain, fever, chills, cough, runny nose, sore throat . Found to have left hip fx, now S/P Left hip Nauel    11/24: seen OOB to chair, had PT, only transferred from bed to chair, + left Hip pain , no sob, no chest pain, no abd pain , no cough, no dysuria , pain controlled, anthony po, no N/V    ROS: all ten systems were reviewed and are negative with pertinent positives documented in HPI      Vital Signs Last 24 Hrs  T(C): 36.5 (11-24-21 @ 08:30), Max: 37.1 (11-23-21 @ 17:46)  T(F): 97.7 (11-24-21 @ 08:30), Max: 98.7 (11-23-21 @ 17:46)  HR: 60 (11-24-21 @ 08:30) (59 - 69)  BP: 106/57 (11-24-21 @ 08:30) (104/46 - 148/40)  BP(mean): 77 (11-23-21 @ 17:46) (77 - 77)  RR: 16 (11-24-21 @ 08:30) (11 - 18)  SpO2: 98% (11-24-21 @ 08:30) (92% - 100%)    PHYSICAL EXAM:    GENERAL: Comfortable, no acute distress   HEAD:  Normocephalic, atraumatic  EYES: EOMI, PERRLA  HEENT: Moist mucous membranes  NECK: Supple, No JVD  NERVOUS SYSTEM:  Alert & Oriented X3, Motor Strength 5/5 B/L upper and lower extremities  CHEST/LUNG: Clear to auscultation bilaterally  HEART: Regular rate and rhythm  ABDOMEN: Soft, non tender, Nondistended, Bowel sounds present  GENITOURINARY: Voiding, no palpable bladder  EXTREMITIES:   No clubbing, cyanosis, or edema  MUSCULOSKELETAL- left hip dsg c/d/i  SKIN-no rash      labs:                        10.9   18.53 )-----------( 427      ( 24 Nov 2021 06:36 )             35.9       11-24    140  |  107  |  23  ----------------------------<  138<H>  4.6   |  28  |  1.11    Ca    8.9      24 Nov 2021 06:36    TPro  5.6<L>  /  Alb  3.0<L>  /  TBili  0.8  /  DBili  x   /  AST  38<H>  /  ALT  52  /  AlkPhos  67  11-24      PT/INR - ( 24 Nov 2021 06:36 )   PT: 14.9 sec;   INR: 1.29 ratio         PTT - ( 24 Nov 2021 06:36 )  PTT:30.0 sec    MEDICATIONS  (STANDING):  acetaminophen     Tablet .. 975 milliGRAM(s) Oral every 8 hours  allopurinol 100 milliGRAM(s) Oral two times a day  ascorbic acid 500 milliGRAM(s) Oral two times a day  aspirin  chewable 81 milliGRAM(s) Oral daily  atorvastatin 20 milliGRAM(s) Oral at bedtime  calcium carbonate 1250 mG  + Vitamin D (OsCal 500 + D) 1 Tablet(s) Oral three times a day  dextrose 40% Gel 15 Gram(s) Oral once  dextrose 5%. 1000 milliLiter(s) (50 mL/Hr) IV Continuous <Continuous>  dextrose 5%. 1000 milliLiter(s) (100 mL/Hr) IV Continuous <Continuous>  dextrose 50% Injectable 25 Gram(s) IV Push once  dextrose 50% Injectable 12.5 Gram(s) IV Push once  dextrose 50% Injectable 25 Gram(s) IV Push once  ferrous    sulfate 325 milliGRAM(s) Oral daily  folic acid 1 milliGRAM(s) Oral daily  glucagon  Injectable 1 milliGRAM(s) IntraMuscular once  heparin   Injectable 5000 Unit(s) SubCutaneous every 8 hours  insulin lispro (ADMELOG) corrective regimen sliding scale   SubCutaneous three times a day before meals  lactated ringers. 1000 milliLiter(s) (75 mL/Hr) IV Continuous <Continuous>  latanoprost 0.005% Ophthalmic Solution 1 Drop(s) Both EYES at bedtime  losartan 25 milliGRAM(s) Oral daily  metoprolol succinate ER 50 milliGRAM(s) Oral daily  montelukast 10 milliGRAM(s) Oral daily  multivitamin 1 Tablet(s) Oral daily  pantoprazole    Tablet 40 milliGRAM(s) Oral before breakfast  polyethylene glycol 3350 17 Gram(s) Oral at bedtime  predniSONE   Tablet 5 milliGRAM(s) Oral daily  senna 2 Tablet(s) Oral at bedtime  warfarin 5 milliGRAM(s) Oral once    MEDICATIONS  (PRN):  aluminum hydroxide/magnesium hydroxide/simethicone Suspension 30 milliLiter(s) Oral four times a day PRN Indigestion  HYDROmorphone  Injectable 0.5 milliGRAM(s) SubCutaneous every 4 hours PRN Severe Pain (7 - 10)  ondansetron Injectable 8 milliGRAM(s) IV Push every 8 hours PRN Nausea and/or Vomiting  oxyCODONE    IR 5 milliGRAM(s) Oral every 4 hours PRN Mild Pain (1 - 3)  oxyCODONE    IR 10 milliGRAM(s) Oral every 4 hours PRN Moderate Pain (4 - 6)      IMP: 88 y/o F with above pmh a/w:   #mechanical fall.   #L femoral neck fracture   #S/P left hip anuel   -Pain control  with Dilaudud/oxycodone    #acute blood loss anemia   d/t fx/surgery   no need fro transfusion presently   monitor HH    # Afib CV#7   rec'd Vit k 10 mg yesterday for IBR reversal preop   AC services following   Coumadin/heparin    #chronic CHF   on Lasix which is currently being held postop    monitor and resume if s/s fluid overload      *Age-indeterminate infarcts noted on CT w/ h/o a-fib    Neuro consult appreciated: cont ASA/statin   resume coumadin    *Multiple lytic foci of calvarium noted on CTH  -F/u outpatient w/ heme/onc and outpatient PET CT vs bone scan for further evaluation     *Leukocytosis   -Possibly reactive to fracture    afebrile  monitor wbc    *Lactic acidosis likely from dehydration   resolved     *Thyroid enlargement  -Outpatient endo referral + outpatient thyroid U/S     *Thrombocytosis / Transaminitis   -stable  -Thrombocytosis is being worked up by Dr. Arenas     *H/o HTN  borderline hypotensive  monitor  cont bb/losartan for now      # sleep apnea   monitor O2 sat    #dyslipidemia   cont statin    # carotid artery disease  on asa/ coumadin    #psoriatic arthritis   cont prednisone maintenance dose      #Vte prophylaxis   AC consult appreciate   Coumadin  BLe venodynes  INC mobility   88 y/o F w/ PMH of HTN, a-fib, sleep apnea, dyslipidemia, h/o SSS S/P PPM, carotid artery disease, chronic CHF, psoriatic arthritis, CAD s/p PCI, h/o thrombocystis, p/w mechanical fall. Patient states that she tripped over a single step when she was going to another room, and fell on her L side. Denies hitting head. Denies LOC / dizziness  /LH. C/o L hip pain and R ankle pain. Denies CP, SOB, nausea, vomiting, abdominal pain, fever, chills, cough, runny nose, sore throat . Found to have left hip fx, now S/P Left hip Anuel    11/24: seen OOB to chair, had PT, only transferred from bed to chair, + left Hip pain , no sob, no chest pain, no abd pain , no cough, no dysuria , pain controlled, anthony po, no N/V    ROS: all ten systems were reviewed and are negative with pertinent positives documented in HPI      Vital Signs Last 24 Hrs  T(C): 36.5 (11-24-21 @ 08:30), Max: 37.1 (11-23-21 @ 17:46)  T(F): 97.7 (11-24-21 @ 08:30), Max: 98.7 (11-23-21 @ 17:46)  HR: 60 (11-24-21 @ 08:30) (59 - 69)  BP: 106/57 (11-24-21 @ 08:30) (104/46 - 148/40)  BP(mean): 77 (11-23-21 @ 17:46) (77 - 77)  RR: 16 (11-24-21 @ 08:30) (11 - 18)  SpO2: 98% (11-24-21 @ 08:30) (92% - 100%)    PHYSICAL EXAM:    GENERAL: Comfortable, no acute distress   HEAD:  Normocephalic, atraumatic  EYES: EOMI, PERRLA  HEENT: Moist mucous membranes  NECK: Supple, No JVD  NERVOUS SYSTEM:  Alert & Oriented X3, Motor Strength 5/5 B/L upper and lower extremities  CHEST/LUNG: Clear to auscultation bilaterally  HEART: Regular rate and rhythm  ABDOMEN: Soft, non tender, Nondistended, Bowel sounds present  GENITOURINARY: Voiding, no palpable bladder  EXTREMITIES:   No clubbing, cyanosis, or edema  MUSCULOSKELETAL- left hip dsg c/d/i  SKIN-no rash      labs:                        10.9   18.53 )-----------( 427      ( 24 Nov 2021 06:36 )             35.9       11-24    140  |  107  |  23  ----------------------------<  138<H>  4.6   |  28  |  1.11    Ca    8.9      24 Nov 2021 06:36    TPro  5.6<L>  /  Alb  3.0<L>  /  TBili  0.8  /  DBili  x   /  AST  38<H>  /  ALT  52  /  AlkPhos  67  11-24      PT/INR - ( 24 Nov 2021 06:36 )   PT: 14.9 sec;   INR: 1.29 ratio         PTT - ( 24 Nov 2021 06:36 )  PTT:30.0 sec    MEDICATIONS  (STANDING):  acetaminophen     Tablet .. 975 milliGRAM(s) Oral every 8 hours  allopurinol 100 milliGRAM(s) Oral two times a day  ascorbic acid 500 milliGRAM(s) Oral two times a day  aspirin  chewable 81 milliGRAM(s) Oral daily  atorvastatin 20 milliGRAM(s) Oral at bedtime  calcium carbonate 1250 mG  + Vitamin D (OsCal 500 + D) 1 Tablet(s) Oral three times a day  dextrose 40% Gel 15 Gram(s) Oral once  dextrose 5%. 1000 milliLiter(s) (50 mL/Hr) IV Continuous <Continuous>  dextrose 5%. 1000 milliLiter(s) (100 mL/Hr) IV Continuous <Continuous>  dextrose 50% Injectable 25 Gram(s) IV Push once  dextrose 50% Injectable 12.5 Gram(s) IV Push once  dextrose 50% Injectable 25 Gram(s) IV Push once  ferrous    sulfate 325 milliGRAM(s) Oral daily  folic acid 1 milliGRAM(s) Oral daily  glucagon  Injectable 1 milliGRAM(s) IntraMuscular once  heparin   Injectable 5000 Unit(s) SubCutaneous every 8 hours  insulin lispro (ADMELOG) corrective regimen sliding scale   SubCutaneous three times a day before meals  lactated ringers. 1000 milliLiter(s) (75 mL/Hr) IV Continuous <Continuous>  latanoprost 0.005% Ophthalmic Solution 1 Drop(s) Both EYES at bedtime  losartan 25 milliGRAM(s) Oral daily  metoprolol succinate ER 50 milliGRAM(s) Oral daily  montelukast 10 milliGRAM(s) Oral daily  multivitamin 1 Tablet(s) Oral daily  pantoprazole    Tablet 40 milliGRAM(s) Oral before breakfast  polyethylene glycol 3350 17 Gram(s) Oral at bedtime  predniSONE   Tablet 5 milliGRAM(s) Oral daily  senna 2 Tablet(s) Oral at bedtime  warfarin 5 milliGRAM(s) Oral once    MEDICATIONS  (PRN):  aluminum hydroxide/magnesium hydroxide/simethicone Suspension 30 milliLiter(s) Oral four times a day PRN Indigestion  HYDROmorphone  Injectable 0.5 milliGRAM(s) SubCutaneous every 4 hours PRN Severe Pain (7 - 10)  ondansetron Injectable 8 milliGRAM(s) IV Push every 8 hours PRN Nausea and/or Vomiting  oxyCODONE    IR 5 milliGRAM(s) Oral every 4 hours PRN Mild Pain (1 - 3)  oxyCODONE    IR 10 milliGRAM(s) Oral every 4 hours PRN Moderate Pain (4 - 6)      IMP: 88 y/o F with above pmh a/w:   #mechanical fall.   #L femoral neck fracture   #S/P left hip anuel   -Pain control  with Dilaudud/oxycodone    #acute blood loss anemia   d/t fx/surgery   no need fro transfusion presently   monitor HH    # Afib: chronic/ CV#7   rec'd Vit k 10 mg yesterday for IBR reversal preop   AC services following   Coumadin/heparin    #chronic CHF   recent Echo in Sept 2021: LVEF 55%   on Lasix which is currently being held postop    monitor and resume if s/s fluid overload      *Age-indeterminate infarcts noted on CT w/ h/o a-fib    Neuro consult appreciated: cont ASA/statin   resume coumadin    *Multiple lytic foci of calvarium noted on CTH  -F/u outpatient w/ heme/onc and outpatient PET CT vs bone scan for further evaluation     *Leukocytosis   -Possibly reactive to fracture    afebrile  monitor wbc    *Lactic acidosis likely from dehydration   resolved     *Thyroid enlargement  -Outpatient endo referral + outpatient thyroid U/S     *Thrombocytosis / Transaminitis   -stable  -Thrombocytosis is being worked up by Dr. Arenas     *H/o HTN  borderline hypotensive  monitor  cont bb/losartan for now      # sleep apnea   monitor O2 sat    #dyslipidemia   cont statin    # carotid artery disease  on asa/ coumadin      # CAD   h/o PCI: [facility] in [facility] in 2018   On asa    #psoriatic arthritis   cont prednisone maintenance dose      #Vte prophylaxis   AC consult appreciate   Coumadin  BLe venodynes  INC mobility

## 2021-11-24 NOTE — PHYSICAL THERAPY INITIAL EVALUATION ADULT - ACTIVE RANGE OF MOTION EXAMINATION, REHAB EVAL
except L hip NT/bilateral upper extremity Active ROM was WFL (within functional limits)/bilateral  lower extremity Active ROM was WFL (within functional limits)

## 2021-11-24 NOTE — PHYSICAL THERAPY INITIAL EVALUATION ADULT - GENERAL OBSERVATIONS, REHAB EVAL
Pt seen on 2N, NAD, denies pain this session, L hip dressing c/d/i, +ice on L hip, SCD's B+ , Gave CJR letter and KEERTHI Booklet, instructed in 3:3 KEERTHI precautions, ABD pillow+

## 2021-11-24 NOTE — PHYSICAL THERAPY INITIAL EVALUATION ADULT - PERTINENT HX OF CURRENT PROBLEM, REHAB EVAL
88 y/o F w/ PMH of HTN, a-fib, sleep apnea, dyslipidemia, PPM, carotid artery disease, chronic CHF, psoriatic arthritis, CAD s/p PCI, h/o thrombocystis, p/w mechanical fall. Patient states that she tripped over a single step 88 y/o F, p/w mechanical fall. Patient states that she tripped over a single step 86 y/o F, p/w mechanical fall. Patient states that she tripped over a single step. Found to have R hip fx. now s/p L FLACA.  PMH of HTN, a-fib, sleep apnea, dyslipidemia, PPM, carotid artery disease, chronic CHF, psoriatic arthritis, CAD s/p PCI, h/o thrombocystis

## 2021-11-24 NOTE — CONSULT NOTE ADULT - CONSULT REASON
Left Hip Fracture
Pre op CV Eval
DVT/PE prophylaxis, risk stratification and Anticoagulation Management
Age indeterminate infarcts noted on CT

## 2021-11-24 NOTE — CONSULT NOTE ADULT - ASSESSMENT
86 y/o F w/ PMH of HTN, a-fib on Coumadin , sleep apnea, dyslipidemia, PPM, carotid artery disease, chronic CHF, psoriatic arthritis, CAD s/p PCI, h/o thrombocystis, p/w mechanical fall sustained left femoral fracture now s/p Hemiarthroplasty. Consulted by    for VTE prophylaxis, risk stratification, and anticoagulation management. Patient is high VTE risk and moderate bleeding risk. Patient received Vit K preop,  no VTE history, walked few steps today on steroids  will resume Coumadin tonight, recommend  not to bridge with Lovenox rx dose     Plan  INR 1.29  Coumadin 5 mg PO daily  adjust dose per INR range (2-3)  Heparin 5,000 units SQ Q8hour d/c when INR>2.0  Daily PT/INR  Daily CBC/BMP  Enc ambulation  Venodynes  thanks for the consult will f/u

## 2021-11-24 NOTE — PROGRESS NOTE ADULT - SUBJECTIVE AND OBJECTIVE BOX
Patient tolerated the procedure well. Patient seen and examined at bedside.  No acute complaints at this time. Pain well controlled. Denies chest pain, shortness of breath, nausea or vomiting.     PE:  Vital Signs Last 24 Hrs  T(C): 36.4 (11-24-21 @ 04:51), Max: 37.1 (11-23-21 @ 06:08)  T(F): 97.5 (11-24-21 @ 04:51), Max: 98.8 (11-23-21 @ 06:08)  HR: 64 (11-24-21 @ 04:51) (59 - 82)  BP: 113/42 (11-24-21 @ 04:51) (104/46 - 148/40)  BP(mean): 77 (11-23-21 @ 17:46) (77 - 77)  RR: 18 (11-24-21 @ 04:51) (11 - 18)  SpO2: 92% (11-24-21 @ 04:51) (92% - 100%)    General: NAD, resting comfortably in bed  LLE:   Dressing in place C/D/I, Abd pillow in place  SCD in place bilaterally  No calf tenderness   TA/EHL/FHL/GSC+  SILT L2-S1  DP+                          12.1   17.99 )-----------( 413      ( 23 Nov 2021 21:27 )             40.0     23 Nov 2021 21:27    139    |  107    |  24     ----------------------------<  131    4.3     |  26     |  0.92     Ca    8.8        23 Nov 2021 21:27    TPro  7.1    /  Alb  3.8    /  TBili  1.1    /  DBili  0.3    /  AST  57     /  ALT  79     /  AlkPhos  99     23 Nov 2021 05:00    PT/INR - ( 23 Nov 2021 15:51 )   PT: 20.0 sec;   INR: 1.77 ratio         PTT - ( 23 Nov 2021 15:51 )  PTT:36.8 sec    A/P:  87y f s/p L Hip Anuel POD 1  -PT/OT -WBAT  -Pain Control  -DVT ppx pending AC recs  -Continue perioperative abx x 24 hours  -FU AM Labs  -Rest, ice, compress and elevate the extremity as we needed  -Incentive Spirometry  -Medical management appreciated  -Dispo planning    Ortho

## 2021-11-25 ENCOUNTER — TRANSCRIPTION ENCOUNTER (OUTPATIENT)
Age: 86
End: 2021-11-25

## 2021-11-25 VITALS
HEART RATE: 61 BPM | DIASTOLIC BLOOD PRESSURE: 45 MMHG | RESPIRATION RATE: 18 BRPM | OXYGEN SATURATION: 95 % | SYSTOLIC BLOOD PRESSURE: 114 MMHG | TEMPERATURE: 98 F

## 2021-11-25 PROBLEM — I50.9 HEART FAILURE, UNSPECIFIED: Chronic | Status: ACTIVE | Noted: 2021-11-22

## 2021-11-25 LAB
24R-OH-CALCIDIOL SERPL-MCNC: 10.8 NG/ML — LOW (ref 30–80)
ANION GAP SERPL CALC-SCNC: 4 MMOL/L — LOW (ref 5–17)
APTT BLD: 28.7 SEC — SIGNIFICANT CHANGE UP (ref 27.5–35.5)
BUN SERPL-MCNC: 33 MG/DL — HIGH (ref 7–23)
CALCIUM SERPL-MCNC: 8.5 MG/DL — SIGNIFICANT CHANGE UP (ref 8.5–10.1)
CHLORIDE SERPL-SCNC: 104 MMOL/L — SIGNIFICANT CHANGE UP (ref 96–108)
CO2 SERPL-SCNC: 27 MMOL/L — SIGNIFICANT CHANGE UP (ref 22–31)
CREAT SERPL-MCNC: 1.3 MG/DL — SIGNIFICANT CHANGE UP (ref 0.5–1.3)
GLUCOSE SERPL-MCNC: 108 MG/DL — HIGH (ref 70–99)
HCT VFR BLD CALC: 30.7 % — LOW (ref 34.5–45)
HGB BLD-MCNC: 9.5 G/DL — LOW (ref 11.5–15.5)
INR BLD: 1.25 RATIO — HIGH (ref 0.88–1.16)
MCHC RBC-ENTMCNC: 30.4 PG — SIGNIFICANT CHANGE UP (ref 27–34)
MCHC RBC-ENTMCNC: 30.9 GM/DL — LOW (ref 32–36)
MCV RBC AUTO: 98.1 FL — SIGNIFICANT CHANGE UP (ref 80–100)
PLATELET # BLD AUTO: 384 K/UL — SIGNIFICANT CHANGE UP (ref 150–400)
POTASSIUM SERPL-MCNC: 3.8 MMOL/L — SIGNIFICANT CHANGE UP (ref 3.5–5.3)
POTASSIUM SERPL-SCNC: 3.8 MMOL/L — SIGNIFICANT CHANGE UP (ref 3.5–5.3)
PROTHROM AB SERPL-ACNC: 14.3 SEC — HIGH (ref 10.6–13.6)
RBC # BLD: 3.13 M/UL — LOW (ref 3.8–5.2)
RBC # FLD: 21.6 % — HIGH (ref 10.3–14.5)
SODIUM SERPL-SCNC: 135 MMOL/L — SIGNIFICANT CHANGE UP (ref 135–145)
WBC # BLD: 17.52 K/UL — HIGH (ref 3.8–10.5)
WBC # FLD AUTO: 17.52 K/UL — HIGH (ref 3.8–10.5)

## 2021-11-25 PROCEDURE — 99239 HOSP IP/OBS DSCHRG MGMT >30: CPT

## 2021-11-25 PROCEDURE — 99231 SBSQ HOSP IP/OBS SF/LOW 25: CPT

## 2021-11-25 PROCEDURE — 93010 ELECTROCARDIOGRAM REPORT: CPT

## 2021-11-25 RX ORDER — HEPARIN SODIUM 5000 [USP'U]/ML
5000 INJECTION INTRAVENOUS; SUBCUTANEOUS
Qty: 0 | Refills: 0 | DISCHARGE
Start: 2021-11-25

## 2021-11-25 RX ORDER — ACETAMINOPHEN 500 MG
2 TABLET ORAL
Qty: 0 | Refills: 0 | DISCHARGE
Start: 2021-11-25

## 2021-11-25 RX ORDER — OXYCODONE HYDROCHLORIDE 5 MG/1
1 TABLET ORAL
Qty: 0 | Refills: 0 | DISCHARGE
Start: 2021-11-25

## 2021-11-25 RX ORDER — WARFARIN SODIUM 2.5 MG/1
5 TABLET ORAL DAILY
Refills: 0 | Status: DISCONTINUED | OUTPATIENT
Start: 2021-11-25 | End: 2021-11-25

## 2021-11-25 RX ORDER — PANTOPRAZOLE SODIUM 20 MG/1
1 TABLET, DELAYED RELEASE ORAL
Qty: 0 | Refills: 0 | DISCHARGE
Start: 2021-11-25

## 2021-11-25 RX ORDER — ASCORBIC ACID 60 MG
1 TABLET,CHEWABLE ORAL
Qty: 0 | Refills: 0 | DISCHARGE
Start: 2021-11-25

## 2021-11-25 RX ADMIN — Medication 81 MILLIGRAM(S): at 10:03

## 2021-11-25 RX ADMIN — Medication 1 TABLET(S): at 10:03

## 2021-11-25 RX ADMIN — MONTELUKAST 10 MILLIGRAM(S): 4 TABLET, CHEWABLE ORAL at 10:04

## 2021-11-25 RX ADMIN — PANTOPRAZOLE SODIUM 40 MILLIGRAM(S): 20 TABLET, DELAYED RELEASE ORAL at 10:03

## 2021-11-25 RX ADMIN — Medication 1 TABLET(S): at 05:02

## 2021-11-25 RX ADMIN — LOSARTAN POTASSIUM 25 MILLIGRAM(S): 100 TABLET, FILM COATED ORAL at 10:03

## 2021-11-25 RX ADMIN — HEPARIN SODIUM 5000 UNIT(S): 5000 INJECTION INTRAVENOUS; SUBCUTANEOUS at 05:02

## 2021-11-25 RX ADMIN — Medication 325 MILLIGRAM(S): at 10:03

## 2021-11-25 RX ADMIN — Medication 40 MILLIGRAM(S): at 10:03

## 2021-11-25 RX ADMIN — Medication 1 MILLIGRAM(S): at 10:03

## 2021-11-25 RX ADMIN — Medication 500 MILLIGRAM(S): at 10:03

## 2021-11-25 RX ADMIN — Medication 975 MILLIGRAM(S): at 05:09

## 2021-11-25 RX ADMIN — Medication 975 MILLIGRAM(S): at 05:02

## 2021-11-25 RX ADMIN — Medication 100 MILLIGRAM(S): at 10:04

## 2021-11-25 RX ADMIN — Medication 5 MILLIGRAM(S): at 10:04

## 2021-11-25 RX ADMIN — Medication 50 MILLIGRAM(S): at 10:04

## 2021-11-25 NOTE — PROGRESS NOTE ADULT - SUBJECTIVE AND OBJECTIVE BOX
86 y/o F w/ PMH of HTN, a-fib, sleep apnea, dyslipidemia, h/o SSS S/P PPM, carotid artery disease, chronic diastolic CHF, psoriatic arthritis, CAD s/p PCI, h/o thrombocystis, p/w mechanical fall. Patient states that she tripped over a single step when she was going to another room, and fell on her L side. Denies hitting head. Denies LOC / dizziness  /LH. C/o L hip pain and R ankle pain. Denies CP, SOB, nausea, vomiting, abdominal pain, fever, chills, cough, runny nose, sore throat . Found to have left hip fx, now S/P Left hip Anuel    11/24: seen OOB to chair, had PT, only transferred from bed to chair, + left Hip pain , no sob, no chest pain, no abd pain , no cough, no dysuria , pain controlled, anthony po, no N/V  11/25 doing good, going to rehab today    ROS: all ten systems were reviewed and are negative with pertinent positives documented in HPI    Vital Signs Last 24 Hrs  T(C): 36.7 (25 Nov 2021 09:24), Max: 36.7 (25 Nov 2021 09:24)  T(F): 98.1 (25 Nov 2021 09:24), Max: 98.1 (25 Nov 2021 09:24)  HR: 61 (25 Nov 2021 09:24) (59 - 61)  BP: 114/45 (25 Nov 2021 09:24) (98/45 - 114/45)  BP(mean): --  RR: 18 (25 Nov 2021 09:24) (16 - 18)  SpO2: 95% (25 Nov 2021 09:24) (95% - 99%)    PHYSICAL EXAM:  GENERAL: Comfortable, no acute distress   HEAD:  Normocephalic, atraumatic  EYES: EOMI, PERRLA  HEENT: Moist mucous membranes  NECK: Supple, No JVD  NERVOUS SYSTEM:  Alert & Oriented X3, Motor Strength 5/5 B/L upper and lower extremities  CHEST/LUNG: Clear to auscultation bilaterally  HEART: Regular rate and rhythm  ABDOMEN: Soft, non tender, Nondistended, Bowel sounds present  GENITOURINARY: Voiding, no palpable bladder  EXTREMITIES:   No clubbing, cyanosis, or edema  MUSCULOSKELETAL- left hip dsg c/d/i  SKIN-no rash    labs:                        9.5    17.52 )-----------( 384      ( 25 Nov 2021 04:29 )             30.7     25 Nov 2021 04:29    135    |  104    |  33     ----------------------------<  108    3.8     |  27     |  1.30     Ca    8.5        25 Nov 2021 04:29    TPro  x      /  Alb  2.6    /  TBili  x      /  DBili  x      /  AST  x      /  ALT  x      /  AlkPhos  x      25 Nov 2021 04:29    LIVER FUNCTIONS - ( 25 Nov 2021 04:29 )  Alb: 2.6 g/dL / Pro: x     / ALK PHOS: x     / ALT: x     / AST: x     / GGT: x           PT/INR - ( 25 Nov 2021 04:29 )   PT: 14.3 sec;   INR: 1.25 ratio      PTT - ( 25 Nov 2021 04:29 )  PTT:28.7 sec  CAPILLARY BLOOD GLUCOSE  POCT Blood Glucose.: 120 mg/dL (25 Nov 2021 07:45)  POCT Blood Glucose.: 126 mg/dL (24 Nov 2021 21:58)  POCT Blood Glucose.: 140 mg/dL (24 Nov 2021 16:53)  POCT Blood Glucose.: 143 mg/dL (24 Nov 2021 11:56)    MEDICATIONS  (STANDING):  acetaminophen     Tablet .. 975 milliGRAM(s) Oral every 8 hours  allopurinol 100 milliGRAM(s) Oral two times a day  ascorbic acid 500 milliGRAM(s) Oral two times a day  aspirin  chewable 81 milliGRAM(s) Oral daily  atorvastatin 20 milliGRAM(s) Oral at bedtime  calcium carbonate 1250 mG  + Vitamin D (OsCal 500 + D) 1 Tablet(s) Oral three times a day  dextrose 40% Gel 15 Gram(s) Oral once  dextrose 5%. 1000 milliLiter(s) (50 mL/Hr) IV Continuous <Continuous>  dextrose 5%. 1000 milliLiter(s) (100 mL/Hr) IV Continuous <Continuous>  dextrose 50% Injectable 25 Gram(s) IV Push once  dextrose 50% Injectable 12.5 Gram(s) IV Push once  dextrose 50% Injectable 25 Gram(s) IV Push once  ferrous    sulfate 325 milliGRAM(s) Oral daily  folic acid 1 milliGRAM(s) Oral daily  furosemide    Tablet 40 milliGRAM(s) Oral daily  glucagon  Injectable 1 milliGRAM(s) IntraMuscular once  heparin   Injectable 5000 Unit(s) SubCutaneous every 8 hours  insulin lispro (ADMELOG) corrective regimen sliding scale   SubCutaneous three times a day before meals  latanoprost 0.005% Ophthalmic Solution 1 Drop(s) Both EYES at bedtime  losartan 25 milliGRAM(s) Oral daily  metoprolol succinate ER 50 milliGRAM(s) Oral daily  montelukast 10 milliGRAM(s) Oral daily  multivitamin 1 Tablet(s) Oral daily  pantoprazole    Tablet 40 milliGRAM(s) Oral before breakfast  polyethylene glycol 3350 17 Gram(s) Oral at bedtime  predniSONE   Tablet 5 milliGRAM(s) Oral daily  senna 2 Tablet(s) Oral at bedtime  warfarin 5 milliGRAM(s) Oral daily    MEDICATIONS  (PRN):  aluminum hydroxide/magnesium hydroxide/simethicone Suspension 30 milliLiter(s) Oral four times a day PRN Indigestion  HYDROmorphone  Injectable 0.5 milliGRAM(s) SubCutaneous every 4 hours PRN Severe Pain (7 - 10)  ondansetron Injectable 8 milliGRAM(s) IV Push every 8 hours PRN Nausea and/or Vomiting  oxyCODONE    IR 5 milliGRAM(s) Oral every 4 hours PRN Mild Pain (1 - 3)  oxyCODONE    IR 10 milliGRAM(s) Oral every 4 hours PRN Moderate Pain (4 - 6)      IMP: 86 y/o F with above pmh a/w:   #L femoral neck fracture 2/2 mechanical fall  #S/P left hip anuel   Ortho following  PT as tolerated  AC by coumadin  Bowel regimen  Incentive spirometry    #acute blood loss anemia  d/t fx/surgery  HH stable for discharge    #Afib: chronic/ CV#7  Back on Coumadin    chronic diastolic CHF  recent Echo in Sept 2021: LVEF 55%  on Lasix which is currently being held postop  Compensated    *Age-indeterminate infarcts noted on CT w/ h/o a-fib    Neuro consult appreciated: cont ASA/statin   back on coumadin    *Multiple lytic foci of calvarium noted on CTH  -F/u outpatient w/ heme/onc and outpatient PET CT vs bone scan for further evaluation     *Leukocytosis   -Possibly reactive to fracture    afebrile  monitor wbc    *Lactic acidosis likely from dehydration   resolved     *Thyroid enlargement  -Outpatient endo referral + outpatient thyroid U/S     *Thrombocytosis / Transaminitis   -stable  -Thrombocytosis is being worked up by Dr. Arenas     *H/o HTN  borderline hypotensive  monitor  cont bb/losartan for now    # sleep apnea   monitor O2 sat    #dyslipidemia   cont statin    # carotid artery disease  on asa/ coumadin    # CAD   h/o PCI: STEFANIE in [facility] in 2018   On asa    #psoriatic arthritis   cont prednisone maintenance dose    #Vte prophylaxis  Coumadin    #Dispo- rehab today. DC time 45 mins

## 2021-11-25 NOTE — PROGRESS NOTE ADULT - SUBJECTIVE AND OBJECTIVE BOX
Orthopedics      Patient seen and examined at bedside. Feeling well. Pain controlled. No n/v. No acute events overnight.    Vital Signs Last 24 Hrs  T(C): 36.6 (11-25-21 @ 00:21), Max: 36.6 (11-25-21 @ 00:21)  T(F): 97.9 (11-25-21 @ 00:21), Max: 97.9 (11-25-21 @ 00:21)  HR: 60 (11-25-21 @ 00:21) (59 - 60)  BP: 98/45 (11-25-21 @ 00:21) (98/45 - 110/45)  BP(mean): --  RR: 16 (11-25-21 @ 00:21) (16 - 16)  SpO2: 95% (11-25-21 @ 00:21) (95% - 99%)                        9.5    17.52 )-----------( 384      ( 25 Nov 2021 04:29 )             30.7     25 Nov 2021 04:29    135    |  104    |  33     ----------------------------<  108    3.8     |  27     |  1.30     Ca    8.5        25 Nov 2021 04:29    TPro  x      /  Alb  2.6    /  TBili  x      /  DBili  x      /  AST  x      /  ALT  x      /  AlkPhos  x      25 Nov 2021 04:29    PT/INR - ( 25 Nov 2021 04:29 )   PT: 14.3 sec;   INR: 1.25 ratio         PTT - ( 25 Nov 2021 04:29 )  PTT:28.7 sec    Exam:  Gen: NAD, resting comfortably  LLE:  Dressing c/d/i  Abduction pillow  NTTP calves b/l  +EHL/FHL/TA/GS  SILT L2-S1  Compartments soft and compressible  2+ Pulses palpable        A/P:  87y f s/p L Hip Anuel POD 2  -PT/OT -WBAT  -Pain Control  -DVT ppx per AC recs  -FU AM Labs  -Rest, ice, compress and elevate the extremity as we needed  -Incentive Spirometry  -Medical management appreciated  -Dispo planning: OLIVIA

## 2021-11-25 NOTE — PROGRESS NOTE ADULT - ASSESSMENT
88 y/o F w/ PMH of HTN, a-fib on Coumadin , sleep apnea, dyslipidemia, PPM, carotid artery disease, chronic CHF, psoriatic arthritis, CAD s/p PCI, h/o thrombocystis, p/w mechanical fall sustained left femoral fracture now s/p Hemiarthroplasty. Consulted by    for VTE prophylaxis, risk stratification, and anticoagulation management. Patient is high VTE risk and moderate bleeding risk. Patient received Vit K preop,  no VTE history, ambulated with PT,  on steroids  , recommend  not to bridge with Lovenox rx dose     Plan  INR 1.25  Coumadin 5 mg PO daily  adjust dose per INR range (2-3)  Heparin 5,000 units SQ Q8hour d/c when INR>2.0  Daily PT/INR  Daily CBC/BMP  Enc ambulation  Venodynes   will f/u

## 2021-11-25 NOTE — PROGRESS NOTE ADULT - PROVIDER SPECIALTY LIST ADULT
Hospitalist
Cardiology
Hospitalist
Orthopedics
Orthopedics
Hospitalist
Orthopedics
Orthopedics
Anticoag Management

## 2021-11-25 NOTE — PROGRESS NOTE ADULT - REASON FOR ADMISSION
mechanical fall

## 2021-11-25 NOTE — DISCHARGE NOTE NURSING/CASE MANAGEMENT/SOCIAL WORK - PATIENT PORTAL LINK FT
You can access the FollowMyHealth Patient Portal offered by [facility] by registering at the following website: [email or web address]. By joining [facility]’s FollowMyHealth portal, you will also be able to view your health information using other applications (apps) compatible with our system.

## 2021-11-25 NOTE — PROGRESS NOTE ADULT - SUBJECTIVE AND OBJECTIVE BOX
HPI:  88 y/o F w/ PMH of HTN, a-fib, sleep apnea, dyslipidemia, PPM, carotid artery disease, chronic CHF, psoriatic arthritis, CAD s/p PCI, h/o thrombocystis, p/w mechanical fall. Patient states that she tripped over a single step when she was going to another room, and fell on her L side. Denies hitting head. Denies LOC / dizziness  /LH. C/o L hip pain and R ankle pain. Denies CP, SOB, nausea, vomiting, abdominal pain, fever, chills, cough, runny nose, sore throat     PSH: PPM placement, PCI    Social Hx: Denies x 3    Family hx: Parents - CAD, sister - breast cancer  (2021 03:54)      Patient is a 87y old  Female who presents with a chief complaint of mechanical fall (2021 05:31)      Consulted by    for VTE prophylaxis, risk stratification, and anticoagulation management.    PAST MEDICAL & SURGICAL HISTORY:  HTN (hypertension)    Afib    Sleep apnea    Hyperlipidemia    Pacemaker    SSS (sick sinus syndrome)    Carotid artery disease    Chronic CHF    Pacemaker    Interval History  21:Patient seen at bedside Discussed anticoagulation , patient is on Coumadin due to Afib history , denies any h/o VTE, INR today is 1.29 patient received Vit K preop, Patient is OOB to chair, walked few steps will resume Coumadin with Heparin SQ, Verbalized understanding and is in agreement with treatment plan.  :Patient seen at bedside, denies any pain, ambulated with PT yesterday, INR 1.25, as fas as patient is ambulating will continue on Coumadin /heparin         CrCl:43  EBL:200ml  BMI:30.1    Caprini VTE Risk Score:CAPRINI SCORE  AGE RELATED RISK FACTORS                                                       MOBILITY RELATED FACTORS  [ ] Age 41-60 years                                            (1 Point)                  [ ] Bed rest /restricted mobility                             (1 Point)  [ ] Age: 61-74 years                                           (2 Points)                [ ] Plaster cast                                                   (2 Points)  [x ] Age= 75 years                                              (3 Points)                 [ ] Bed bound for more than 72 hours                   (2 Points)    DISEASE RELATED RISK FACTORS                                               GENDER SPECIFIC FACTORS  [ ] Edema in the lower extremities                       (1 Point)           [ ] Pregnancy                                                            (1 Point)  [ ] Varicose veins                                               (1 Point)                  [ ] Post-partum < 6 weeks                                      (1 Point)             [x ] BMI > 25 Kg/m2                                            (1 Point)                  [ ] Hormonal therapy or oral contraception       (1 Point)                 [ ] Sepsis (in the previous month)                        (1 Point)             [ ] History of pregnancy complications                (1Point)  [ ] Pneumonia or serious lung disease                                             [ ] Unexplained or recurrent  (=/>3), premature                                 (In the previous month)                               (1 Point)                birth with toxemia or growth-restricted infant (1 Point)  [ ] Abnormal pulmonary function test            (1 Point)                                   SURGERY RELATED RISK FACTORS  [ ] Acute myocardial infarction                       (1 Point)                  [ ]  Section                                         (1 Point)  [ ] Congestive heart failure (in the previous month) (1 Point)   [ ] Minor surgery   lasting <45 minutes       (1 Point)   [ ] Inflammatory bowel disease                             (1 Point)          [ ] Arthroscopic surgery                                  (2 Points)  [ ] Central venous access                                    (2 Points)            [ ] General surgery lasting >45 minutes      (2 Points)       [ ] Stroke (in the previous month)                  (5 Points)            [ ] Elective major lower extremity arthroplasty (5 Points)                                   [  ] Malignancy (present or past include skin melanoma                                          but exclude  basal skin cell)    (2 points)                                      TRAUMA RELATED RISK FACTORS                HEMATOLOGY RELATED FACTORS                                  [ x] Fracture of the hip, pelvis, or leg                       (5 Points)  [ ] Prior episodes of VTE                                     (3 Points)          [ ] Acute spinal cord injury (in the previous month)  (5 Points)  [ ] Positive family history for VTE                         (3 Points)       [ ] Paralysis (less than 1 month)                          (5 Points)  [ ] Prothrombin 26839 A                                      (3 Points)         [ ] Multiple Trauma (within 1month)                 (5Points)                                                                                                                                                                [ ] Factor V Leiden                                          (3 Points)                                OTHER RISK FACTORS                          [ ] Lupus anticoagulants                                     (3 Points)                       [ ] BMI > 40                          (1 Point)                                                         [ ] Anticardiolipin antibodies                                (3 Points)                   [ ] Smoking                              (1Point)                                                [ ] High homocysteine in the blood                      (3 Points)                [  ] Diabetes requiring insulin (1point)                         [ ] Other congenital or acquired thrombophilia       (3 Points)          [  ] Chemotherapy                   (1 Point)  [ ] Heparin induced thrombocytopenia                  (3 Points)             [  ] Blood Transfusion                (1 point)                                                                                                             Total Score [    9      ]                                                                                                                                                                                                                                                                                                                                                                                                                                             [id number] Score: 6    IMPROVE Bleeding Risk Score:4.5      Falls Risk:   High ( x )  Mod (  )  Low (  )      FAMILY HISTORY:  CAD (coronary artery disease) (Father, Mother)    Family history of breast cancer in sister (Sibling)      Denies any personal or familial history of clotting or bleeding disorders.    Allergies    Eliquis (Unknown)  penicillin (Rash)    Intolerances        REVIEW OF SYSTEMS    (  )Fever	     (  )Constipation	(  )SOB				(  )Headache	(  )Dysuria  (  )Chills	     (  )Melena	(  )Dyspnea present on exertion	                    (  )Dizziness                    (  )Polyuria  (  )Nausea	     (  )Hematochezia	(  )Cough			                    (  )Syncope   	(  )Hematuria  (  )Vomiting    (  )Chest Pain	(  )Wheezing			(  )Weakness  (  )Diarrhea     (  )Palpitations	(  )Anorexia			( x )joint pain    All  other review of systems negative: Yes    Vital Signs Last 24 Hrs  T(C): 36.7 (2021 09:24), Max: 36.7 (2021 09:24)  T(F): 98.1 (2021 09:24), Max: 98.1 (2021 09:24)  HR: 61 (2021 09:24) (59 - 61)  BP: 114/45 (2021 09:24) (98/45 - 114/45)  BP(mean): --  RR: 18 (2021 09:24) (16 - 18)  SpO2: 95% (2021 09:24) (95% - 99%)  PHYSICAL EXAM:    Constitutional: Appears Well    Neurological: A& O x 3    Skin: Warm    Respiratory and Thorax: normal effort; Breath sounds: normal; No rales/wheezing/rhonchi  	  Cardiovascular: S1, S2, regular, NMBR	    Gastrointestinal: BS + x 4Q, nontender	    Genitourinary:  Bladder nondistended, nontender    Musculoskeletal:   General Right:   no muscle/joint tenderness,   normal tone, no joint swelling,   ROM: full	    General Left:   + muscle/joint tenderness,   normal tone, no joint swelling,   ROM: limited    Hip:     Left: Dressing CDI;         Lower extrems:   Right: no calf tenderness              negative denton's sign               + pedal pulses    Left:   no calf tenderness              negative denton's sign               + pedal pulses                           9.5    17.52 )-----------( 384      ( 2021 04:29 )             30.7           135  |  104  |  33<H>  ----------------------------<  108<H>  3.8   |  27  |  1.30    Ca    8.5      2021 04:29    TPro  x   /  Alb  2.6<L>  /  TBili  x   /  DBili  x   /  AST  x   /  ALT  x   /  AlkPhos  x   11-25      PT/INR - ( 2021 04:29 )   PT: 14.3 sec;   INR: 1.25 ratio         PTT - ( 2021 04:29 )  PTT:28.7 sec                     10.9   18.53 )-----------( 427      ( 2021 06:36 )             35.9       11-24    140  |  107  |  23  ----------------------------<  138<H>  4.6   |  28  |  1.11    Ca    8.9      2021 06:36    TPro  5.6<L>  /  Alb  3.0<L>  /  TBili  0.8  /  DBili  x   /  AST  38<H>  /  ALT  52  /  AlkPhos  67  1124    PT/INR - ( 2021 04:29 )   PT: 14.3 sec;   INR: 1.25 ratio    PT/INR - ( 2021 06:36 )   PT: 14.9 sec;   INR: 1.29 ratio         PTT - ( 2021 06:36 )  PTT:30.0 sec				    MEDICATIONS  (STANDING):  acetaminophen     Tablet .. 975 milliGRAM(s) Oral every 8 hours  allopurinol 100 milliGRAM(s) Oral two times a day  ascorbic acid 500 milliGRAM(s) Oral two times a day  aspirin  chewable 81 milliGRAM(s) Oral daily  atorvastatin 20 milliGRAM(s) Oral at bedtime  calcium carbonate 1250 mG  + Vitamin D (OsCal 500 + D) 1 Tablet(s) Oral three times a day  dextrose 40% Gel 15 Gram(s) Oral once  dextrose 5%. 1000 milliLiter(s) IV Continuous <Continuous>  dextrose 5%. 1000 milliLiter(s) IV Continuous <Continuous>  dextrose 50% Injectable 25 Gram(s) IV Push once  dextrose 50% Injectable 12.5 Gram(s) IV Push once  dextrose 50% Injectable 25 Gram(s) IV Push once  ferrous    sulfate 325 milliGRAM(s) Oral daily  folic acid 1 milliGRAM(s) Oral daily  glucagon  Injectable 1 milliGRAM(s) IntraMuscular once  heparin   Injectable 5000 Unit(s) SubCutaneous every 8 hours  insulin lispro (ADMELOG) corrective regimen sliding scale   SubCutaneous three times a day before meals  lactated ringers. 1000 milliLiter(s) IV Continuous <Continuous>  latanoprost 0.005% Ophthalmic Solution 1 Drop(s) Both EYES at bedtime  losartan 25 milliGRAM(s) Oral daily  metoprolol succinate ER 50 milliGRAM(s) Oral daily  montelukast 10 milliGRAM(s) Oral daily  multivitamin 1 Tablet(s) Oral daily  pantoprazole    Tablet 40 milliGRAM(s) Oral before breakfast  polyethylene glycol 3350 17 Gram(s) Oral at bedtime  predniSONE   Tablet 5 milliGRAM(s) Oral daily  senna 2 Tablet(s) Oral at bedtime  warfarin 5 milliGRAM(s) Oral once          DVT Prophylaxis:  LMWH                   (  )  Heparin SQ           ( x )  Coumadin             ( x )  Xarelto                  (  )  Eliquis                   (  )  Venodynes           ( x )  Ambulation          ( x )  UFH                       (  )  Contraindicated  (  )  EC ASPIRIN       (  )

## 2021-12-02 DIAGNOSIS — L40.50 ARTHROPATHIC PSORIASIS, UNSPECIFIED: ICD-10-CM

## 2021-12-02 DIAGNOSIS — I50.32 CHRONIC DIASTOLIC (CONGESTIVE) HEART FAILURE: ICD-10-CM

## 2021-12-02 DIAGNOSIS — Z79.52 LONG TERM (CURRENT) USE OF SYSTEMIC STEROIDS: ICD-10-CM

## 2021-12-02 DIAGNOSIS — Z88.8 ALLERGY STATUS TO OTHER DRUGS, MEDICAMENTS AND BIOLOGICAL SUBSTANCES STATUS: ICD-10-CM

## 2021-12-02 DIAGNOSIS — E87.2 ACIDOSIS: ICD-10-CM

## 2021-12-02 DIAGNOSIS — D62 ACUTE POSTHEMORRHAGIC ANEMIA: ICD-10-CM

## 2021-12-02 DIAGNOSIS — I08.1 RHEUMATIC DISORDERS OF BOTH MITRAL AND TRICUSPID VALVES: ICD-10-CM

## 2021-12-02 DIAGNOSIS — Z79.01 LONG TERM (CURRENT) USE OF ANTICOAGULANTS: ICD-10-CM

## 2021-12-02 DIAGNOSIS — E78.5 HYPERLIPIDEMIA, UNSPECIFIED: ICD-10-CM

## 2021-12-02 DIAGNOSIS — G47.33 OBSTRUCTIVE SLEEP APNEA (ADULT) (PEDIATRIC): ICD-10-CM

## 2021-12-02 DIAGNOSIS — Z79.82 LONG TERM (CURRENT) USE OF ASPIRIN: ICD-10-CM

## 2021-12-02 DIAGNOSIS — E86.0 DEHYDRATION: ICD-10-CM
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
## 2021-12-02 DIAGNOSIS — S72.002A FRACTURE OF UNSPECIFIED PART OF NECK OF LEFT FEMUR, INITIAL ENCOUNTER FOR CLOSED FRACTURE: ICD-10-CM

## 2021-12-02 DIAGNOSIS — I27.20 PULMONARY HYPERTENSION, UNSPECIFIED: ICD-10-CM

## 2021-12-02 DIAGNOSIS — D72.829 ELEVATED WHITE BLOOD CELL COUNT, UNSPECIFIED: ICD-10-CM

## 2021-12-02 DIAGNOSIS — I49.5 SICK SINUS SYNDROME: ICD-10-CM

## 2021-12-02 DIAGNOSIS — E04.9 NONTOXIC GOITER, UNSPECIFIED: ICD-10-CM

## 2021-12-02 DIAGNOSIS — I48.20 CHRONIC ATRIAL FIBRILLATION, UNSPECIFIED: ICD-10-CM

## 2021-12-02 DIAGNOSIS — Z95.0 PRESENCE OF CARDIAC PACEMAKER: ICD-10-CM

## 2021-12-02 DIAGNOSIS — M17.0 BILATERAL PRIMARY OSTEOARTHRITIS OF KNEE: ICD-10-CM

## 2021-12-02 DIAGNOSIS — R74.01 ELEVATION OF LEVELS OF LIVER TRANSAMINASE LEVELS: ICD-10-CM

## 2021-12-02 DIAGNOSIS — I73.9 PERIPHERAL VASCULAR DISEASE, UNSPECIFIED: ICD-10-CM

## 2021-12-02 DIAGNOSIS — Z86.73 PERSONAL HISTORY OF TRANSIENT ISCHEMIC ATTACK (TIA), AND CEREBRAL INFARCTION WITHOUT RESIDUAL DEFICITS: ICD-10-CM

## 2021-12-02 DIAGNOSIS — M47.9 SPONDYLOSIS, UNSPECIFIED: ICD-10-CM

## 2021-12-02 DIAGNOSIS — I25.10 ATHEROSCLEROTIC HEART DISEASE OF NATIVE CORONARY ARTERY WITHOUT ANGINA PECTORIS: ICD-10-CM

## 2021-12-02 DIAGNOSIS — R09.02 HYPOXEMIA: ICD-10-CM
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
## 2021-12-02 DIAGNOSIS — Z95.5 PRESENCE OF CORONARY ANGIOPLASTY IMPLANT AND GRAFT: ICD-10-CM

## 2021-12-02 DIAGNOSIS — J45.909 UNSPECIFIED ASTHMA, UNCOMPLICATED: ICD-10-CM

## 2021-12-02 DIAGNOSIS — Z79.899 OTHER LONG TERM (CURRENT) DRUG THERAPY: ICD-10-CM

## 2021-12-02 DIAGNOSIS — I11.0 HYPERTENSIVE HEART DISEASE WITH HEART FAILURE: ICD-10-CM

## 2021-12-02 DIAGNOSIS — D75.839 THROMBOCYTOSIS, UNSPECIFIED: ICD-10-CM

## 2021-12-02 DIAGNOSIS — Z88.0 ALLERGY STATUS TO PENICILLIN: ICD-10-CM

## 2021-12-02 DIAGNOSIS — M89.8X8 OTHER SPECIFIED DISORDERS OF BONE, OTHER SITE: ICD-10-CM

## 2022-01-18 NOTE — ED ADULT NURSE NOTE - OBJECTIVE STATEMENT
PROCEDURES:  Cholecystectomy, laparoscopic, with intraoperative cholangiogram 18-Jan-2022 18:32:22  Katherine Sterling  
Pt BIBEMS due to right leg and shoulder weakness over the last 3 days. Pt reports this morning the weakness ans numbness increased. Decrease in ambulatory status usually ambulatory with walker.  Pt has history of arthritidis ans Afib with a pacemaker. Pt is a/o x4. No signs of acute distress.

## 2022-02-07 ENCOUNTER — APPOINTMENT (OUTPATIENT)
Dept: ELECTROPHYSIOLOGY | Facility: CLINIC | Age: 87
End: 2022-02-07
Payer: MEDICARE

## 2022-02-07 VITALS
DIASTOLIC BLOOD PRESSURE: 62 MMHG | OXYGEN SATURATION: 100 % | SYSTOLIC BLOOD PRESSURE: 162 MMHG | HEIGHT: 63 IN | BODY MASS INDEX: 27.46 KG/M2 | WEIGHT: 155 LBS | RESPIRATION RATE: 16 BRPM | HEART RATE: 77 BPM

## 2022-02-07 PROCEDURE — 93280 PM DEVICE PROGR EVAL DUAL: CPT

## 2022-02-07 RX ORDER — MONTELUKAST SODIUM 10 MG/1
10 TABLET, FILM COATED ORAL DAILY
Refills: 0 | Status: DISCONTINUED | COMMUNITY
End: 2022-02-07

## 2022-05-06 ENCOUNTER — APPOINTMENT (OUTPATIENT)
Dept: VASCULAR SURGERY | Facility: CLINIC | Age: 87
End: 2022-05-06
Payer: MEDICARE

## 2022-05-06 VITALS — OXYGEN SATURATION: 97 % | SYSTOLIC BLOOD PRESSURE: 179 MMHG | DIASTOLIC BLOOD PRESSURE: 80 MMHG | HEART RATE: 77 BPM

## 2022-05-06 DIAGNOSIS — L97.422 NON-PRESSURE CHRONIC ULCER OF LEFT HEEL AND MIDFOOT WITH FAT LAYER EXPOSED: ICD-10-CM

## 2022-05-06 PROCEDURE — 93923 UPR/LXTR ART STDY 3+ LVLS: CPT

## 2022-05-06 PROCEDURE — 99203 OFFICE O/P NEW LOW 30 MIN: CPT

## 2022-05-09 ENCOUNTER — APPOINTMENT (OUTPATIENT)
Dept: ELECTROPHYSIOLOGY | Facility: CLINIC | Age: 87
End: 2022-05-09
Payer: MEDICARE

## 2022-05-09 ENCOUNTER — NON-APPOINTMENT (OUTPATIENT)
Age: 87
End: 2022-05-09

## 2022-05-09 VITALS
SYSTOLIC BLOOD PRESSURE: 117 MMHG | HEART RATE: 59 BPM | DIASTOLIC BLOOD PRESSURE: 58 MMHG | WEIGHT: 154 LBS | OXYGEN SATURATION: 99 % | HEIGHT: 63 IN | BODY MASS INDEX: 27.29 KG/M2

## 2022-05-09 PROCEDURE — 93279 PRGRMG DEV EVAL PM/LDLS PM: CPT

## 2022-05-09 RX ORDER — BLOOD SUGAR DIAGNOSTIC
100 STRIP MISCELLANEOUS TWICE DAILY
Refills: 0 | Status: COMPLETED | COMMUNITY
End: 2022-05-09

## 2022-05-09 NOTE — PROCEDURE
[FreeTextEntry1] : 5/6/22 TIGRE/PVR: non-compressible bilaterally \par \par 5/6/22 left heel debrided to subcutanous tissue. Heel prepped with Betadine. 15 blade and forceps used to debride heel wound. Pt tolerated procedure well. Sterile gauze applied to heel wound. Hemostasis occurred with manual pressure.

## 2022-05-09 NOTE — ASSESSMENT
[FreeTextEntry1] : 86 yo female with bilateral non-healing foot wounds. right plantar 1st toe wound is almost closed, scabbed over. Left heel wound is small, about 1 cm x 1 cm with non-viable tissue in wound base, will debride today. \par \par Pt counseled on results of TIGRE/PVR and above diagnosis.\par Pt to wash left heel wound with soap and water. Pat dry. Apply santyl to wound and cover with dry gauze or Band-Aid. Repeat twice daily \par leave right foot open to air\par RTC in 2 weeks to monitor wound\par \par A total of 35 minutes was spent with patient and coordinating care\par

## 2022-05-09 NOTE — PHYSICAL EXAM
[0] : left 0 [2+] : left 2+ [Ankle Swelling (On Exam)] : present [Ankle Swelling Bilaterally] : bilaterally  [Ankle Swelling On The Right] : mild [Alert] : alert [Oriented to Person] : oriented to person [Oriented to Place] : oriented to place [Oriented to Time] : oriented to time [Calm] : calm [Varicose Veins Of Lower Extremities] : not present [] : not present [de-identified] : A&O x 3. NAD [FreeTextEntry1] : left heel wound 1 cm x 0.8 cm with slough in wound base\par right plantar 1st toe with eschar about 0.2 cm x 0.2 cm

## 2022-05-09 NOTE — HISTORY OF PRESENT ILLNESS
[FreeTextEntry1] : 88 yo female PMHx pacemaker, HLD, HTN, sick sinus syndrome, presents with bilateral non-healing foot wounds. Pt has a wound on right plantar 1st toe for several months. She developed a left heel wound after she fractured her left hip in November 2021 and was bed bound for several weeks. She has been seeing podiatry for debridement and wound care. The wound is improving but slowly. She denies any recent fever or chills.

## 2022-05-20 ENCOUNTER — APPOINTMENT (OUTPATIENT)
Dept: VASCULAR SURGERY | Facility: CLINIC | Age: 87
End: 2022-05-20

## 2022-06-03 ENCOUNTER — APPOINTMENT (OUTPATIENT)
Dept: VASCULAR SURGERY | Facility: CLINIC | Age: 87
End: 2022-06-03
Payer: MEDICARE

## 2022-06-03 VITALS
BODY MASS INDEX: 27.29 KG/M2 | DIASTOLIC BLOOD PRESSURE: 60 MMHG | HEIGHT: 63 IN | OXYGEN SATURATION: 95 % | HEART RATE: 65 BPM | SYSTOLIC BLOOD PRESSURE: 149 MMHG | WEIGHT: 154 LBS

## 2022-06-03 PROCEDURE — 99213 OFFICE O/P EST LOW 20 MIN: CPT

## 2022-06-03 NOTE — ASSESSMENT
[FreeTextEntry1] : 88 yo female with bilateral non-healing foot wounds. right plantar 1st toe wound is almost closed, scabbed over. Left heel wound is small, about 1 cm x 1 cm. Pt has increased BLE edema \par \par BLE UNNA boots applied \par RTC in 1 week to monitor wound\par \par A total of 20 minutes was spent with patient and coordinating care\par

## 2022-06-03 NOTE — HISTORY OF PRESENT ILLNESS
[FreeTextEntry1] : 5/6/22: 86 yo female PMHx pacemaker, HLD, HTN, sick sinus syndrome, presents with bilateral non-healing foot wounds. Pt has a wound on right plantar 1st toe for several months. She developed a left heel wound after she fractured her left hip in November 2021 and was bed bound for several weeks. She has been seeing podiatry for debridement and wound care. The wound is improving but slowly. She denies any recent fever or chills. \par \par 6/3/22: Pt doing okay since last visit. She still has left heel wound. She has increased BLE edema. She denies any fever or chills.

## 2022-06-03 NOTE — PHYSICAL EXAM
[0] : left 0 [2+] : left 2+ [Ankle Swelling (On Exam)] : present [Ankle Swelling Bilaterally] : bilaterally  [Ankle Swelling On The Right] : mild [Alert] : alert [Oriented to Person] : oriented to person [Oriented to Place] : oriented to place [Oriented to Time] : oriented to time [Calm] : calm [Varicose Veins Of Lower Extremities] : not present [] : not present [de-identified] : A&O x 3. NAD [FreeTextEntry1] : left heel wound 1 cm x 0.8 cm with slough in wound base\par right plantar 1st toe with eschar about 0.2 cm x 0.2 cm

## 2022-06-10 ENCOUNTER — APPOINTMENT (OUTPATIENT)
Dept: VASCULAR SURGERY | Facility: CLINIC | Age: 87
End: 2022-06-10
Payer: MEDICARE

## 2022-06-10 PROCEDURE — 99213 OFFICE O/P EST LOW 20 MIN: CPT

## 2022-06-13 NOTE — ASSESSMENT
[FreeTextEntry1] : 86 yo female with bilateral non-healing foot wounds. right plantar 1st toe wound is almost closed, scabbed over. Left heel wound is small, about 1 cm x 1 cm. Pt has increased BLE edema \par \par BLE UNNA boots applied \par RTC in 1 week to monitor wound\par \par A total of 20 minutes was spent with patient and coordinating care\par

## 2022-06-13 NOTE — HISTORY OF PRESENT ILLNESS
[FreeTextEntry1] : 5/6/22: 88 yo female PMHx pacemaker, HLD, HTN, sick sinus syndrome, presents with bilateral non-healing foot wounds. Pt has a wound on right plantar 1st toe for several months. She developed a left heel wound after she fractured her left hip in November 2021 and was bed bound for several weeks. She has been seeing podiatry for debridement and wound care. The wound is improving but slowly. She denies any recent fever or chills. \par \par 6/3/22: Pt doing okay since last visit. She still has left heel wound. She has increased BLE edema. She denies any fever or chills. \par \par 6/10/22: Pt doing okay since last visit. She still has left heel wound. She feels her legs improved with UNNA boots. She did feel they were uncomfortable. She denies any fever or chills.

## 2022-06-13 NOTE — PHYSICAL EXAM
[0] : left 0 [2+] : left 2+ [Ankle Swelling (On Exam)] : present [Ankle Swelling Bilaterally] : bilaterally  [Ankle Swelling On The Right] : mild [Alert] : alert [Oriented to Person] : oriented to person [Oriented to Place] : oriented to place [Oriented to Time] : oriented to time [Calm] : calm [Varicose Veins Of Lower Extremities] : not present [] : not present [de-identified] : A&O x 3. NAD [FreeTextEntry1] : left heel wound 1 cm x 0.8 cm with slough in wound base\par

## 2022-06-17 ENCOUNTER — APPOINTMENT (OUTPATIENT)
Dept: VASCULAR SURGERY | Facility: CLINIC | Age: 87
End: 2022-06-17
Payer: MEDICARE

## 2022-06-17 VITALS
BODY MASS INDEX: 27.29 KG/M2 | HEART RATE: 79 BPM | HEIGHT: 63 IN | SYSTOLIC BLOOD PRESSURE: 136 MMHG | WEIGHT: 154 LBS | DIASTOLIC BLOOD PRESSURE: 66 MMHG | OXYGEN SATURATION: 95 %

## 2022-06-17 PROCEDURE — 99213 OFFICE O/P EST LOW 20 MIN: CPT

## 2022-06-17 NOTE — ASSESSMENT
[FreeTextEntry1] : 88 yo female with bilateral non-healing foot wounds. right plantar 1st toe wound is almost closed. Left heel wound is small, about 0.8 cm x 0.4 cm. Pt has increased BLE edema \par \par LLE UNNA boots applied \par RTC in 1 week to monitor wound\par \par A total of 20 minutes was spent with patient and coordinating care\par

## 2022-06-17 NOTE — HISTORY OF PRESENT ILLNESS
[FreeTextEntry1] : 5/6/22: 88 yo female PMHx pacemaker, HLD, HTN, sick sinus syndrome, presents with bilateral non-healing foot wounds. Pt has a wound on right plantar 1st toe for several months. She developed a left heel wound after she fractured her left hip in November 2021 and was bed bound for several weeks. She has been seeing podiatry for debridement and wound care. The wound is improving but slowly. She denies any recent fever or chills. \par \par 6/3/22: Pt doing okay since last visit. She still has left heel wound. She has increased BLE edema. She denies any fever or chills. \par \par 6/10/22: Pt doing okay since last visit. She still has left heel wound. She feels her legs improved with UNNA boots. She did feel they were uncomfortable. She denies any fever or chills. \par \par 6/17/22: Pt doing okay since last visit. She still has left heel wound. She feels her legs improved with UNNA boots.  She denies any fever or chills.

## 2022-06-17 NOTE — PHYSICAL EXAM
[0] : left 0 [2+] : left 2+ [Ankle Swelling (On Exam)] : present [Ankle Swelling Bilaterally] : bilaterally  [Ankle Swelling On The Right] : mild [Alert] : alert [Oriented to Person] : oriented to person [Oriented to Place] : oriented to place [Oriented to Time] : oriented to time [Calm] : calm [Varicose Veins Of Lower Extremities] : not present [] : not present [de-identified] : A&O x 3. NAD [FreeTextEntry1] : left heel wound 0.8 cm x 0.2 cm with slough in wound base\par

## 2022-06-24 ENCOUNTER — APPOINTMENT (OUTPATIENT)
Dept: VASCULAR SURGERY | Facility: CLINIC | Age: 87
End: 2022-06-24
Payer: MEDICARE

## 2022-06-24 VITALS — HEART RATE: 60 BPM | DIASTOLIC BLOOD PRESSURE: 50 MMHG | OXYGEN SATURATION: 97 % | SYSTOLIC BLOOD PRESSURE: 145 MMHG

## 2022-06-24 PROCEDURE — 99213 OFFICE O/P EST LOW 20 MIN: CPT

## 2022-06-24 NOTE — ASSESSMENT
[FreeTextEntry1] : 87 yo female with bilateral non-healing foot wounds. right plantar 1st toe wound is almost closed. Left heel wound is small, about 0.8 cm x 0.4 cm. Pts  BLE edema is improved \par \par Pt counseled to apply bactroban to left heel wound daily and cover with dry gauze\par Pt counseled to use compression stockings daily \par RTC in 4 weeks to monitor wounds\par \par A total of 20 minutes was spent with patient and coordinating care\par

## 2022-06-24 NOTE — PHYSICAL EXAM
[0] : left 0 [2+] : left 2+ [Ankle Swelling (On Exam)] : present [Ankle Swelling Bilaterally] : bilaterally  [Ankle Swelling On The Right] : mild [Alert] : alert [Oriented to Person] : oriented to person [Oriented to Place] : oriented to place [Oriented to Time] : oriented to time [Calm] : calm [Varicose Veins Of Lower Extremities] : not present [] : not present [de-identified] : A&O x 3. NAD [FreeTextEntry1] : left heel wound 0.8 cm x 0.2 cm with slough in wound base\par

## 2022-06-24 NOTE — HISTORY OF PRESENT ILLNESS
[FreeTextEntry1] : 5/6/22: 88 yo female PMHx pacemaker, HLD, HTN, sick sinus syndrome, presents with bilateral non-healing foot wounds. Pt has a wound on right plantar 1st toe for several months. She developed a left heel wound after she fractured her left hip in November 2021 and was bed bound for several weeks. She has been seeing podiatry for debridement and wound care. The wound is improving but slowly. She denies any recent fever or chills. \par \par 6/3/22: Pt doing okay since last visit. She still has left heel wound. She has increased BLE edema. She denies any fever or chills. \par \par 6/10/22: Pt doing okay since last visit. She still has left heel wound. She feels her legs improved with UNNA boots. She did feel they were uncomfortable. She denies any fever or chills. \par \par 6/17/22: Pt doing okay since last visit. She still has left heel wound. She feels her legs improved with UNNA boots.  She denies any fever or chills. \par \par 6/24/22: Pt doing okay since last visit. Her left heel wound is becoming smaller.  She feels her leg swelling is improved with UNNA boots.  She denies any fever or chills.

## 2022-07-08 ENCOUNTER — APPOINTMENT (OUTPATIENT)
Dept: VASCULAR SURGERY | Facility: CLINIC | Age: 87
End: 2022-07-08

## 2022-07-08 VITALS
HEIGHT: 63 IN | OXYGEN SATURATION: 98 % | WEIGHT: 154 LBS | DIASTOLIC BLOOD PRESSURE: 65 MMHG | HEART RATE: 63 BPM | BODY MASS INDEX: 27.29 KG/M2 | SYSTOLIC BLOOD PRESSURE: 133 MMHG

## 2022-07-08 DIAGNOSIS — L97.429 NON-PRESSURE CHRONIC ULCER OF LEFT HEEL AND MIDFOOT WITH UNSPECIFIED SEVERITY: ICD-10-CM

## 2022-07-08 PROCEDURE — 99213 OFFICE O/P EST LOW 20 MIN: CPT

## 2022-07-08 NOTE — PHYSICAL EXAM
[0] : left 0 [2+] : left 2+ [Ankle Swelling (On Exam)] : present [Ankle Swelling Bilaterally] : bilaterally  [Ankle Swelling On The Right] : mild [Alert] : alert [Oriented to Person] : oriented to person [Oriented to Place] : oriented to place [Oriented to Time] : oriented to time [Calm] : calm [Varicose Veins Of Lower Extremities] : not present [] : not present [FreeTextEntry1] : left heel wound 0.8 cm x 0.2 cm with slough in wound base\par  [de-identified] : A&O x 3. NAD

## 2022-07-08 NOTE — ASSESSMENT
[FreeTextEntry1] : 89 yo female with bilateral non-healing foot wounds. right plantar 1st toe wound is almost closed. Left heel wound is small, about 0.8 cm x 0.4 cm. Pts  BLE edema is improved \par \par Pt counseled to apply bactroban to left heel wound daily and cover with dry gauze\par Pt counseled to use compression stockings daily \par RTC in 4 weeks to monitor wounds\par \par A total of 20 minutes was spent with patient and coordinating care\par

## 2022-07-08 NOTE — HISTORY OF PRESENT ILLNESS
[FreeTextEntry1] : 5/6/22: 88 yo female PMHx pacemaker, HLD, HTN, sick sinus syndrome, presents with bilateral non-healing foot wounds. Pt has a wound on right plantar 1st toe for several months. She developed a left heel wound after she fractured her left hip in November 2021 and was bed bound for several weeks. She has been seeing podiatry for debridement and wound care. The wound is improving but slowly. She denies any recent fever or chills. \par \par 6/3/22: Pt doing okay since last visit. She still has left heel wound. She has increased BLE edema. She denies any fever or chills. \par \par 6/10/22: Pt doing okay since last visit. She still has left heel wound. She feels her legs improved with UNNA boots. She did feel they were uncomfortable. She denies any fever or chills. \par \par 6/17/22: Pt doing okay since last visit. She still has left heel wound. She feels her legs improved with UNNA boots.  She denies any fever or chills. \par \par 6/24/22: Pt doing okay since last visit. Her left heel wound is becoming smaller.  She feels her leg swelling is improved with UNNA boots.  She denies any fever or chills. \par \par 7/8/22: Pt doing okay since last visit. Her left heel wound is becoming smaller.  She feels her leg swelling is improved with UNNA boots.  She denies any fever or chills.

## 2022-08-05 ENCOUNTER — APPOINTMENT (OUTPATIENT)
Dept: VASCULAR SURGERY | Facility: CLINIC | Age: 87
End: 2022-08-05

## 2022-08-05 VITALS
SYSTOLIC BLOOD PRESSURE: 139 MMHG | HEART RATE: 69 BPM | OXYGEN SATURATION: 98 % | BODY MASS INDEX: 27.82 KG/M2 | WEIGHT: 157 LBS | DIASTOLIC BLOOD PRESSURE: 57 MMHG | HEIGHT: 63 IN

## 2022-08-05 DIAGNOSIS — R60.0 LOCALIZED EDEMA: ICD-10-CM

## 2022-08-05 PROCEDURE — 99213 OFFICE O/P EST LOW 20 MIN: CPT

## 2022-08-08 ENCOUNTER — APPOINTMENT (OUTPATIENT)
Dept: ELECTROPHYSIOLOGY | Facility: CLINIC | Age: 87
End: 2022-08-08

## 2022-08-08 ENCOUNTER — NON-APPOINTMENT (OUTPATIENT)
Age: 87
End: 2022-08-08

## 2022-08-08 VITALS
HEIGHT: 63 IN | DIASTOLIC BLOOD PRESSURE: 63 MMHG | HEART RATE: 72 BPM | WEIGHT: 157 LBS | OXYGEN SATURATION: 99 % | BODY MASS INDEX: 27.82 KG/M2 | RESPIRATION RATE: 16 BRPM | SYSTOLIC BLOOD PRESSURE: 137 MMHG

## 2022-08-08 PROCEDURE — 93280 PM DEVICE PROGR EVAL DUAL: CPT

## 2022-08-08 RX ORDER — PREDNISONE 10 MG/1
10 TABLET ORAL
Qty: 18 | Refills: 0 | Status: DISCONTINUED | COMMUNITY
Start: 2022-04-04 | End: 2022-08-08

## 2022-08-11 NOTE — ED ADULT NURSE NOTE - IS THE PATIENT ABLE TO BE SCREENED?
Patient is a 65 yo F with PMHx of Asthma, obesity who presented to the clinic on Monday 8/82022 with URI symptoms  Patient tested negative on the rapid covid test in the office  However, patient tested posted on the lab covid test  Patient is requesting Paxlovid covid antiviral medication and a letter for work       - will order Paxlovid for patient   - will send patient letter for work
Yes

## 2022-10-07 ENCOUNTER — APPOINTMENT (OUTPATIENT)
Dept: VASCULAR SURGERY | Facility: CLINIC | Age: 87
End: 2022-10-07

## 2022-10-28 PROBLEM — R60.0 BILATERAL EDEMA OF LOWER EXTREMITY: Status: ACTIVE | Noted: 2022-06-03

## 2022-10-28 NOTE — PHYSICAL EXAM
[0] : left 0 [2+] : left 2+ [Ankle Swelling Bilaterally] : bilaterally  [Ankle Swelling (On Exam)] : present [Ankle Swelling On The Right] : mild [Varicose Veins Of Lower Extremities] : not present [] : not present [Alert] : alert [Oriented to Person] : oriented to person [Oriented to Place] : oriented to place [Oriented to Time] : oriented to time [Calm] : calm [de-identified] : A&O x 3. NAD [FreeTextEntry1] : left heel wound 0.8 cm x 0.2 cm with slough in wound base\par

## 2022-10-28 NOTE — HISTORY OF PRESENT ILLNESS
[FreeTextEntry1] : 5/6/22: 86 yo female PMHx pacemaker, HLD, HTN, sick sinus syndrome, presents with bilateral non-healing foot wounds. Pt has a wound on right plantar 1st toe for several months. She developed a left heel wound after she fractured her left hip in November 2021 and was bed bound for several weeks. She has been seeing podiatry for debridement and wound care. The wound is improving but slowly. She denies any recent fever or chills. \par \par 6/3/22: Pt doing okay since last visit. She still has left heel wound. She has increased BLE edema. She denies any fever or chills. \par \par 6/10/22: Pt doing okay since last visit. She still has left heel wound. She feels her legs improved with UNNA boots. She did feel they were uncomfortable. She denies any fever or chills. \par \par 6/17/22: Pt doing okay since last visit. She still has left heel wound. She feels her legs improved with UNNA boots.  She denies any fever or chills. \par \par 6/24/22: Pt doing okay since last visit. Her left heel wound is becoming smaller.  She feels her leg swelling is improved with UNNA boots.  She denies any fever or chills. \par \par 7/8/22: Pt doing okay since last visit. Her left heel wound is becoming smaller.  She feels her leg swelling is improved with UNNA boots.  She denies any fever or chills.

## 2022-11-14 ENCOUNTER — APPOINTMENT (OUTPATIENT)
Dept: ELECTROPHYSIOLOGY | Facility: CLINIC | Age: 87
End: 2022-11-14

## 2022-11-14 ENCOUNTER — NON-APPOINTMENT (OUTPATIENT)
Age: 87
End: 2022-11-14

## 2022-11-14 VITALS
WEIGHT: 157 LBS | RESPIRATION RATE: 16 BRPM | BODY MASS INDEX: 27.82 KG/M2 | OXYGEN SATURATION: 99 % | HEART RATE: 76 BPM | DIASTOLIC BLOOD PRESSURE: 56 MMHG | SYSTOLIC BLOOD PRESSURE: 128 MMHG | HEIGHT: 63 IN

## 2022-11-14 PROCEDURE — 93279 PRGRMG DEV EVAL PM/LDLS PM: CPT

## 2022-11-14 RX ORDER — ASPIRIN 81 MG
81 TABLET,CHEWABLE ORAL DAILY
Refills: 0 | Status: DISCONTINUED | COMMUNITY
End: 2022-11-14

## 2023-02-01 NOTE — H&P ADULT - NSHPOUTPATIENTPROVIDERS_GEN_ALL_CORE
[address] [facility] [address] [facility]  [address] [facility] [address]  Dept: [phone]  Dept Fax: [phone]: [phone]     CAROL ANN Brambila (: 1998) is a 22 y.o. male, Established patient, here for evaluation of the following chief complaint(s):  Discuss Medications (Patient recently moved into his grandfathers house and this is causing stress and anxiety. Just started back to work and already getting \"lazy\". Last several days he has been sleeping 16 hours a day. )      PCP:  Charyl Jeans, KARLA - CNP      Pt here today for decrease in his moods. Has been very low lately, withdrawing from family and friends again. Has had episodes like this in past, getting on cymbalta made his moods very high and good so thought was solved. Feels moods fluctuate quickly. Recent trigger, He had to move in with his grandpa d/t was too expensive to maintain his apartment. His option was to move back in to mom's \"in a little bedroom\" or move into his grandpa's to help keep an eye on him as he is retiring. His gpa's place is dirty, doesn't want to walk in his socks on the floor, doesn't want to shower d/t shower so dirty. Is living in the attic, drafty and cold. His moods are down, just not wanting to do stuff with friends-was very active on social media. 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 Has good manager and good people around him. Has just a lot of anxiety around all- not showering as much d/t not wanting to shower at his gpa's house d/t is dirty. Worries about way he smells. Sleeping a lot in last couple of days. His gpa fell again the other day and pt never heard him. Feels bad about this. Is frustrated with his mom because thought she would help clean up his gpa's place, but she never comes over. He has missed work because of his moods. Has not seen Dr. Maren Oliver in over a year. PCP: Dr. Ray Mathias   Cardio: Dr. De León  Heme/onc: Dr. Arenas No seizures. Wonders if should get his heart rechecked. Admits never followed up years ago with cardiology was instructed after having the stroke. Review of Systems   Constitutional:  Positive for fatigue. Negative for unexpected weight change. Respiratory: Negative. Negative for chest tightness, shortness of breath and wheezing. Cardiovascular: Negative. Negative for chest pain and leg swelling. Neurological:  Negative for tremors and seizures. Psychiatric/Behavioral:  Positive for dysphoric mood, sleep disturbance and suicidal ideas. The patient is nervous/anxious. 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    Leukocytosis 12/5/2018    Marfan syndrome     Stroke [facility])      Past Surgical History:   Procedure Laterality Date    APPENDECTOMY      HERNIA REPAIR       Social History     Socioeconomic History    Marital status: Single     Spouse name: Not on file    Number of children: Not on file    Years of education: Not on file    Highest education level: Not on file   Occupational History    Not on file   Tobacco Use    Smoking status: Every Day     Types: E-Cigarettes    Smokeless tobacco: Former     Quit date: 2019   Vaping Use    Vaping Use: Every day    Substances: Nicotine    Devices: Pre-filled pod   Substance and Sexual Activity    Alcohol use:  Yes     Alcohol/week: 24.0 standard drinks     Types: 24 Cans of beer per week    Drug use: Yes     Frequency: 7.0 times per week     Types: Marijuana Silva Cotton)    Sexual activity: Not Currently   Other Topics Concern    Not on file   Social History Narrative    Not on file     Social Determinants of Health     Financial Resource Strain: Low Risk     Difficulty of Paying Living Expenses: Not very hard   Food Insecurity: No Food Insecurity    Worried About Running Out of Food in the Last Year: Never true    Ran Out of Food in the Last Year: Never true   Transportation Needs: Unknown    Lack of Transportation (Medical): Not on file    Lack of Transportation (Non-Medical): No   Physical Activity: Not on file   Stress: Not on file   Social Connections: Not on file   Intimate Partner Violence: Not on file   Housing Stability: Unknown    Unable to Pay for Housing in the Last Year: Not on file    Number of Places Lived in the Last Year: Not on file    Unstable Housing in the Last Year: No     Family History   Problem Relation Age of Onset    Diabetes Maternal Grandmother     Rheum Arthritis Maternal Grandmother     Diabetes Maternal Grandfather     Hypertension Maternal Grandfather       No Known Allergies  Prior to Admission medications    Medication Sig Start Date End Date Taking? Authorizing Provider   ARIPiprazole (ABILIFY) 5 MG tablet Take 1 tablet by mouth daily 2/1/23  Yes KARLA Azevedo CNP   DULoxetine (CYMBALTA) 30 MG extended release capsule Take 1 capsule by mouth 2 times daily 12/5/22  Yes KARLA Azevedo CNP   levETIRAcetam (KEPPRA) 500 MG tablet TAKE 1 TABLET BY MOUTH IN THE MORNING AND 1 BEFORE BEDTIME 11/10/22  Yes KARLA Azevedo CNP   atorvastatin (LIPITOR) 10 MG tablet Take 1 tablet by mouth at bedtime 8/24/22  Yes KARLA Azevedo CNP   fluticasone Jean Apley) 50 MCG/ACT nasal spray 2 sprays by Each Nostril route daily 11/10/21  Yes KARLA Azevedo CNP                I have reviewed the patient's medical history in detail and updated the computerized patient record. OBJECTIVE    Vitals:    02/01/23 1315   BP: 126/88   Site: Right Upper Arm   Position: Sitting   Cuff Size: Medium Adult   Pulse: 98   Resp: 16   SpO2: 95%   Weight: 183 lb 12.8 oz (83.4 kg)   Height: 6' 2\" (1.88 m)       Physical Exam  Vitals and nursing note reviewed. Constitutional:       General: He is not in acute distress. Appearance: He is well-developed.    HENT:      Head: Normocephalic and atraumatic. Neck:      Thyroid: No thyromegaly. Cardiovascular:      Rate and Rhythm: Normal rate and regular rhythm. Heart sounds: Normal heart sounds. No murmur heard. Pulmonary:      Effort: Pulmonary effort is normal. No respiratory distress. Breath sounds: Normal breath sounds. No wheezing. Musculoskeletal:      Cervical back: Normal range of motion. Lymphadenopathy:      Cervical: No cervical adenopathy. Skin:     General: Skin is warm and dry. Neurological:      Mental Status: He is alert and oriented to person, place, and time. Psychiatric:         Mood and Affect: Mood normal. Affect is flat. Behavior: Behavior normal.         ASSESSMENT/ PLAN    1. MDD (major depressive disorder), recurrent episode, moderate (HCC)  Chronic, deteriorated. Phq9=19  -suspect more of a bipolar depression based on his hx and these low episodes combined with quick rebound  -will continue on cymbalta (may be too high and causing anxiety? ?), but add abilify on  -f/u 1 month  -confirms safety plan, no suicidal plan just thoughts. If progresses to plan, will get help    2. Mood disorder (HCC)  Chronic worse. Suspect bipolar depressive disorder. Adding abilify to cymbalta    3. Other generalized epilepsy, not intractable, without status epilepticus ([facility] [address]  Chronic, stable  -on keppra, no seizures. Continue dose unchanged  -enc to get back in with neuro Dr. Juan Ramon Smith since has been a year          Return in about 1 month (around 3/1/2023) for mood recheck.      Electronically signed by:  KARLA Velasquez - GENI   2/1/23

## 2023-03-08 ENCOUNTER — NON-APPOINTMENT (OUTPATIENT)
Age: 88
End: 2023-03-08

## 2023-03-08 ENCOUNTER — APPOINTMENT (OUTPATIENT)
Dept: ELECTROPHYSIOLOGY | Facility: CLINIC | Age: 88
End: 2023-03-08
Payer: MEDICARE

## 2023-03-08 VITALS
RESPIRATION RATE: 16 BRPM | HEART RATE: 90 BPM | WEIGHT: 143 LBS | DIASTOLIC BLOOD PRESSURE: 63 MMHG | SYSTOLIC BLOOD PRESSURE: 125 MMHG | OXYGEN SATURATION: 99 % | HEIGHT: 63 IN | BODY MASS INDEX: 25.34 KG/M2

## 2023-03-08 PROCEDURE — 93279 PRGRMG DEV EVAL PM/LDLS PM: CPT

## 2023-03-08 RX ORDER — HYDROCODONE BITARTRATE AND ACETAMINOPHEN 5; 325 MG/1; MG/1
5-325 TABLET ORAL
Qty: 14 | Refills: 0 | Status: DISCONTINUED | COMMUNITY
Start: 2022-04-11 | End: 2023-03-08

## 2023-03-17 ENCOUNTER — APPOINTMENT (OUTPATIENT)
Dept: VASCULAR SURGERY | Facility: CLINIC | Age: 88
End: 2023-03-17
Payer: MEDICARE

## 2023-03-17 VITALS
HEART RATE: 70 BPM | SYSTOLIC BLOOD PRESSURE: 137 MMHG | WEIGHT: 143 LBS | BODY MASS INDEX: 25.34 KG/M2 | RESPIRATION RATE: 16 BRPM | DIASTOLIC BLOOD PRESSURE: 71 MMHG | HEIGHT: 63 IN | OXYGEN SATURATION: 99 %

## 2023-03-17 DIAGNOSIS — S31.000D UNSPECIFIED OPEN WOUND OF LOWER BACK AND PELVIS W/OUT PENETRATION INTO RETROPERITONEUM, SUBSEQUENT ENCOUNTER: ICD-10-CM

## 2023-03-17 PROCEDURE — 99213 OFFICE O/P EST LOW 20 MIN: CPT

## 2023-03-17 NOTE — ASSESSMENT
[FreeTextEntry1] : 87 yo female with sacral wound. Pt also has a small left heel scab.\par \par Will send pt to [facility] wound care center. \par Pt counseled to use compression stockings daily to help control edema \par \par A total of 20 minutes was spent with patient and coordinating care\par

## 2023-03-17 NOTE — HISTORY OF PRESENT ILLNESS
[FreeTextEntry1] : 5/6/22: 86 yo female PMHx pacemaker, HLD, HTN, sick sinus syndrome, presents with bilateral non-healing foot wounds. Pt has a wound on right plantar 1st toe for several months. She developed a left heel wound after she fractured her left hip in November 2021 and was bed bound for several weeks. She has been seeing podiatry for debridement and wound care. The wound is improving but slowly. She denies any recent fever or chills. \par \par 6/3/22: Pt doing okay since last visit. She still has left heel wound. She has increased BLE edema. She denies any fever or chills. \par \par 6/10/22: Pt doing okay since last visit. She still has left heel wound. She feels her legs improved with UNNA boots. She did feel they were uncomfortable. She denies any fever or chills. \par \par 6/17/22: Pt doing okay since last visit. She still has left heel wound. She feels her legs improved with UNNA boots.  She denies any fever or chills. \par \par 6/24/22: Pt doing okay since last visit. Her left heel wound is becoming smaller.  She feels her leg swelling is improved with UNNA boots.  She denies any fever or chills. \par \par 7/8/22: Pt doing okay since last visit. Her left heel wound is becoming smaller.  She feels her leg swelling is improved with UNNA boots.  She denies any fever or chills. \par \par 3/17/23: Pt doing okay since last visit. Her left heel wound is very small and scabbed over. She has a new sacral wound. She denies any fever or chills.

## 2023-03-22 ENCOUNTER — RESULT REVIEW (OUTPATIENT)
Age: 88
End: 2023-03-22

## 2023-03-22 ENCOUNTER — OUTPATIENT (OUTPATIENT)
Dept: OUTPATIENT SERVICES | Facility: HOSPITAL | Age: 88
LOS: 1 days | End: 2023-03-22
Payer: MEDICARE

## 2023-03-22 DIAGNOSIS — L89.159 PRESSURE ULCER OF SACRAL REGION, UNSPECIFIED STAGE: ICD-10-CM

## 2023-03-22 DIAGNOSIS — Z95.0 PRESENCE OF CARDIAC PACEMAKER: Chronic | ICD-10-CM

## 2023-03-22 PROCEDURE — 93923 UPR/LXTR ART STDY 3+ LVLS: CPT

## 2023-03-22 PROCEDURE — 99213 OFFICE O/P EST LOW 20 MIN: CPT | Mod: 25

## 2023-03-22 PROCEDURE — 93923 UPR/LXTR ART STDY 3+ LVLS: CPT | Mod: 26

## 2023-03-22 PROCEDURE — 99203 OFFICE O/P NEW LOW 30 MIN: CPT

## 2023-03-23 ENCOUNTER — NON-APPOINTMENT (OUTPATIENT)
Age: 88
End: 2023-03-23

## 2023-03-24 DIAGNOSIS — I49.9 CARDIAC ARRHYTHMIA, UNSPECIFIED: ICD-10-CM

## 2023-03-24 DIAGNOSIS — I25.2 OLD MYOCARDIAL INFARCTION: ICD-10-CM

## 2023-03-24 DIAGNOSIS — I25.10 ATHEROSCLEROTIC HEART DISEASE OF NATIVE CORONARY ARTERY WITHOUT ANGINA PECTORIS: ICD-10-CM

## 2023-03-24 DIAGNOSIS — L89.156 PRESSURE-INDUCED DEEP TISSUE DAMAGE OF SACRAL REGION: ICD-10-CM

## 2023-04-05 ENCOUNTER — OUTPATIENT (OUTPATIENT)
Dept: OUTPATIENT SERVICES | Facility: HOSPITAL | Age: 88
LOS: 1 days | End: 2023-04-05
Payer: MEDICARE

## 2023-04-05 DIAGNOSIS — Z95.0 PRESENCE OF CARDIAC PACEMAKER: Chronic | ICD-10-CM

## 2023-04-05 DIAGNOSIS — I49.9 CARDIAC ARRHYTHMIA, UNSPECIFIED: ICD-10-CM

## 2023-04-05 DIAGNOSIS — I25.10 ATHEROSCLEROTIC HEART DISEASE OF NATIVE CORONARY ARTERY WITHOUT ANGINA PECTORIS: ICD-10-CM

## 2023-04-05 DIAGNOSIS — L89.156 PRESSURE-INDUCED DEEP TISSUE DAMAGE OF SACRAL REGION: ICD-10-CM

## 2023-04-05 PROCEDURE — 99212 OFFICE O/P EST SF 10 MIN: CPT

## 2023-06-14 ENCOUNTER — APPOINTMENT (OUTPATIENT)
Dept: ELECTROPHYSIOLOGY | Facility: CLINIC | Age: 88
End: 2023-06-14
Payer: MEDICARE

## 2023-06-14 ENCOUNTER — NON-APPOINTMENT (OUTPATIENT)
Age: 88
End: 2023-06-14

## 2023-06-14 VITALS
BODY MASS INDEX: 24.8 KG/M2 | SYSTOLIC BLOOD PRESSURE: 153 MMHG | OXYGEN SATURATION: 100 % | DIASTOLIC BLOOD PRESSURE: 79 MMHG | HEIGHT: 63 IN | WEIGHT: 140 LBS | HEART RATE: 70 BPM

## 2023-06-14 PROCEDURE — 93279 PRGRMG DEV EVAL PM/LDLS PM: CPT

## 2023-06-14 RX ORDER — TRAMADOL HYDROCHLORIDE 50 MG/1
50 TABLET, COATED ORAL
Qty: 30 | Refills: 0 | Status: DISCONTINUED | COMMUNITY
Start: 2023-03-08 | End: 2023-06-14

## 2023-06-14 RX ORDER — PREDNISONE 5 MG/1
5 TABLET ORAL
Refills: 0 | Status: DISCONTINUED | COMMUNITY
End: 2023-06-14

## 2023-09-20 ENCOUNTER — APPOINTMENT (OUTPATIENT)
Dept: ELECTROPHYSIOLOGY | Facility: CLINIC | Age: 88
End: 2023-09-20
Payer: MEDICARE

## 2023-09-20 ENCOUNTER — NON-APPOINTMENT (OUTPATIENT)
Age: 88
End: 2023-09-20

## 2023-09-20 VITALS
OXYGEN SATURATION: 100 % | SYSTOLIC BLOOD PRESSURE: 129 MMHG | HEIGHT: 63 IN | DIASTOLIC BLOOD PRESSURE: 59 MMHG | WEIGHT: 131 LBS | HEART RATE: 64 BPM | BODY MASS INDEX: 23.21 KG/M2

## 2023-09-20 PROCEDURE — 93279 PRGRMG DEV EVAL PM/LDLS PM: CPT

## 2023-09-20 RX ORDER — HYDROXYUREA 500 MG/1
500 CAPSULE ORAL DAILY
Refills: 0 | Status: DISCONTINUED | COMMUNITY
End: 2023-09-20

## 2023-09-20 RX ORDER — COLLAGENASE SANTYL 250 [ARB'U]/G
250 OINTMENT TOPICAL
Qty: 1 | Refills: 3 | Status: DISCONTINUED | COMMUNITY
Start: 2022-05-06 | End: 2023-09-20

## 2023-09-20 RX ORDER — WARFARIN 2.5 MG/1
2.5 TABLET ORAL
Qty: 90 | Refills: 0 | Status: DISCONTINUED | COMMUNITY
Start: 2021-12-29 | End: 2023-09-20

## 2023-10-30 NOTE — PATIENT PROFILE ADULT - NSASFALLNEEDSASSISTWITH_GEN_A_NUR
Discharge instructions signed by mother.      Cierra Fry RN  10/29/23 1815 standing/walking/toileting

## 2023-12-13 ENCOUNTER — APPOINTMENT (OUTPATIENT)
Dept: ELECTROPHYSIOLOGY | Facility: CLINIC | Age: 88
End: 2023-12-13
Payer: MEDICARE

## 2023-12-13 ENCOUNTER — NON-APPOINTMENT (OUTPATIENT)
Age: 88
End: 2023-12-13

## 2023-12-13 VITALS
WEIGHT: 120 LBS | DIASTOLIC BLOOD PRESSURE: 64 MMHG | SYSTOLIC BLOOD PRESSURE: 132 MMHG | HEART RATE: 69 BPM | OXYGEN SATURATION: 100 % | HEIGHT: 63 IN | BODY MASS INDEX: 21.26 KG/M2

## 2023-12-13 DIAGNOSIS — I49.5 SICK SINUS SYNDROME: ICD-10-CM

## 2023-12-13 DIAGNOSIS — I44.2 ATRIOVENTRICULAR BLOCK, COMPLETE: ICD-10-CM

## 2023-12-13 PROCEDURE — 93279 PRGRMG DEV EVAL PM/LDLS PM: CPT

## 2023-12-13 RX ORDER — WARFARIN 2 MG/1
2 TABLET ORAL DAILY
Refills: 0 | Status: ACTIVE | COMMUNITY
Start: 2022-01-10

## 2023-12-13 RX ORDER — CETIRIZINE HCL 10 MG
10 TABLET ORAL
Refills: 0 | Status: ACTIVE | COMMUNITY

## 2024-03-11 ENCOUNTER — NON-APPOINTMENT (OUTPATIENT)
Age: 89
End: 2024-03-11

## 2024-03-11 ENCOUNTER — APPOINTMENT (OUTPATIENT)
Dept: ELECTROPHYSIOLOGY | Facility: CLINIC | Age: 89
End: 2024-03-11
Payer: MEDICARE

## 2024-03-11 VITALS
WEIGHT: 120 LBS | OXYGEN SATURATION: 100 % | SYSTOLIC BLOOD PRESSURE: 136 MMHG | HEIGHT: 63 IN | RESPIRATION RATE: 16 BRPM | BODY MASS INDEX: 21.26 KG/M2 | DIASTOLIC BLOOD PRESSURE: 71 MMHG | HEART RATE: 65 BPM

## 2024-03-11 DIAGNOSIS — I48.91 UNSPECIFIED ATRIAL FIBRILLATION: ICD-10-CM

## 2024-03-11 DIAGNOSIS — Z95.0 PRESENCE OF CARDIAC PACEMAKER: ICD-10-CM

## 2024-03-11 PROCEDURE — 93279 PRGRMG DEV EVAL PM/LDLS PM: CPT

## 2024-03-11 RX ORDER — FUROSEMIDE 40 MG/1
40 TABLET ORAL
Refills: 0 | Status: DISCONTINUED | COMMUNITY
End: 2024-03-11

## 2024-03-11 RX ORDER — FUROSEMIDE 20 MG/1
20 TABLET ORAL DAILY
Refills: 0 | Status: ACTIVE | COMMUNITY

## 2024-04-27 ENCOUNTER — INPATIENT (INPATIENT)
Facility: HOSPITAL | Age: 89
LOS: 5 days | Discharge: HOME | DRG: 871 | End: 2024-05-03
Attending: STUDENT IN AN ORGANIZED HEALTH CARE EDUCATION/TRAINING PROGRAM | Admitting: FAMILY MEDICINE
Payer: MEDICARE

## 2024-04-27 VITALS
HEART RATE: 46 BPM | WEIGHT: 110.01 LBS | RESPIRATION RATE: 18 BRPM | SYSTOLIC BLOOD PRESSURE: 156 MMHG | DIASTOLIC BLOOD PRESSURE: 68 MMHG | TEMPERATURE: 98 F | HEIGHT: 63 IN | OXYGEN SATURATION: 100 %

## 2024-04-27 DIAGNOSIS — Z95.0 PRESENCE OF CARDIAC PACEMAKER: Chronic | ICD-10-CM

## 2024-04-27 DIAGNOSIS — E43 UNSPECIFIED SEVERE PROTEIN-CALORIE MALNUTRITION: ICD-10-CM

## 2024-04-27 DIAGNOSIS — Z88.8 ALLERGY STATUS TO OTHER DRUGS, MEDICAMENTS AND BIOLOGICAL SUBSTANCES STATUS: ICD-10-CM

## 2024-04-27 DIAGNOSIS — K05.10 CHRONIC GINGIVITIS, PLAQUE INDUCED: ICD-10-CM

## 2024-04-27 DIAGNOSIS — G89.29 OTHER CHRONIC PAIN: ICD-10-CM

## 2024-04-27 DIAGNOSIS — I50.32 CHRONIC DIASTOLIC (CONGESTIVE) HEART FAILURE: ICD-10-CM

## 2024-04-27 DIAGNOSIS — R79.1 ABNORMAL COAGULATION PROFILE: ICD-10-CM

## 2024-04-27 DIAGNOSIS — B95.2 ENTEROCOCCUS AS THE CAUSE OF DISEASES CLASSIFIED ELSEWHERE: ICD-10-CM

## 2024-04-27 DIAGNOSIS — I25.10 ATHEROSCLEROTIC HEART DISEASE OF NATIVE CORONARY ARTERY WITHOUT ANGINA PECTORIS: ICD-10-CM

## 2024-04-27 DIAGNOSIS — I48.20 CHRONIC ATRIAL FIBRILLATION, UNSPECIFIED: ICD-10-CM

## 2024-04-27 DIAGNOSIS — M54.9 DORSALGIA, UNSPECIFIED: ICD-10-CM

## 2024-04-27 DIAGNOSIS — Z79.82 LONG TERM (CURRENT) USE OF ASPIRIN: ICD-10-CM

## 2024-04-27 DIAGNOSIS — Z95.5 PRESENCE OF CORONARY ANGIOPLASTY IMPLANT AND GRAFT: ICD-10-CM

## 2024-04-27 DIAGNOSIS — I11.0 HYPERTENSIVE HEART DISEASE WITH HEART FAILURE: ICD-10-CM

## 2024-04-27 DIAGNOSIS — Z79.01 LONG TERM (CURRENT) USE OF ANTICOAGULANTS: ICD-10-CM

## 2024-04-27 DIAGNOSIS — Z88.0 ALLERGY STATUS TO PENICILLIN: ICD-10-CM

## 2024-04-27 DIAGNOSIS — E27.9 DISORDER OF ADRENAL GLAND, UNSPECIFIED: ICD-10-CM

## 2024-04-27 DIAGNOSIS — I27.20 PULMONARY HYPERTENSION, UNSPECIFIED: ICD-10-CM

## 2024-04-27 DIAGNOSIS — I49.5 SICK SINUS SYNDROME: ICD-10-CM

## 2024-04-27 DIAGNOSIS — K83.09 OTHER CHOLANGITIS: ICD-10-CM

## 2024-04-27 DIAGNOSIS — Z66 DO NOT RESUSCITATE: ICD-10-CM

## 2024-04-27 DIAGNOSIS — Z51.5 ENCOUNTER FOR PALLIATIVE CARE: ICD-10-CM

## 2024-04-27 DIAGNOSIS — K81.0 ACUTE CHOLECYSTITIS: ICD-10-CM

## 2024-04-27 DIAGNOSIS — M19.90 UNSPECIFIED OSTEOARTHRITIS, UNSPECIFIED SITE: ICD-10-CM

## 2024-04-27 DIAGNOSIS — Z45.010 ENCOUNTER FOR CHECKING AND TESTING OF CARDIAC PACEMAKER PULSE GENERATOR [BATTERY]: ICD-10-CM

## 2024-04-27 DIAGNOSIS — A41.9 SEPSIS, UNSPECIFIED ORGANISM: ICD-10-CM

## 2024-04-27 DIAGNOSIS — D47.3 ESSENTIAL (HEMORRHAGIC) THROMBOCYTHEMIA: ICD-10-CM

## 2024-04-27 DIAGNOSIS — G47.33 OBSTRUCTIVE SLEEP APNEA (ADULT) (PEDIATRIC): ICD-10-CM

## 2024-04-27 DIAGNOSIS — M27.2 INFLAMMATORY CONDITIONS OF JAWS: ICD-10-CM

## 2024-04-27 DIAGNOSIS — K81.9 CHOLECYSTITIS, UNSPECIFIED: ICD-10-CM

## 2024-04-27 LAB
ALBUMIN SERPL ELPH-MCNC: 3.8 G/DL — SIGNIFICANT CHANGE UP (ref 3.3–5)
ALP SERPL-CCNC: 150 U/L — HIGH (ref 40–120)
ALT FLD-CCNC: 17 U/L — SIGNIFICANT CHANGE UP (ref 12–78)
ANION GAP SERPL CALC-SCNC: 14 MMOL/L — SIGNIFICANT CHANGE UP (ref 5–17)
ANISOCYTOSIS BLD QL: SLIGHT — SIGNIFICANT CHANGE UP
APPEARANCE UR: CLEAR — SIGNIFICANT CHANGE UP
APTT BLD: 110.4 SEC — HIGH (ref 24.5–35.6)
AST SERPL-CCNC: 45 U/L — HIGH (ref 15–37)
BACTERIA # UR AUTO: NEGATIVE /HPF — SIGNIFICANT CHANGE UP
BASOPHILS # BLD AUTO: 0.21 K/UL — HIGH (ref 0–0.2)
BASOPHILS NFR BLD AUTO: 1 % — SIGNIFICANT CHANGE UP (ref 0–2)
BILIRUB SERPL-MCNC: 0.6 MG/DL — SIGNIFICANT CHANGE UP (ref 0.2–1.2)
BILIRUB UR-MCNC: NEGATIVE — SIGNIFICANT CHANGE UP
BUN SERPL-MCNC: 42 MG/DL — HIGH (ref 7–23)
CALCIUM SERPL-MCNC: 10.2 MG/DL — HIGH (ref 8.5–10.1)
CAST: 5 /LPF — HIGH (ref 0–4)
CHLORIDE SERPL-SCNC: 105 MMOL/L — SIGNIFICANT CHANGE UP (ref 96–108)
CK SERPL-CCNC: 88 U/L — SIGNIFICANT CHANGE UP (ref 26–192)
CO2 SERPL-SCNC: 18 MMOL/L — LOW (ref 22–31)
COLOR SPEC: YELLOW — SIGNIFICANT CHANGE UP
CREAT SERPL-MCNC: 1.28 MG/DL — SIGNIFICANT CHANGE UP (ref 0.5–1.3)
DIFF PNL FLD: ABNORMAL
EGFR: 40 ML/MIN/1.73M2 — LOW
ELLIPTOCYTES BLD QL SMEAR: SLIGHT — SIGNIFICANT CHANGE UP
EOSINOPHIL # BLD AUTO: 0 K/UL — SIGNIFICANT CHANGE UP (ref 0–0.5)
EOSINOPHIL NFR BLD AUTO: 0 % — SIGNIFICANT CHANGE UP (ref 0–6)
GLUCOSE SERPL-MCNC: 190 MG/DL — HIGH (ref 70–99)
GLUCOSE UR QL: NEGATIVE MG/DL — SIGNIFICANT CHANGE UP
HCT VFR BLD CALC: 47.2 % — HIGH (ref 34.5–45)
HGB BLD-MCNC: 14.2 G/DL — SIGNIFICANT CHANGE UP (ref 11.5–15.5)
INR BLD: 6.73 RATIO — CRITICAL HIGH (ref 0.85–1.18)
KETONES UR-MCNC: 40 MG/DL
LACTATE SERPL-SCNC: 1.6 MMOL/L — SIGNIFICANT CHANGE UP (ref 0.7–2)
LACTATE SERPL-SCNC: 2.8 MMOL/L — HIGH (ref 0.7–2)
LEUKOCYTE ESTERASE UR-ACNC: ABNORMAL
LIDOCAIN IGE QN: 53 U/L — SIGNIFICANT CHANGE UP (ref 13–75)
LYMPHOCYTES # BLD AUTO: 0.21 K/UL — LOW (ref 1–3.3)
LYMPHOCYTES # BLD AUTO: 1 % — LOW (ref 13–44)
MACROCYTES BLD QL: SLIGHT — SIGNIFICANT CHANGE UP
MANUAL SMEAR VERIFICATION: SIGNIFICANT CHANGE UP
MCHC RBC-ENTMCNC: 30.1 GM/DL — LOW (ref 32–36)
MCHC RBC-ENTMCNC: 30.3 PG — SIGNIFICANT CHANGE UP (ref 27–34)
MCV RBC AUTO: 100.9 FL — HIGH (ref 80–100)
MICROCYTES BLD QL: SLIGHT — SIGNIFICANT CHANGE UP
MONOCYTES # BLD AUTO: 0.42 K/UL — SIGNIFICANT CHANGE UP (ref 0–0.9)
MONOCYTES NFR BLD AUTO: 2 % — SIGNIFICANT CHANGE UP (ref 2–14)
NEUTROPHILS # BLD AUTO: 20.16 K/UL — HIGH (ref 1.8–7.4)
NEUTROPHILS NFR BLD AUTO: 96 % — HIGH (ref 43–77)
NITRITE UR-MCNC: NEGATIVE — SIGNIFICANT CHANGE UP
NRBC # BLD: 0 /100 WBCS — SIGNIFICANT CHANGE UP (ref 0–0)
NRBC # BLD: SIGNIFICANT CHANGE UP /100 WBCS (ref 0–0)
PH UR: 5 — SIGNIFICANT CHANGE UP (ref 5–8)
PLAT MORPH BLD: NORMAL — SIGNIFICANT CHANGE UP
PLATELET # BLD AUTO: 615 K/UL — HIGH (ref 150–400)
PLATELET COUNT - ESTIMATE: ABNORMAL
POIKILOCYTOSIS BLD QL AUTO: SLIGHT — SIGNIFICANT CHANGE UP
POTASSIUM SERPL-MCNC: 4.9 MMOL/L — SIGNIFICANT CHANGE UP (ref 3.5–5.3)
POTASSIUM SERPL-SCNC: 4.9 MMOL/L — SIGNIFICANT CHANGE UP (ref 3.5–5.3)
PROT SERPL-MCNC: 7.3 GM/DL — SIGNIFICANT CHANGE UP (ref 6–8.3)
PROT UR-MCNC: 30 MG/DL
PROTHROM AB SERPL-ACNC: 71.9 SEC — HIGH (ref 9.5–13)
RBC # BLD: 4.68 M/UL — SIGNIFICANT CHANGE UP (ref 3.8–5.2)
RBC # FLD: 20.1 % — HIGH (ref 10.3–14.5)
RBC BLD AUTO: ABNORMAL
RBC CASTS # UR COMP ASSIST: 1 /HPF — SIGNIFICANT CHANGE UP (ref 0–4)
SCHISTOCYTES BLD QL AUTO: SLIGHT — SIGNIFICANT CHANGE UP
SODIUM SERPL-SCNC: 137 MMOL/L — SIGNIFICANT CHANGE UP (ref 135–145)
SP GR SPEC: 1.02 — SIGNIFICANT CHANGE UP (ref 1–1.03)
SQUAMOUS # UR AUTO: 4 /HPF — SIGNIFICANT CHANGE UP (ref 0–5)
TROPONIN I, HIGH SENSITIVITY RESULT: 34.51 NG/L — SIGNIFICANT CHANGE UP
UROBILINOGEN FLD QL: 0.2 MG/DL — SIGNIFICANT CHANGE UP (ref 0.2–1)
WBC # BLD: 21 K/UL — HIGH (ref 3.8–10.5)
WBC # FLD AUTO: 21 K/UL — HIGH (ref 3.8–10.5)
WBC UR QL: 10 /HPF — HIGH (ref 0–5)

## 2024-04-27 PROCEDURE — 80053 COMPREHEN METABOLIC PANEL: CPT

## 2024-04-27 PROCEDURE — 85730 THROMBOPLASTIN TIME PARTIAL: CPT

## 2024-04-27 PROCEDURE — 71045 X-RAY EXAM CHEST 1 VIEW: CPT | Mod: 26

## 2024-04-27 PROCEDURE — 99285 EMERGENCY DEPT VISIT HI MDM: CPT

## 2024-04-27 PROCEDURE — 36415 COLL VENOUS BLD VENIPUNCTURE: CPT

## 2024-04-27 PROCEDURE — 86850 RBC ANTIBODY SCREEN: CPT

## 2024-04-27 PROCEDURE — 92523 SPEECH SOUND LANG COMPREHEN: CPT | Mod: GN

## 2024-04-27 PROCEDURE — 78226 HEPATOBILIARY SYSTEM IMAGING: CPT | Mod: MC

## 2024-04-27 PROCEDURE — 76705 ECHO EXAM OF ABDOMEN: CPT | Mod: 26

## 2024-04-27 PROCEDURE — 85027 COMPLETE CBC AUTOMATED: CPT

## 2024-04-27 PROCEDURE — A9537: CPT

## 2024-04-27 PROCEDURE — 97530 THERAPEUTIC ACTIVITIES: CPT | Mod: GP

## 2024-04-27 PROCEDURE — 86901 BLOOD TYPING SEROLOGIC RH(D): CPT

## 2024-04-27 PROCEDURE — 74177 CT ABD & PELVIS W/CONTRAST: CPT | Mod: 26,MC

## 2024-04-27 PROCEDURE — 86900 BLOOD TYPING SEROLOGIC ABO: CPT

## 2024-04-27 PROCEDURE — 97116 GAIT TRAINING THERAPY: CPT | Mod: GP

## 2024-04-27 PROCEDURE — 71260 CT THORAX DX C+: CPT | Mod: 26,MC

## 2024-04-27 PROCEDURE — 92610 EVALUATE SWALLOWING FUNCTION: CPT | Mod: GN

## 2024-04-27 PROCEDURE — 80048 BASIC METABOLIC PNL TOTAL CA: CPT

## 2024-04-27 PROCEDURE — 85610 PROTHROMBIN TIME: CPT

## 2024-04-27 PROCEDURE — 99222 1ST HOSP IP/OBS MODERATE 55: CPT

## 2024-04-27 PROCEDURE — 97162 PT EVAL MOD COMPLEX 30 MIN: CPT | Mod: GP

## 2024-04-27 PROCEDURE — 85025 COMPLETE CBC W/AUTO DIFF WBC: CPT

## 2024-04-27 RX ORDER — CEFEPIME 1 G/1
1000 INJECTION, POWDER, FOR SOLUTION INTRAMUSCULAR; INTRAVENOUS EVERY 12 HOURS
Refills: 0 | Status: DISCONTINUED | OUTPATIENT
Start: 2024-04-27 | End: 2024-04-27

## 2024-04-27 RX ORDER — SODIUM CHLORIDE 9 MG/ML
500 INJECTION INTRAMUSCULAR; INTRAVENOUS; SUBCUTANEOUS ONCE
Refills: 0 | Status: COMPLETED | OUTPATIENT
Start: 2024-04-27 | End: 2024-04-27

## 2024-04-27 RX ORDER — POTASSIUM CHLORIDE 20 MEQ
1 PACKET (EA) ORAL
Qty: 0 | Refills: 0 | DISCHARGE

## 2024-04-27 RX ORDER — CEFEPIME 1 G/1
1000 INJECTION, POWDER, FOR SOLUTION INTRAMUSCULAR; INTRAVENOUS EVERY 12 HOURS
Refills: 0 | Status: DISCONTINUED | OUTPATIENT
Start: 2024-04-27 | End: 2024-04-28

## 2024-04-27 RX ORDER — MORPHINE SULFATE 50 MG/1
2 CAPSULE, EXTENDED RELEASE ORAL ONCE
Refills: 0 | Status: DISCONTINUED | OUTPATIENT
Start: 2024-04-27 | End: 2024-04-29

## 2024-04-27 RX ORDER — FUROSEMIDE 40 MG
1 TABLET ORAL
Qty: 0 | Refills: 0 | DISCHARGE

## 2024-04-27 RX ORDER — VANCOMYCIN HCL 1 G
1000 VIAL (EA) INTRAVENOUS ONCE
Refills: 0 | Status: DISCONTINUED | OUTPATIENT
Start: 2024-04-27 | End: 2024-04-27

## 2024-04-27 RX ORDER — CEFEPIME 1 G/1
1000 INJECTION, POWDER, FOR SOLUTION INTRAMUSCULAR; INTRAVENOUS ONCE
Refills: 0 | Status: COMPLETED | OUTPATIENT
Start: 2024-04-27 | End: 2024-04-27

## 2024-04-27 RX ORDER — VANCOMYCIN HCL 1 G
750 VIAL (EA) INTRAVENOUS ONCE
Refills: 0 | Status: COMPLETED | OUTPATIENT
Start: 2024-04-27 | End: 2024-04-27

## 2024-04-27 RX ORDER — WARFARIN SODIUM 2.5 MG/1
1 TABLET ORAL
Qty: 0 | Refills: 0 | DISCHARGE

## 2024-04-27 RX ORDER — ROSUVASTATIN CALCIUM 5 MG/1
1 TABLET ORAL
Qty: 0 | Refills: 0 | DISCHARGE

## 2024-04-27 RX ORDER — SODIUM CHLORIDE 9 MG/ML
1000 INJECTION INTRAMUSCULAR; INTRAVENOUS; SUBCUTANEOUS
Refills: 0 | Status: DISCONTINUED | OUTPATIENT
Start: 2024-04-27 | End: 2024-04-28

## 2024-04-27 RX ORDER — ACETAMINOPHEN 500 MG
750 TABLET ORAL ONCE
Refills: 0 | Status: COMPLETED | OUTPATIENT
Start: 2024-04-27 | End: 2024-04-27

## 2024-04-27 RX ORDER — LANOLIN ALCOHOL/MO/W.PET/CERES
3 CREAM (GRAM) TOPICAL AT BEDTIME
Refills: 0 | Status: DISCONTINUED | OUTPATIENT
Start: 2024-04-27 | End: 2024-05-03

## 2024-04-27 RX ORDER — LATANOPROST 0.05 MG/ML
1 SOLUTION/ DROPS OPHTHALMIC; TOPICAL
Qty: 0 | Refills: 0 | DISCHARGE

## 2024-04-27 RX ORDER — SODIUM CHLORIDE 9 MG/ML
500 INJECTION INTRAMUSCULAR; INTRAVENOUS; SUBCUTANEOUS
Refills: 0 | Status: DISCONTINUED | OUTPATIENT
Start: 2024-04-27 | End: 2024-04-27

## 2024-04-27 RX ORDER — PHYTONADIONE (VIT K1) 5 MG
5 TABLET ORAL ONCE
Refills: 0 | Status: COMPLETED | OUTPATIENT
Start: 2024-04-27 | End: 2024-04-27

## 2024-04-27 RX ORDER — ONDANSETRON 8 MG/1
4 TABLET, FILM COATED ORAL ONCE
Refills: 0 | Status: DISCONTINUED | OUTPATIENT
Start: 2024-04-27 | End: 2024-05-03

## 2024-04-27 RX ORDER — ACETAMINOPHEN 500 MG
650 TABLET ORAL ONCE
Refills: 0 | Status: COMPLETED | OUTPATIENT
Start: 2024-04-27 | End: 2024-05-02

## 2024-04-27 RX ORDER — FUROSEMIDE 40 MG
60 TABLET ORAL
Qty: 0 | Refills: 0 | DISCHARGE

## 2024-04-27 RX ADMIN — Medication 300 MILLIGRAM(S): at 09:50

## 2024-04-27 RX ADMIN — CEFEPIME 1000 MILLIGRAM(S): 1 INJECTION, POWDER, FOR SOLUTION INTRAMUSCULAR; INTRAVENOUS at 22:37

## 2024-04-27 RX ADMIN — SODIUM CHLORIDE 75 MILLILITER(S): 9 INJECTION INTRAMUSCULAR; INTRAVENOUS; SUBCUTANEOUS at 22:38

## 2024-04-27 RX ADMIN — SODIUM CHLORIDE 75 MILLILITER(S): 9 INJECTION INTRAMUSCULAR; INTRAVENOUS; SUBCUTANEOUS at 18:46

## 2024-04-27 RX ADMIN — SODIUM CHLORIDE 500 MILLILITER(S): 9 INJECTION INTRAMUSCULAR; INTRAVENOUS; SUBCUTANEOUS at 13:36

## 2024-04-27 RX ADMIN — Medication 5 MILLIGRAM(S): at 18:46

## 2024-04-27 RX ADMIN — SODIUM CHLORIDE 500 MILLILITER(S): 9 INJECTION INTRAMUSCULAR; INTRAVENOUS; SUBCUTANEOUS at 09:50

## 2024-04-27 RX ADMIN — Medication 250 MILLIGRAM(S): at 12:36

## 2024-04-27 RX ADMIN — CEFEPIME 1000 MILLIGRAM(S): 1 INJECTION, POWDER, FOR SOLUTION INTRAMUSCULAR; INTRAVENOUS at 12:36

## 2024-04-27 NOTE — ED PROVIDER NOTE - GASTROINTESTINAL, MLM
Mild diffuse TTP mid-abdomen. Abdomen soft, no guarding. BS hypoactive, normal pitch. Mild diffuse TTP mid-abdomen. Mild diffuse TTP, bi. mid-abdomen. Abdomen soft, no guarding. BS hypoactive, normal pitch.

## 2024-04-27 NOTE — H&P ADULT - NSHPREVIEWOFSYSTEMS_GEN_ALL_CORE
ROS:   Eyes: no changes in vision  ENT/Mouth: no changes    Cardiovascular: no chest pain    Respiratory: no SOB    Gastrointestinal: no diarrhea, no nausea, no vomiting    Genitourinary: no dysuria      Musculoskeletal: low back pain     Integumentary: no itching    Neurological: No Headache, no tremor,    Endocrine: no excessive thirst,     Allergic/Immunologic: no itching

## 2024-04-27 NOTE — PHARMACOTHERAPY INTERVENTION NOTE - COMMENTS
Medication history complete, reviewed medications with patient daughter at bedside and confirmed with doctor jani ledesma hx,

## 2024-04-27 NOTE — ED PROVIDER NOTE - ENMT, MLM
+Poor dentition w/o signs female infection. Airway patent, Nasal mucosa clear. Mouth with mildly dry mucosa. Throat has no vesicles, no oropharyngeal exudates and uvula is midline. +Poor dentition w/o signs of infection. Airway patent, Nasal mucosa clear. Mouth with mildly dry mucosa. Throat has no vesicles, no oropharyngeal exudates and uvula is midline.

## 2024-04-27 NOTE — ED PROVIDER NOTE - CONSTITUTIONAL, MLM
normal... Elderly white female, mildly ill appearing, awake, alert, oriented to person, place, time/situation and in some discomfort d/t pain. Elderly white female, mildly ill-appearing, awake, alert, oriented to person, place, time/situation and in some discomfort d/t pain.

## 2024-04-27 NOTE — ED PROVIDER NOTE - SKIN, MLM
Skin normal color for race, warm, dry and intact. No evidence of rash. Skin normal color for race, warm, dry and intact. No evidence of rash. No tactile warmth

## 2024-04-27 NOTE — ED PROVIDER NOTE - MUSCULOSKELETAL, MLM
B/l para-lumbar TTP, no definite spine TTP w/o stepoff or deformity. Lower posterior rib TTP. Neck non-tender and supple. B/L para-lumbar TTP, no definite spine TTP w/o stepoff or deformity. Lower posterior ribs TTP. Neck non-tender and supple.

## 2024-04-27 NOTE — H&P ADULT - NSHPPHYSICALEXAM_GEN_ALL_CORE
Physical Exam: Vital Signs Last 24 Hrs  T(C): 36.6 (27 Apr 2024 18:10), Max: 36.7 (27 Apr 2024 08:14)  T(F): 97.9 (27 Apr 2024 18:10), Max: 98.1 (27 Apr 2024 11:00)  HR: 37 (27 Apr 2024 18:10) (37 - 52)  BP: 144/50 (27 Apr 2024 18:10) (144/50 - 156/68)  BP(mean): --  RR: 18 (27 Apr 2024 18:10) (18 - 18)  SpO2: 100% (27 Apr 2024 18:10) (98% - 100%)    Parameters below as of 27 Apr 2024 18:10  Patient On (Oxygen Delivery Method): room air            HEENT: PRRL EOMI    MOUTH/TEETH/GUMS: Clear    NECK: no JVD    LUNGS: Clear    HEART: S1,S2 RR    ABDOMEN: soft nontender, mckeon sign neg    EXTREMITIES:  no pedal edema    MUSCULOSKELETAL: no joint swelling     NEURO: no tremor, no focal signs.    SKIN: no rash    : CVA negative,

## 2024-04-27 NOTE — PATIENT PROFILE ADULT - VISION (WITH CORRECTIVE LENSES IF THE PATIENT USUALLY WEARS THEM):
just for reading/Partially impaired: cannot see medication labels or newsprint, but can see obstacles in path, and the surrounding layout; can count fingers at arm's length

## 2024-04-27 NOTE — H&P ADULT - HISTORY OF PRESENT ILLNESS
HPI: the patient is an 88 yo female with Hx. of Atrial fib, on warfarin, CHF,  DJD DERIAN, s/p PPM, chronic low back pain, thrombocytosis   was BIBA form home with c/o abdominal pain , radiating into her low back. The patient symptoms started yesterday and got worse, she also has nausea but no vomiting. The patient daughter is present and states the patient had no appetite. 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     PMHx: Polycythemia , HTN, CAD s/p PCI, AF on Warfarin, PPM for SSS, HLD, chronic CHF, OA/RA on methotrexate, DERIAN, carotid artery disease, thrombocytosis, s/p L THR,     PSHx: PPM x 2,     Family Hx:  not able to provide.     Social Hx.: not smoking, no alcohol use             HPI: the patient is an 88 yo female with Hx. of Atrial fib, on warfarin, CHF,  DJD DERIAN, s/p PPM, chronic low back pain, thrombocytosis   was BIBA form home with c/o abdominal pain , radiating into her low back. The patient symptoms started yesterday and got worse, she also has nausea but no vomiting. The patient daughter is present and states the patient had no appetite. 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     PMHx: thrombocytosis  , HTN, CAD s/p PCI, AF on Warfarin, PPM for SSS, HLD, chronic CHF, OA/RA on methotrexate, DERIAN, carotid artery disease, thrombocytosis, s/p L THR,     PSHx: PPM x 2,     Family Hx:  not able to provide.     Social Hx.: not smoking, no alcohol use             HPI: the patient is an 89 yo female with Hx. of Atrial fib, on warfarin, CHF,  DJD DERIAN, s/p PPM, chronic low back pain, thrombocytosis   was BIBA form home with c/o abdominal pain , radiating into her low back. The patient symptoms started yesterday and got worse, she also has nausea but no vomiting. The patient daughter is present and states the patient had no appetite. 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     PMHx: thrombocytosis  , HTN, CAD s/p PCI, AF on Warfarin, PPM for SSS, HLD, chronic CHF, OA/RA on methotrexate, DERIAN, carotid artery disease, thrombocytosis, s/p L THR,     PSHx: L hip ORIF,  PPM x 2,     Family Hx:  not able to provide.     Social Hx.: not smoking, no alcohol use

## 2024-04-27 NOTE — CONSULT NOTE ADULT - ASSESSMENT
A/P;  89 yo F with abdominal pain  with US: Gallbladder sludge with borderline gallbladder wall thickening and   pericholecystic fluid, concerning for cholecystitis, though no gallstones   are identified. Consider further evaluation with nuclear medicine HIDA     P:  no acute surgical intervention at this time as pt is high risk due to her medical problems  pt needs HIDA scan  If HIDA+, pt need cholecystomy tube  rest of plan as per primary team    plan d/w Dr. Medrano

## 2024-04-27 NOTE — ED PROVIDER NOTE - CARE PLAN
1 Principal Discharge DX:	Acalculous cholecystitis  Secondary Diagnosis:	Supratherapeutic international normalized ratio (INR)  Secondary Diagnosis:	On warfarin for atrial fibrillation

## 2024-04-27 NOTE — ED ADULT NURSE NOTE - OBJECTIVE STATEMENT
Pt ambulatory to ED from home with daughter c/o generalized pain, abdominal pain since yesterday radiating to the back with nausea. Denies fevers, CP or SOB at this time. Pt with dx of osteonecrosis of the jaw reports poor PO intake due to pain in the jaw. MD Davis bedside for assessment. Awaiting orders at this time.

## 2024-04-27 NOTE — ED ADULT TRIAGE NOTE - CHIEF COMPLAINT QUOTE
pt presents o ED with complaints of generalized pain. per daughter, pt reported lower back and lower abdominal pain last night and this morning endorses pain is widespread. recent diagnosis of osteonecrosis of the jaw. associated decreased PO intake.

## 2024-04-27 NOTE — ED ADULT NURSE NOTE - NSFALLHARMRISKINTERV_ED_ALL_ED
Assistance OOB with selected safe patient handling equipment if applicable/Assistance with ambulation/Communicate risk of Fall with Harm to all staff, patient, and family/Monitor gait and stability/Provide visual cue: red socks, yellow wristband, yellow gown, etc/Reinforce activity limits and safety measures with patient and family/Bed in lowest position, wheels locked, appropriate side rails in place/Call bell, personal items and telephone in reach/Instruct patient to call for assistance before getting out of bed/chair/stretcher/Non-slip footwear applied when patient is off stretcher/[place] to call system/Physically safe environment - no spills, clutter or unnecessary equipment/Purposeful Proactive Rounding/Room/bathroom lighting operational, light cord in reach

## 2024-04-27 NOTE — H&P ADULT - ASSESSMENT
88 yo female with Hx. of Atrial fib, on warfarin, CHF,  DJD DERIAN, s/p PPM, chronic low back pain s/p MATTHIAS by Dr. Prakash, thrombocytosis was on hydrea treated by Dr. Arenas,    was BIBA form home with c/o abdominal pain , radiating into her low back. The patient symptoms started yesterday and got worse, she also has nausea but no vomiting. The patient daughter is present and states the patient had no appetite. 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 Seen by surgery in ED recommend HIDA and cholecystostomy drainage by IR if positive.   assessment Dx:                       1. acute cholecystitis suspected                        2. chronic low back pain                        3. Atrial fib.                        4. Elevated INR                       5. SSS s/p PPM                        6. Bradycardia     Plan:    admit to telemetry                medications: vit K PO STAT, d/c warfarin, started on Vanco and Cefepime. , continue meds as per  med rec.                VTEP: on Warfarin                Labs: cbc, bmp, INR,                Radiology:HIDA scan               Cardiac diagnostics: EKG                Consults: EP, surgery ,GI, ID                 Advance Directive: full code,

## 2024-04-27 NOTE — H&P ADULT - NSHPLABSRESULTS_GEN_ALL_CORE
14.2   21.00 )-----------( 615      ( 27 Apr 2024 09:11 )             47.2       04-27    137  |  105  |  42<H>  ----------------------------<  190<H>  4.9   |  18<L>  |  1.28    Ca    10.2<H>      27 Apr 2024 09:11    TPro  7.3  /  Alb  3.8  /  TBili  0.6  /  DBili  x   /  AST  45<H>  /  ALT  17  /  AlkPhos  150<H>  04-27      INR 6.7,     CT abdomen:  Mild bilateral micronodular/tree-in-bud opacities, which may reflect   infectious/inflammatory small airway disease.  No lobar pneumonia.Suggestion of small pericholecystic fluid.  This can be further characterized by right upper quadrant ultrasonography.  Indeterminate nodule right adrenal gland.  Recommend further characterization with nonemergent CT adrenal washout   study.Left hip arthroplasty with possible osteolysis proximal femur.  Recommend comparison with prior postoperative imaging studies.    US abd: Gallbladder sludge with borderline gallbladder wall thickening and   pericholecystic fluid, concerning for cholecystitis, though no gallstones   are identified. Consider further evaluation with nuclear medicine HIDA   scan for confirmation.

## 2024-04-27 NOTE — ED ADULT NURSE REASSESSMENT NOTE - NS ED NURSE REASSESS COMMENT FT1
Pt reports mild improvement in pain s/p Tylenol IV, requesting more analgesia, MD Contino made aware

## 2024-04-27 NOTE — CONSULT NOTE ADULT - SUBJECTIVE AND OBJECTIVE BOX
Surgery Consultation    89y female w/ a PMH of HTN, CAD s/p PCI, AF on Warfarin, PPM for SSS, HLD, chronic CHF, OA/RA on methotrexate, DERIAN, carotid artery disease, thrombocytosis, s/p L THR, recent diagnosis of jaw osteonecrosis (follows w/ oral surgeon), and chronic back pain presents to the ED BIB private car w/ daughter c/o worsening back pain since yesterday w/ associated abdominal pain. Pain appears to originate from mid abdomen, radiating to the back. Pain is aggravated by movement. Endorses associated nausea w/o vomiting or diarrhea. Daughter reports decreased PO intake and is concerned her mother is dehydrated. Denies f/c, specific UE/LE pain, numbness, weakness, tingling, cough, SOB, CP. Upon arrival to ED, patient in NAD w/o active abdominal pain    PAST MEDICAL & SURGICAL HISTORY:  HTN (hypertension)      Afib      Sleep apnea      Hyperlipidemia      Pacemaker      SSS (sick sinus syndrome)      Carotid artery disease      Chronic CHF      Pacemaker        Allergies:  Eliquis (Unknown)  penicillin (Rash)    Home Medications:  allopurinol 100 mg oral tablet: 1 tab(s) orally 2 times a day  ammonium lactate 12% topical lotion: Apply topically to affected area once a day  chlorhexidine 0.12% mucous membrane liquid: 10 milliliter(s) orally 2 times a day *** STOPPED TAKING 3 DAYS AGO***  folic acid 1 mg oral tablet: 1 tab(s) orally once a day  furosemide 20 mg oral tablet: 1 tab(s) orally once a day  losartan 25 mg oral tablet: 1 tab(s) orally once a day  methotrexate 2.5 mg oral tablet: 4 tab(s) orally once a week *** STOPPED TAKING MED A WEEK AGO***  Metoprolol Succinate ER 50 mg oral tablet, extended release: 1 tab(s) orally once a day  montelukast 10 mg oral tablet: 1 tab(s) orally once a day  Multiple Vitamins oral tablet: 1 tab(s) orally once a day  potassium chloride 10 mEq oral tablet, extended release: 1 tab(s) orally once a day  pravastatin 20 mg oral tablet: 1 tab(s) orally once a day (in the evening)  Tylenol 500 mg oral tablet: 2 tab(s) orally every 8 hours as needed for  warfarin 2 mg oral tablet: 1 tab(s) orally once a day  ZyrTEC 10 mg oral tablet: 1 tab(s) orally once a day      Family History:  FAMILY HISTORY:  CAD (coronary artery disease) (Father, Mother)    Family history of breast cancer in sister (Sibling)        ROS:  Constitutional: Denies fever, fatigue or weight loss.  Skin: Denies rash.  Eyes: Denies recent vision problems or eye pain.  ENT: Denies congestion, ear pain, or sore throat.  Endocrine: Denies thyroid problems.  Cardiovascular: Denies chest pain or palpation.  Respiratory: Denies cough, shortness of breath, congestion, or wheezing.  Gastrointestinal: Denies abdominal pain, nausea, vomiting, or diarrhea.  Genitourinary: Denies dysuria.  Musculoskeletal: Denies joint swelling.  Neurologic: Denies headache.      Physical Examination:  PHYSICAL EXAM:  GENERAL: No acute distress, well-developed  HEAD:  Atraumatic, Normocephalic  CHEST/LUNG: CTAB; No wheezes, rales, or rhonchi  HEART: Regular rate and rhythm; No murmurs, rubs, or gallops  ABDOMEN: Soft, non-tender, non-distended  NEUROLOGY: responding appropriately, no focal deficits    Data:                        14.2   21.00 )-----------( 615      ( 2024 09:11 )             47.2         137  |  105  |  42<H>  ----------------------------<  190<H>  4.9   |  18<L>  |  1.28    Ca    10.2<H>      2024 09:11    TPro  7.3  /  Alb  3.8  /  TBili  0.6  /  DBili  x   /  AST  45<H>  /  ALT  17  /  AlkPhos  150<H>        LIVER FUNCTIONS - ( 2024 09:11 )  Alb: 3.8 g/dL / Pro: 7.3 gm/dL / ALK PHOS: 150 U/L / ALT: 17 U/L / AST: 45 U/L / GGT: x           Urinalysis Basic - ( 2024 09:11 )    Color: Yellow / Appearance: Clear / S.018 / pH: x  Gluc: 190 mg/dL / Ketone: 40 mg/dL  / Bili: Negative / Urobili: 0.2 mg/dL   Blood: x / Protein: 30 mg/dL / Nitrite: Negative   Leuk Esterase: Trace / RBC: 1 /HPF / WBC 10 /HPF   Sq Epi: x / Non Sq Epi: 4 /HPF / Bacteria: Negative /HPF        Radiology:     < from: CT Abdomen and Pelvis w/ IV Cont (24 @ 11:01) >  Mild bilateral micronodular/tree-in-bud opacities, which may reflect   infectious/inflammatory small airway disease.  No lobar pneumonia.    Suggestion of small pericholecystic fluid.  This can be further characterized by right upper quadrant ultrasonography.    Indeterminate nodule right adrenal gland.  Recommend further characterization with nonemergent CT adrenal washout   study.    Left hip arthroplasty withpossible osteolysis proximal femur.  Recommend comparison with prior postoperative imaging studies.    < end of copied text >  < from: US Abdomen Upper Quadrant Right (24 @ 14:44) >  Gallbladder sludge with borderline gallbladder wall thickening and   pericholecystic fluid, concerning for cholecystitis, though no gallstones   are identified. Consider further evaluation with nuclear medicine HIDA   scan for confirmation.    < end of copied text >   Surgery Consultation    89y female w/ a PMH of HTN, CAD s/p PCI, AF on Warfarin, PPM for SSS, HLD, chronic CHF, OA/RA on methotrexate, DERIAN, carotid artery disease, thrombocytosis, s/p L THR, recent diagnosis of jaw osteonecrosis (follows w/ oral surgeon), and chronic back pain presents to the ED accompanied by her daughter c/o worsening back pain since yesterday w/ associated lower abdominal pain. As per the daughter the pain is lower abdomen and radiates to back.  Reports nausea denies vomiting. Pt is also having decrease in oral intake due to her jaw necrosis. Denies fever or chills, d/c.  As per ED note upon arrival to ED, patient in NAD w/o active abdominal pain    PAST MEDICAL & SURGICAL HISTORY:  HTN (hypertension)      Afib      Sleep apnea      Hyperlipidemia      Pacemaker      SSS (sick sinus syndrome)      Carotid artery disease      Chronic CHF      Pacemaker        Allergies:  Eliquis (Unknown)  penicillin (Rash)    Home Medications:  allopurinol 100 mg oral tablet: 1 tab(s) orally 2 times a day  ammonium lactate 12% topical lotion: Apply topically to affected area once a day  chlorhexidine 0.12% mucous membrane liquid: 10 milliliter(s) orally 2 times a day *** STOPPED TAKING 3 DAYS AGO***  folic acid 1 mg oral tablet: 1 tab(s) orally once a day  furosemide 20 mg oral tablet: 1 tab(s) orally once a day  losartan 25 mg oral tablet: 1 tab(s) orally once a day  methotrexate 2.5 mg oral tablet: 4 tab(s) orally once a week *** STOPPED TAKING MED A WEEK AGO***  Metoprolol Succinate ER 50 mg oral tablet, extended release: 1 tab(s) orally once a day  montelukast 10 mg oral tablet: 1 tab(s) orally once a day  Multiple Vitamins oral tablet: 1 tab(s) orally once a day  potassium chloride 10 mEq oral tablet, extended release: 1 tab(s) orally once a day  pravastatin 20 mg oral tablet: 1 tab(s) orally once a day (in the evening)  Tylenol 500 mg oral tablet: 2 tab(s) orally every 8 hours as needed for  warfarin 2 mg oral tablet: 1 tab(s) orally once a day  ZyrTEC 10 mg oral tablet: 1 tab(s) orally once a day      Family History:  FAMILY HISTORY:  CAD (coronary artery disease) (Father, Mother)    Family history of breast cancer in sister (Sibling)        ROS:  Constitutional: Denies fever, fatigue or weight loss.  Skin: Denies rash.  Eyes: Denies recent vision problems or eye pain.  ENT: Denies congestion, ear pain, or sore throat.  Endocrine: Denies thyroid problems.  Cardiovascular: Denies chest pain or palpation.  Respiratory: Denies cough, shortness of breath, congestion, or wheezing.  Gastrointestinal: +lower abdominal pain, +nausea, denies vomiting, or diarrhea.  Genitourinary: Denies dysuria.  Musculoskeletal: Denies joint swelling.  Neurologic: Denies headache.      Physical Examination:  Aox3, NAD  GENERAL: No acute distress, well-developed  HEAD:  Atraumatic, Normocephalic  ABDOMEN: Soft, mild TTP lower abdomen/epigastric area, neg Drake and no TTP RUQ  NEUROLOGY: responding appropriately, no focal deficits    Data:                        14.2   21.00 )-----------( 615      ( 2024 09:11 )             47.2         137  |  105  |  42<H>  ----------------------------<  190<H>  4.9   |  18<L>  |  1.28    Ca    10.2<H>      2024 09:11    TPro  7.3  /  Alb  3.8  /  TBili  0.6  /  DBili  x   /  AST  45<H>  /  ALT  17  /  AlkPhos  150<H>        LIVER FUNCTIONS - ( 2024 09:11 )  Alb: 3.8 g/dL / Pro: 7.3 gm/dL / ALK PHOS: 150 U/L / ALT: 17 U/L / AST: 45 U/L / GGT: x           Urinalysis Basic - ( 2024 09:11 )    Color: Yellow / Appearance: Clear / S.018 / pH: x  Gluc: 190 mg/dL / Ketone: 40 mg/dL  / Bili: Negative / Urobili: 0.2 mg/dL   Blood: x / Protein: 30 mg/dL / Nitrite: Negative   Leuk Esterase: Trace / RBC: 1 /HPF / WBC 10 /HPF   Sq Epi: x / Non Sq Epi: 4 /HPF / Bacteria: Negative /HPF        Radiology:     < from: CT Abdomen and Pelvis w/ IV Cont (24 @ 11:01) >  Mild bilateral micronodular/tree-in-bud opacities, which may reflect   infectious/inflammatory small airway disease.  No lobar pneumonia.    Suggestion of small pericholecystic fluid.  This can be further characterized by right upper quadrant ultrasonography.    Indeterminate nodule right adrenal gland.  Recommend further characterization with nonemergent CT adrenal washout   study.    Left hip arthroplasty withpossible osteolysis proximal femur.  Recommend comparison with prior postoperative imaging studies.    < end of copied text >  < from: US Abdomen Upper Quadrant Right (24 @ 14:44) >  Gallbladder sludge with borderline gallbladder wall thickening and   pericholecystic fluid, concerning for cholecystitis, though no gallstones   are identified. Consider further evaluation with nuclear medicine HIDA   scan for confirmation.    < end of copied text >

## 2024-04-27 NOTE — ED PROVIDER NOTE - OBJECTIVE STATEMENT
Pt is a 89y female w/ a PMH of HTN, CAD s/p PCI, AF on Warfarin, PPM for SSS, HLD, chronic CHF, OA/RA on methotrexate, DERIAN, carotid artery disease, thrombocytosis, s/p L THR, recent diagnosis of jaw osteonecrosis (follows w/ oral surgeon), and chronic back pain presents to the ED BIB private car w/ daughter c/o worsening back pain since yesterday w/ associated abdominal pain. Pain appears to originate from mid abdomen, radiating to the back. Pain is aggravated by movement. Endorses associated nausea w/o vomiting or diarrhea. Daughter reports decreased PO intake and is concerned her mother is dehydrated. Denies f/c, specific UE/LE pain, numbness, weakness, tingling, cough, SOB, CP. Upon arrival to ED, patient in NAD w/o active abdominal pain. PCP: Stew. Pt is a 89y female w/ a PMH of HTN, CAD s/p PCI, AF on warfarin, PPM for SSS, HLD, chronic CHF, OA/RA on methotrexate, DERIAN, carotid artery disease, thrombocytosis, s/p L THR, recent diagnosis of jaw osteonecrosis (follows w/ oral surgeon), and chronic back pain, BIB private car w/ daughter to ED c/o worsening back pain since yesterday w/ associated abdominal pain. Pain appears to originate from mid-abdomen, radiating into the back. Pain is aggravated by movement. Endorses associated nausea w/o vomiting nor diarrhea. Daughter reports decreased PO intake and is concerned her mother is dehydrated. Denies F/C, specific UE/LE pain/numbness/weakness/tingling, cough, SOB, CP. Upon arrival to ED, patient in NAD w/o active abdominal pain. PCP: SHAVON Mathias.

## 2024-04-27 NOTE — ED PROVIDER NOTE - CLINICAL SUMMARY MEDICAL DECISION MAKING FREE TEXT BOX
Pt is a 89y female w/ a PMH of HTN, CAD s/p PCI, AF on Warfarin, PPM for SSS, HLD, chronic CHF, OA/RA on methotrexate, DERIAN, carotid artery disease, thrombocytosis, s/p L THR, recent diagnosis of jaw osteonecrosis (follows w/ oral surgeon), and chronic back pain presents to the ED BIB private car w/ daughter c/o worsening back pain since yesterday w/ associated abdominal pain. Pain appears to originate from mid abdomen, radiating to the back.    Plan: EKG, CXR, labs including CBC, CMP, urine w/ culture, UA, lactate, lipase, troponin, IV Ofirmev, IV fluids, CT c/a/p. Monitor, observe, reassess. Pt is a 89y female w/ a PMH of HTN, CAD s/p PCI, AF on Warfarin, PPM for SSS, HLD, chronic CHF, OA/RA on methotrexate, DERIAN, carotid artery disease, thrombocytosis, s/p L THR, recent diagnosis of jaw osteonecrosis (follows w/ oral surgeon), and chronic back pain presents to the ED BIB private car w/ daughter c/o worsening back pain since yesterday w/ associated abdominal pain. Pain appears to originate from mid abdomen, radiating to the back.    Plan: EKG, CXR, labs including CBC, CMP, urine w/ culture, UA, lactate, lipase, troponin, IV Ofirmev, IV fluids, CT c/a/p. Monitor, observe, reassess.    15:40, VICKY Davis MD:  Labs notable for elev. WBC 21 & lactate 2.8 (rpt. lactate s/p IVF normal at 1.6), high INR 6.6 w/o active bleeding noted.  IV Abx administered.  Case d/w admit hospitalist: Med admit held pending CT report.  CT A/P + small pericholecystic fluid ? GB dz.  RUQ abd u/s + indicative of acalculous cholecystitis.  Surgery resident aware of consult.  Case d/w admit hospitalist DR. Weathers who accepts Med admit. Pt is a 89y female w/ a PMH of HTN, CAD s/p PCI, AF on Warfarin, PPM for SSS, HLD, chronic CHF, OA/RA on methotrexate, DERIAN, carotid artery disease, thrombocytosis, s/p L THR, recent diagnosis of jaw osteonecrosis (follows w/ oral surgeon), and chronic back pain presents w/ daughter c/o worsening back pain since yesterday w/ associated abdominal pain. Pain appears to originate from mid-abdomen, radiating into the back.  Exam: Pt ill-appearing, no respiratory distress, + diffuse abd TTP, bi. mid-abd.    Plan: EKG, CXR, labs including CBC, CMP, urine w/ culture, UA, lactate, lipase, troponin, IV Ofirmev, IV fluids, CT C/A/P. Monitor, observe, reassess.    15:40, VICKY Davis MD:  Labs notable for elev. WBC 21 & lactate 2.8 (rpt. lactate s/p IVF normal at 1.6), high INR 6.6 w/o active bleeding noted.  IV Abx administered.  Case d/w admit hospitalist: Med admit held pending CTs report.  CT A/P + small pericholecystic fluid ? GB dz.  RUQ abd u/s + indicative of acalculous cholecystitis.  Surgery resident aware of consult, pt not for Surgery to admit.  Case d/w admit hospitalist Dr. Weathers who accepts Med admit.

## 2024-04-28 LAB
ADD ON TEST-SPECIMEN IN LAB: SIGNIFICANT CHANGE UP
ANION GAP SERPL CALC-SCNC: 8 MMOL/L — SIGNIFICANT CHANGE UP (ref 5–17)
APTT BLD: 81.3 SEC — HIGH (ref 24.5–35.6)
BUN SERPL-MCNC: 35 MG/DL — HIGH (ref 7–23)
CALCIUM SERPL-MCNC: 10.1 MG/DL — SIGNIFICANT CHANGE UP (ref 8.5–10.1)
CHLORIDE SERPL-SCNC: 112 MMOL/L — HIGH (ref 96–108)
CO2 SERPL-SCNC: 23 MMOL/L — SIGNIFICANT CHANGE UP (ref 22–31)
CREAT SERPL-MCNC: 1.18 MG/DL — SIGNIFICANT CHANGE UP (ref 0.5–1.3)
EGFR: 44 ML/MIN/1.73M2 — LOW
GLUCOSE SERPL-MCNC: 114 MG/DL — HIGH (ref 70–99)
HCT VFR BLD CALC: 43.3 % — SIGNIFICANT CHANGE UP (ref 34.5–45)
HGB BLD-MCNC: 13.3 G/DL — SIGNIFICANT CHANGE UP (ref 11.5–15.5)
INR BLD: 4.78 RATIO — HIGH (ref 0.85–1.18)
MCHC RBC-ENTMCNC: 30.1 PG — SIGNIFICANT CHANGE UP (ref 27–34)
MCHC RBC-ENTMCNC: 30.7 GM/DL — LOW (ref 32–36)
MCV RBC AUTO: 98 FL — SIGNIFICANT CHANGE UP (ref 80–100)
PLATELET # BLD AUTO: 470 K/UL — HIGH (ref 150–400)
POTASSIUM SERPL-MCNC: 4.2 MMOL/L — SIGNIFICANT CHANGE UP (ref 3.5–5.3)
POTASSIUM SERPL-SCNC: 4.2 MMOL/L — SIGNIFICANT CHANGE UP (ref 3.5–5.3)
PROTHROM AB SERPL-ACNC: 51.6 SEC — HIGH (ref 9.5–13)
RBC # BLD: 4.42 M/UL — SIGNIFICANT CHANGE UP (ref 3.8–5.2)
RBC # FLD: 20 % — HIGH (ref 10.3–14.5)
SODIUM SERPL-SCNC: 143 MMOL/L — SIGNIFICANT CHANGE UP (ref 135–145)
WBC # BLD: 21.24 K/UL — HIGH (ref 3.8–10.5)
WBC # FLD AUTO: 21.24 K/UL — HIGH (ref 3.8–10.5)

## 2024-04-28 PROCEDURE — 99221 1ST HOSP IP/OBS SF/LOW 40: CPT

## 2024-04-28 PROCEDURE — 99233 SBSQ HOSP IP/OBS HIGH 50: CPT

## 2024-04-28 RX ORDER — CEFTRIAXONE 500 MG/1
2000 INJECTION, POWDER, FOR SOLUTION INTRAMUSCULAR; INTRAVENOUS EVERY 24 HOURS
Refills: 0 | Status: DISCONTINUED | OUTPATIENT
Start: 2024-04-28 | End: 2024-05-03

## 2024-04-28 RX ORDER — FUROSEMIDE 40 MG
20 TABLET ORAL DAILY
Refills: 0 | Status: DISCONTINUED | OUTPATIENT
Start: 2024-04-28 | End: 2024-04-28

## 2024-04-28 RX ORDER — LOSARTAN POTASSIUM 100 MG/1
25 TABLET, FILM COATED ORAL DAILY
Refills: 0 | Status: DISCONTINUED | OUTPATIENT
Start: 2024-04-28 | End: 2024-05-03

## 2024-04-28 RX ORDER — METRONIDAZOLE 500 MG
500 TABLET ORAL EVERY 12 HOURS
Refills: 0 | Status: DISCONTINUED | OUTPATIENT
Start: 2024-04-28 | End: 2024-05-03

## 2024-04-28 RX ORDER — SODIUM CHLORIDE 9 MG/ML
1000 INJECTION, SOLUTION INTRAVENOUS
Refills: 0 | Status: DISCONTINUED | OUTPATIENT
Start: 2024-04-28 | End: 2024-05-03

## 2024-04-28 RX ORDER — METOPROLOL TARTRATE 50 MG
50 TABLET ORAL DAILY
Refills: 0 | Status: DISCONTINUED | OUTPATIENT
Start: 2024-04-28 | End: 2024-05-03

## 2024-04-28 RX ADMIN — SODIUM CHLORIDE 75 MILLILITER(S): 9 INJECTION, SOLUTION INTRAVENOUS at 16:36

## 2024-04-28 RX ADMIN — Medication 100 MILLIGRAM(S): at 10:51

## 2024-04-28 RX ADMIN — Medication 100 MILLIGRAM(S): at 22:03

## 2024-04-28 RX ADMIN — CEFTRIAXONE 2000 MILLIGRAM(S): 500 INJECTION, POWDER, FOR SOLUTION INTRAMUSCULAR; INTRAVENOUS at 10:52

## 2024-04-28 NOTE — PROGRESS NOTE ADULT - ASSESSMENT
89 yo female with Hx. of Atrial fib, on warfarin, CHF,  DJD DERIAN, s/p PPM, chronic low back pain, thrombocytosis   was BIBA form home with c/o abdominal pain , radiating into her low back. The patient symptoms started yesterday and got worse, she also has nausea but no vomiting. The patient daughter is present and states the patient had no appetite. 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       #Sepsis, PoA, Abdominal pain with nausea/vomiting, possible acute acalculous cholecystitis   -CT abdomen/Pelvis: as above   -US abdomen: as above   -f/u on HIDA  -IV fluids   -d/c Cefepime   -Rocephin and Flagyl   -f/u on cultures   -GI eval appreciated   -Surgery eval noted  -IR consult for possible cholecystostomy tube placement   -ID following     #CAD s/p PCI   -Metoprolol Succinate   -Losartan   -hold Statin     #Chronic Afib on Coumadin, Suprathereputic INR  -s/p 1x dose of Vitamin K   -INR: 4.78  -will reverse INR for any planned intervention   -Hold coumadin   -Metoprolol Succinate     #SSS s/p PPM placement   -EP eval for PPM interrogation     #HTN  -c/w Losartan   -c/w Metoprolol     #HFpEF  -on Lasix 20mg, hold for now     #Essential Thrombocytosis   -on hydroxyurea  -Continue to hold      #Ulcerative Gingivitis with possible osteonecrosis   -denies being on bisphonates   -f/u as outpateint     #VTE Prophylaxis   -SCDs,              88 yo female with Hx. of Atrial fib, on warfarin, CHF,  DJD DERIAN, s/p PPM, chronic low back pain, thrombocytosis   was BIBA form home with c/o abdominal pain , radiating into her low back. The patient symptoms started yesterday and got worse, she also has nausea but no vomiting. The patient daughter is present and states the patient had no appetite. 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       #Sepsis, PoA, Abdominal pain with nausea/vomiting, possible acute acalculous cholecystitis   -CT abdomen/Pelvis: as above   -US abdomen: as above   -f/u on HIDA  -IV fluids   -d/c Cefepime   -Rocephin and Flagyl   -f/u on cultures   -GI eval appreciated   -Surgery eval noted  -IR consult for possible cholecystostomy tube placement   -ID following     #CAD s/p PCI   -Metoprolol Succinate   -Losartan   -hold Statin     #Chronic Afib on Coumadin, Suprathereputic INR  -s/p 1x dose of Vitamin K   -INR: 4.78  -will reverse INR for any planned intervention   -Hold coumadin   -Metoprolol Succinate     #SSS s/p PPM placement   -EP eval for PPM interrogation     #HTN  -c/w Losartan   -c/w Metoprolol     #HFpEF  -on Lasix 20mg, hold for now     #Essential Thrombocytosis   -on hydroxyurea  -Continue to hold      #Ulcerative Gingivitis with possible osteonecrosis   -denies being on bisphonates   -f/u as outpateint     #VTE Prophylaxis   -SCDs,     #Advanced Care Directives   -DNR/DNI with trial NIPPV  -MOSLT in the chart     discussed with daughter

## 2024-04-28 NOTE — CONSULT NOTE ADULT - ASSESSMENT
88 y/o female with h/o Atrial fib, on warfarin, CHF, DJD DERIAN, s/p PPM, chronic low back pain, thrombocytosis was admitted on 4/27 form home for worsening abdominal pain, radiating into her low back. The patient symptoms started the day PTA and got worse, she also has nausea but no vomiting. The patient stated the he had no appetite. 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 In ER she received cefepime.     1. Acute cholecystitis. Allergy to PCN.   -leukocytosis  -obtain BC x 2, urine c/s  -start ceftriaxone 2 gm IV qd and metronidazole 500 mg IV q12h  -reason for abx use and side effects reviewed with patient; monitor BMP  -monitor closely in madhuri of PCN allergy history   -surgical evaluation  -monitor abdomen  -old chart reviewed to assess prior cultures  -monitor temps  -f/u CBC  -supportive care  2. Other issues:   -care per medicine    Clinical team may change from intravenous to oral antibiotics when the following criteria are met:   1. Patient is clinically improving/stable       a)	Improved signs and symptoms of infection from initial presentation       b)	Afebrile for 24 hours       c)	Leukocytosis trending towards normal range   2. Patient is tolerating oral intake   3. Initial/repeat blood cultures are negative    When above criteria met may change iv antibiotics to an appropriate oral agent  Cannot advise changing to oral antibiotic therapy until culture sensitivity is available.       88 y/o female with h/o Atrial fib, on warfarin, CHF, DJD DERIAN, s/p PPM, chronic low back pain, thrombocytosis was admitted on 4/27 form home for worsening abdominal pain, radiating into her low back. The patient symptoms started the day PTA and got worse, she also has nausea but no vomiting. The patient stated the he had no appetite. 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 In ER she received cefepime.     1. Acute cholecystitis. Allergy to PCN.   -leukocytosis  -obtain BC x 2, urine c/s  -start ceftriaxone 2 gm IV qd and metronidazole 500 mg IV q12h  -reason for abx use and side effects reviewed with patient; monitor BMP  -monitor closely in madhuri of PCN allergy history   -surgical evaluation  -for HIDA scan  -monitor abdomen  -old chart reviewed to assess prior cultures  -monitor temps  -f/u CBC  -supportive care  2. Other issues:   -care per medicine    Clinical team may change from intravenous to oral antibiotics when the following criteria are met:   1. Patient is clinically improving/stable       a)	Improved signs and symptoms of infection from initial presentation       b)	Afebrile for 24 hours       c)	Leukocytosis trending towards normal range   2. Patient is tolerating oral intake   3. Initial/repeat blood cultures are negative    When above criteria met may change iv antibiotics to an appropriate oral agent  Cannot advise changing to oral antibiotic therapy until culture sensitivity is available.

## 2024-04-28 NOTE — DIETITIAN INITIAL EVALUATION ADULT - DIET TYPE
Advance diet when medically feasible to no MNT, texture as per SLP or determined by nursing and patient/supplement (specify)

## 2024-04-28 NOTE — DIETITIAN INITIAL EVALUATION ADULT - PERTINENT MEDS FT
MEDICATIONS  (STANDING):  cefepime  Injectable. 1000 milliGRAM(s) IV Push every 12 hours  sodium chloride 0.9%. 1000 milliLiter(s) (75 mL/Hr) IV Continuous <Continuous>    MEDICATIONS  (PRN):  acetaminophen     Tablet .. 650 milliGRAM(s) Oral once PRN Temp greater or equal to 38C (100.4F), Mild Pain (1 - 3)  aluminum hydroxide/magnesium hydroxide/simethicone Suspension 30 milliLiter(s) Oral every 4 hours PRN Dyspepsia  melatonin 3 milliGRAM(s) Oral at bedtime PRN Insomnia  morphine  - Injectable 2 milliGRAM(s) IV Push once PRN Moderate Pain (4 - 6)  ondansetron Injectable 4 milliGRAM(s) IV Push once PRN Nausea and/or Vomiting

## 2024-04-28 NOTE — DIETITIAN INITIAL EVALUATION ADULT - ADD RECOMMEND
Advance diet when medically feasible to no MNT, texture as per SLP or as determined by patient and nursing  Record PO intake in EMR after each meal (nursing.)   MVI w/ minerals daily to ensure 100% RDA met   Consider adding thiamine 100 mg daily 2/2 poor PO intake/ malnutrition   Monitor bowel movements, if no BM for >3 days, consider implementing bowel regimen.  suggest Confirm Goals of Care regarding nutrition support   Consider adding appetite stimulant such as Remeron or Marinol 2/2 chronically poor appetite/ PO intake   A target glucose range of 140-180 mg/dL is recommended for most critically ill and non-critically ill patients.   Check HgbA1c  Monitor PO intake, tolerance, labs and weight.

## 2024-04-28 NOTE — DIETITIAN INITIAL EVALUATION ADULT - NAME AND PHONE
Shila Jones RDN, CDN, [facility]      [phone]   [email or web address]

## 2024-04-28 NOTE — DIETITIAN INITIAL EVALUATION ADULT - OTHER INFO
HPI: the patient is an 88 yo female with Hx. of Atrial fib, on warfarin, CHF,  DJD DERIAN, s/p PPM, chronic low back pain, thrombocytosis   was BIBA form home with c/o abdominal pain , radiating into her low back. The patient symptoms started yesterday and got worse, she also has nausea but no vomiting. The patient daughter is present and states the patient had no appetite. 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     Admit dx is cholecystitis  Pt was in her bed on interview  RD bedscale wt is 49 kg  108#  NFPE reveals severe muscle wasting and fat wasting  Pt has elevated BG, suggest check HgbA1c   A target glucose range of 140-180 mg/dL is recommended for most critically ill and non-critically ill patients.   Confirm Goals of Care regarding nutrition support   Consider adding appetite stimulant such as Remeron or Marinol 2/2 chronically poor appetite/ PO intake   Recommendations to follow in Plan/Intervention

## 2024-04-28 NOTE — DIETITIAN INITIAL EVALUATION ADULT - ETIOLOGY
r/t inability to consume sufficient energy/protein 2/2 loss of appetite and pain due to cholecystitis, osteonecrosis, chronic CHF

## 2024-04-28 NOTE — DIETITIAN INITIAL EVALUATION ADULT - ENTER TO (CAL/KG)
40 Mauc Instructions: By selecting yes to the question below the MAUC number will be added into the note.  This will be calculated automatically based on the diagnosis chosen, the size entered, the body zone selected (H,M,L) and the specific indications you chose. You will also have the option to override the Mohs AUC if you disagree with the automatically calculated number and this option is found in the Case Summary tab.

## 2024-04-28 NOTE — CONSULT NOTE ADULT - SUBJECTIVE AND OBJECTIVE BOX
Patient is a 89y old  Female who presents with a chief complaint of abdominal pain , cholecystitis (28 Apr 2024 09:46)      HPI:  HPI: the patient is an 88 yo female with Hx. of Atrial fib, on warfarin, CHF,  DJD DERIAN, s/p PPM, chronic low back pain, Essential thrombocytosis previously on hydroxyurea but currently off hydrea due to normal platelet count.  she was also on methotrexate for RA which was also recently stopped due to ulcerative gingivitis with possible osteonecrosis of jaw  BIBA form home with c/o abdominal pain , radiating into her low back. The patient symptoms started yesterday and got worse, she also has nausea but no vomiting. The patient daughter is present and states the patient had no appetite. 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     diagnosed with acalculous cholecystitis    PMHx: thrombocytosis  , HTN, CAD s/p PCI, AF on Warfarin, PPM for SSS, HLD, chronic CHF, OA/RA on methotrexate, DERIAN, carotid artery disease, thrombocytosis, s/p L THR,     PSHx: L hip ORIF,  PPM x 2,     Family Hx:  not able to provide.     Social Hx.: not smoking, no alcohol use    INR 6 on admission       (27 Apr 2024 18:12)      PAST MEDICAL & SURGICAL HISTORY:  HTN (hypertension)      Afib      Sleep apnea      Hyperlipidemia      Pacemaker      SSS (sick sinus syndrome)      Carotid artery disease      Chronic CHF      Pacemaker          REVIEW OF SYSTEMS    General:	  confused  denies pain   no bleeding  no fever    Allergies    Eliquis (Unknown)  penicillin (Rash)    Intolerances          FAMILY HISTORY:  CAD (coronary artery disease) (Father, Mother)    Family history of breast cancer in sister (Sibling)        Home Medications:  allopurinol 100 mg oral tablet: 1 tab(s) orally 2 times a day (27 Apr 2024 11:51)  ammonium lactate 12% topical lotion: Apply topically to affected area once a day (27 Apr 2024 16:18)  chlorhexidine 0.12% mucous membrane liquid: 10 milliliter(s) orally 2 times a day *** STOPPED TAKING 3 DAYS AGO*** (27 Apr 2024 16:18)  folic acid 1 mg oral tablet: 1 tab(s) orally once a day (27 Apr 2024 11:51)  furosemide 20 mg oral tablet: 1 tab(s) orally once a day (27 Apr 2024 16:18)  losartan 25 mg oral tablet: 1 tab(s) orally once a day (27 Apr 2024 11:51)  methotrexate 2.5 mg oral tablet: 4 tab(s) orally once a week *** STOPPED TAKING MED A WEEK AGO*** (27 Apr 2024 16:18)  Metoprolol Succinate ER 50 mg oral tablet, extended release: 1 tab(s) orally once a day (27 Apr 2024 11:51)  montelukast 10 mg oral tablet: 1 tab(s) orally once a day (27 Apr 2024 11:51)  Multiple Vitamins oral tablet: 1 tab(s) orally once a day (27 Apr 2024 11:51)  potassium chloride 10 mEq oral tablet, extended release: 1 tab(s) orally once a day (27 Apr 2024 16:18)  pravastatin 20 mg oral tablet: 1 tab(s) orally once a day (in the evening) (27 Apr 2024 16:18)  Tylenol 500 mg oral tablet: 2 tab(s) orally every 8 hours as needed for (27 Apr 2024 16:18)  warfarin 2 mg oral tablet: 1 tab(s) orally once a day (27 Apr 2024 16:18)  ZyrTEC 10 mg oral tablet: 1 tab(s) orally once a day (27 Apr 2024 16:18)      MEDICATIONS  (STANDING):  cefTRIAXone Injectable. 2000 milliGRAM(s) IV Push every 24 hours  metroNIDAZOLE  IVPB 500 milliGRAM(s) IV Intermittent every 12 hours  sodium chloride 0.9%. 1000 milliLiter(s) (75 mL/Hr) IV Continuous <Continuous>    MEDICATIONS  (PRN):  acetaminophen     Tablet .. 650 milliGRAM(s) Oral once PRN Temp greater or equal to 38C (100.4F), Mild Pain (1 - 3)  aluminum hydroxide/magnesium hydroxide/simethicone Suspension 30 milliLiter(s) Oral every 4 hours PRN Dyspepsia  melatonin 3 milliGRAM(s) Oral at bedtime PRN Insomnia  morphine  - Injectable 2 milliGRAM(s) IV Push once PRN Moderate Pain (4 - 6)  ondansetron Injectable 4 milliGRAM(s) IV Push once PRN Nausea and/or Vomiting      PHYSICAL EXAM:  Vital Signs Last 24 Hrs  T(C): 36.8 (28 Apr 2024 08:52), Max: 36.8 (27 Apr 2024 21:45)  T(F): 98.3 (28 Apr 2024 08:52), Max: 98.3 (28 Apr 2024 08:52)  HR: 60 (28 Apr 2024 08:52) (37 - 75)  BP: 144/57 (28 Apr 2024 08:52) (138/49 - 149/65)  BP(mean): --  RR: 17 (28 Apr 2024 08:52) (17 - 19)  SpO2: 95% (28 Apr 2024 08:52) (95% - 100%)    Parameters below as of 28 Apr 2024 08:52  Patient On (Oxygen Delivery Method): room air      Gen:  frail  poor dentition  no abdominal tenderness  confused      LABS:                        13.3   21.24 )-----------( 470      ( 28 Apr 2024 07:27 )             43.3     28 Apr 2024 07:27    143    |  112    |  35     ----------------------------<  114    4.2     |  23     |  1.18     Ca    10.1       28 Apr 2024 07:27    TPro  7.3    /  Alb  3.8    /  TBili  0.6    /  DBili  x      /  AST  45     /  ALT  17     /  AlkPhos  150    27 Apr 2024 09:11    LIVER FUNCTIONS - ( 27 Apr 2024 09:11 )  Alb: 3.8 g/dL / Pro: 7.3 gm/dL / ALK PHOS: 150 U/L / ALT: 17 U/L / AST: 45 U/L / GGT: x           PT/INR - ( 28 Apr 2024 07:27 )   PT: 51.6 sec;   INR: 4.78 ratio         PTT - ( 28 Apr 2024 07:27 )  PTT:81.3 sec      RADIOLOGY & ADDITIONAL STUDIES:      ACC: [id number] EXAM:  CT ABDOMEN AND PELVIS IC   ORDERED BY: YUE CALDERÓN     ACC: [id number] EXAM:  CT CHEST IC   ORDERED BY: YUE CALDERÓN     PROCEDURE DATE:  04/27/2024          INTERPRETATION:  CLINICAL INFORMATION: Chest pain.  Mid abdominal pain radiating to back.    COMPARISON: CT scan chest 11/23/2021.    CONTRAST/COMPLICATIONS:  IV Contrast: Omnipaque 350 (accession [id number]), IV contrast documented   in unlinked concurrent exam (accession [id number])  90 cc administered   0   cc discarded  Oral Contrast: NONE  Complications: None reported at time of study completion    PROCEDURE:  CT of the Chest, Abdomen and Pelvis was performed.  Sagittal and coronal reformats were performed.    FINDINGS:    CHEST:    LUNGS AND LARGE AIRWAYS: PLEURA:    There are mild bilateral micronodular tree-in-bud nodular opacities,   which may reflect inflammatory/infectious small airway disease.  No lobar lung consolidation.  The central airways are patent.    No pleural effusion or pneumothorax.    VESSELS: Atherosclerotic changes thoracic aorta and coronary artery   calcifications and/or stents.    HEART:  Cardiomegaly.  Cardiac pacemaker leads.   No pericardial effusion.    MEDIASTINUM AND PRITI:  Subcentimeter short axis precarinal lymph node.    CHEST WALL AND LOWER NECK:  Heterogeneous enlarged left lobe of the thyroid gland with substernal   extension displacing the trachea to the right, similar to prior exam.  Right cardiac chest wall device.    ABDOMEN AND PELVIS:    LIVER:  Subcentimeter hypodense lesion left hepatic lobe is too small for   characterization.  BILE DUCTS: Normal caliber.  GALLBLADDER: Small pericholecystic fluid.  SPLEEN: Within normal limits.  PANCREAS: Within normal limits.  ADRENALS: 1.5 cm low-density nodule medial limb right adrenal gland.  KIDNEYS/URETERS: Within normal limits.    Metallic streak artifact related to patient's left hip arthroplasty   degrades image quality limiting evaluation in the pelvis.    BLADDER: Unremarkable as visualized.  REPRODUCTIVE ORGANS: Small calcified uterine fibroids.    BOWEL:  No bowel obstruction.  Appendix is normal.  PERITONEUM: No ascites.    VESSELS: Atherosclerotic changes; abdominal aorta is normal in caliber.  RETROPERITONEUM/LYMPH NODES: No lymphadenopathy.    ABDOMINAL WALL: Within normal limits.    BONES:  Degenerative changes spine.  Age indeterminate compression deformities at L3, L4 and L5 vertebral   bodies.  Partially imaged is left total hip arthroplasty.

## 2024-04-28 NOTE — CONSULT NOTE ADULT - SUBJECTIVE AND OBJECTIVE BOX
Patient is a 89y old  Female who presents with a chief complaint of abdominal pain , cholecystitis (2024 08:37)    HPI:  89 y/o female with h/o Atrial fib, on warfarin, CHF, DJD DERIAN, s/p PPM, chronic low back pain, thrombocytosis was admitted on  form home for worsening abdominal pain, radiating into her low back. The patient symptoms started the day PTA and got worse, she also has nausea but no vomiting. The patient stated the he had no appetite. 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 In ER she received cefepime.     PMHx: thrombocytosis, HTN, CAD s/p PCI, AF on Warfarin, PPM for SSS, HLD, chronic CHF, OA/RA on methotrexate, DERIAN, carotid artery disease, thrombocytosis, s/p L THR,   PSHx: L hip ORIF,  PPM x 2     Meds: per reconciliation sheet, noted below  MEDICATIONS  (STANDING):  cefepime  Injectable. 1000 milliGRAM(s) IV Push every 12 hours  sodium chloride 0.9%. 1000 milliLiter(s) (75 mL/Hr) IV Continuous <Continuous>    MEDICATIONS  (PRN):  acetaminophen     Tablet .. 650 milliGRAM(s) Oral once PRN Temp greater or equal to 38C (100.4F), Mild Pain (1 - 3)  aluminum hydroxide/magnesium hydroxide/simethicone Suspension 30 milliLiter(s) Oral every 4 hours PRN Dyspepsia  melatonin 3 milliGRAM(s) Oral at bedtime PRN Insomnia  morphine  - Injectable 2 milliGRAM(s) IV Push once PRN Moderate Pain (4 - 6)  ondansetron Injectable 4 milliGRAM(s) IV Push once PRN Nausea and/or Vomiting    Allergies    Eliquis (Unknown)  penicillin (Rash)    Intolerances      Social: no smoking, no alcohol, no illegal drugs; no recent travel, no exposure to TB  FAMILY HISTORY:  CAD (coronary artery disease) (Father, Mother)  Family history of breast cancer in sister (Sibling)    ROS: the patient denies fever, no chills, no HA, no seizures, no dizziness, no sore throat, no nasal congestion, no blurry vision, no CP, no palpitations, no SOB, no cough, has abdominal pain, no diarrhea, no N/V, no dysuria, no leg pain, no claudication, no rash, no joint aches, no rectal pain or bleeding, no night sweats  All other systems reviewed and are negative    Vital Signs Last 24 Hrs  T(C): 36.8 (2024 08:52), Max: 36.8 (2024 21:45)  T(F): 98.3 (2024 08:52), Max: 98.3 (2024 08:52)  HR: 60 (2024 08:52) (37 - 75)  BP: 144/57 (2024 08:52) (138/49 - 149/65)  BP(mean): --  RR: 17 (2024 08:52) (17 - 19)  SpO2: 95% (2024 08:52) (95% - 100%)    Parameters below as of 2024 08:52  Patient On (Oxygen Delivery Method): room air      Daily Height in cm: 160.02 (2024 18:10)    Daily Weight in k.5 (2024 05:56)    PE:    Constitutional:  No acute distress  HEENT: NC/AT, EOMI, PERRLA, conjunctivae clear; ears and nose atraumatic; pharynx benign  Neck: supple; thyroid not palpable  Back: no tenderness  Respiratory: respiratory effort normal; clear to auscultation  Cardiovascular: S1S2 regular, no murmurs  Abdomen: soft, RUQ tender, not distended, positive BS; no liver or spleen organomegaly  Genitourinary: no suprapubic tenderness  Lymphatic: no LN palpable  Musculoskeletal: no muscle tenderness, no joint swelling or tenderness  Extremities: no pedal edema  Neurological/ Psychiatric: AxOx3, judgement and insight normal; moving all extremities  Skin: no rashes; no palpable lesions    Labs: all available labs reviewed                        13.3   21.24 )-----------( 470      ( 2024 07:27 )             43.3         143  |  112<H>  |  35<H>  ----------------------------<  114<H>  4.2   |  23  |  1.18    Ca    10.1      2024 07:27    TPro  7.3  /  Alb  3.8  /  TBili  0.6  /  DBili  x   /  AST  45<H>  /  ALT  17  /  AlkPhos  150<H>       LIVER FUNCTIONS - ( 2024 09:11 )  Alb: 3.8 g/dL / Pro: 7.3 gm/dL / ALK PHOS: 150 U/L / ALT: 17 U/L / AST: 45 U/L / GGT: x           Radiology: all available radiological tests reviewed    < from: US Abdomen Upper Quadrant Right (24 @ 14:44) >  Gallbladder sludge with borderline gallbladder wall thickening and pericholecystic fluid, concerning for cholecystitis, though no gallstones   are identified. Consider further evaluation with nuclear medicine HIDA scan for confirmation.  < end of copied text >    < from: CT Abdomen and Pelvis w/ IV Cont (24 @ 11:01) >  Mild bilateral micronodular/tree-in-bud opacities, which may reflect   infectious/inflammatory small airway disease.  No lobar pneumonia.  Suggestion of small pericholecystic fluid.  This can be further characterized by right upper quadrant ultrasonography.  Indeterminate nodule right adrenal gland.  Recommend further characterization with nonemergent CT adrenal washout study.  Left hip arthroplasty with possible osteolysis proximal femur.  Recommend comparison with prior postoperative imaging studies.  < end of copied text >    Advanced directives addressed: full resuscitation

## 2024-04-28 NOTE — CONSULT NOTE ADULT - ASSESSMENT
full note to follow  pt seen and examined  likely achalculous cholecysititis  no role for ERCP, defer to surgery for recs but would likely benefit from IR evaluation for choly tube 89 year old woman with afib on coumadin, CHF, pacemaker, osteonecrosis of jaw admitted with abdominal pain.     White count 20k, LFT's essentially normal, with non dilated duct. No role for EUS or MRCP or ERCP.   Distended GB wall and some pericholecystic fluid with sludge.   Pain improved, ?passed sluge or stone, or better on abx.   Could be  acalculous cholecysititis vs cholecystitis from unseen stone.   No role for ERCP, defer to surgery for recs as this seems to be not a GI issue in terms of cbd, but would likely benefit from IR evaluation for choly tube or a HIDA scan. D/w primary team.

## 2024-04-28 NOTE — DIETITIAN INITIAL EVALUATION ADULT - PERTINENT LABORATORY DATA
04-27    137  |  105  |  42<H>  ----------------------------<  190<H>  4.9   |  18<L>  |  1.28    Ca    10.2<H>      27 Apr 2024 09:11    TPro  7.3  /  Alb  3.8  /  TBili  0.6  /  DBili  x   /  AST  45<H>  /  ALT  17  /  AlkPhos  150<H>  04-27

## 2024-04-28 NOTE — CONSULT NOTE ADULT - ASSESSMENT
1. essential thrombocytosis  platelets reasonably controlled  hold off on hyrea for now    2. cholecystitis  possible drain    3. 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 hyperbaric oxygen as OP

## 2024-04-28 NOTE — CONSULT NOTE ADULT - SUBJECTIVE AND OBJECTIVE BOX
1Patient is a 89y old  Female who presents with a chief complaint of Cholecystitis     (28 Apr 2024 07:23)      HPI:  HPI: the patient is an 89 yo female with Hx. of Atrial fib, on warfarin, CHF,  DJD DERIAN, s/p PPM, chronic low back pain, thrombocytosis   was BIBA form home with c/o abdominal pain , radiating into her low back. The patient symptoms started yesterday and got worse, she also has nausea but no vomiting. The patient daughter is present and states the patient had no appetite. 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     FULL NOTE TO FOLLOW    PMHx: thrombocytosis  , HTN, CAD s/p PCI, AF on Warfarin, PPM for SSS, HLD, chronic CHF, OA/RA on methotrexate, DERIAN, carotid artery disease, thrombocytosis, s/p L THR,     PSHx: L hip ORIF,  PPM x 2,     Family Hx:  not able to provide.     Social Hx.: not smoking, no alcohol use             (27 Apr 2024 18:12)      PAST MEDICAL & SURGICAL HISTORY:  HTN (hypertension)      Afib      Sleep apnea      Hyperlipidemia      Pacemaker      SSS (sick sinus syndrome)      Carotid artery disease      Chronic CHF      Pacemaker          MEDICATIONS  (STANDING):  cefepime  Injectable. 1000 milliGRAM(s) IV Push every 12 hours  sodium chloride 0.9%. 1000 milliLiter(s) (75 mL/Hr) IV Continuous <Continuous>    MEDICATIONS  (PRN):  acetaminophen     Tablet .. 650 milliGRAM(s) Oral once PRN Temp greater or equal to 38C (100.4F), Mild Pain (1 - 3)  aluminum hydroxide/magnesium hydroxide/simethicone Suspension 30 milliLiter(s) Oral every 4 hours PRN Dyspepsia  melatonin 3 milliGRAM(s) Oral at bedtime PRN Insomnia  morphine  - Injectable 2 milliGRAM(s) IV Push once PRN Moderate Pain (4 - 6)  ondansetron Injectable 4 milliGRAM(s) IV Push once PRN Nausea and/or Vomiting      Allergies    Eliquis (Unknown)  penicillin (Rash)    Intolerances        SOCIAL HISTORY:    FAMILY HISTORY:  CAD (coronary artery disease) (Father, Mother)    Family history of breast cancer in sister (Sibling)        REVIEW OF SYSTEMS:    CONSTITUTIONAL: No weakness, fevers or chills  EYES/ENT: No visual changes;  No vertigo or throat pain   NECK: No pain or stiffness  RESPIRATORY: No cough, wheezing, hemoptysis; No shortness of breath  CARDIOVASCULAR: No chest pain or palpitations  GASTROINTESTINAL: No abdominal or epigastric pain. No nausea, vomiting, or hematemesis; No diarrhea or constipation. No melena or hematochezia.  GENITOURINARY: No dysuria, frequency or hematuria  NEUROLOGICAL: No numbness or weakness  SKIN: No itching, burning, rashes, or lesions   PSYCH: Normal mood and affect  All other review of systems is negative unless indicated above.    Vital Signs Last 24 Hrs  T(C): 36.8 (27 Apr 2024 21:45), Max: 36.8 (27 Apr 2024 21:45)  T(F): 98.2 (27 Apr 2024 21:45), Max: 98.2 (27 Apr 2024 21:45)  HR: 75 (27 Apr 2024 21:45) (37 - 75)  BP: 138/49 (27 Apr 2024 21:45) (138/49 - 149/65)  BP(mean): --  RR: 19 (27 Apr 2024 21:45) (18 - 19)  SpO2: 100% (27 Apr 2024 21:45) (98% - 100%)    Parameters below as of 27 Apr 2024 21:45  Patient On (Oxygen Delivery Method): room air        PHYSICAL EXAM:    Constitutional: No acute distress, well-developed, non-toxic appearing  HEENT: masked, good phonation, not icteric  Neck: supple, no lymphadenopathy  Respiratory: clear to ascultation bilaterally, no wheezing  Cardiovascular: S1 and S2, regular rate and rhythm, no murmurs rubs or gallops  Gastrointestinal: soft, non-tender, non-distended, +bowel sounds, no rebound or guarding, no surgical scars, no drains  Extremities: No peripheral edema, no cyanosis or clubbing  Vascular: 2+ peripheral pulses, no venous stasis  Neurological: A/O x 3, no focal deficits, no asterixis  Psychiatric: Normal mood, normal affect  Skin: No rashes, not jaundiced    LABS:                        13.3   21.24 )-----------( 470      ( 28 Apr 2024 07:27 )             43.3     04-28    143  |  112<H>  |  35<H>  ----------------------------<  114<H>  4.2   |  23  |  1.18    Ca    10.1      28 Apr 2024 07:27    TPro  7.3  /  Alb  3.8  /  TBili  0.6  /  DBili  x   /  AST  45<H>  /  ALT  17  /  AlkPhos  150<H>  04-27    PT/INR - ( 28 Apr 2024 07:27 )   PT: 51.6 sec;   INR: 4.78 ratio         PTT - ( 28 Apr 2024 07:27 )  PTT:81.3 sec  LIVER FUNCTIONS - ( 27 Apr 2024 09:11 )  Alb: 3.8 g/dL / Pro: 7.3 gm/dL / ALK PHOS: 150 U/L / ALT: 17 U/L / AST: 45 U/L / GGT: x             RADIOLOGY & ADDITIONAL STUDIES: Patient is a 89y old  Female who presents with a chief complaint of Cholecystitis     (28 Apr 2024 07:23)    89 year old woman with afib on coumadin, CHF, pacemaker, osteonecrosis of jaw admitted with abdominal pain.     Patient states she had severe abdominal pain yesterday. Sudden onset. radiated to back. with nuasea but no vomiting. States she has improved symptoms today. Was seen by surgery.     PAST MEDICAL & SURGICAL HISTORY:  HTN (hypertension)      Afib      Sleep apnea      Hyperlipidemia      Pacemaker      SSS (sick sinus syndrome)      Carotid artery disease      Chronic CHF      Pacemaker      MEDICATIONS  (STANDING):  cefepime  Injectable. 1000 milliGRAM(s) IV Push every 12 hours  sodium chloride 0.9%. 1000 milliLiter(s) (75 mL/Hr) IV Continuous <Continuous>    MEDICATIONS  (PRN):  acetaminophen     Tablet .. 650 milliGRAM(s) Oral once PRN Temp greater or equal to 38C (100.4F), Mild Pain (1 - 3)  aluminum hydroxide/magnesium hydroxide/simethicone Suspension 30 milliLiter(s) Oral every 4 hours PRN Dyspepsia  melatonin 3 milliGRAM(s) Oral at bedtime PRN Insomnia  morphine  - Injectable 2 milliGRAM(s) IV Push once PRN Moderate Pain (4 - 6)  ondansetron Injectable 4 milliGRAM(s) IV Push once PRN Nausea and/or Vomiting      Allergies    Eliquis (Unknown)  penicillin (Rash)    Intolerances    SOCIAL HISTORY:  lives at home    FAMILY HISTORY:  CAD (coronary artery disease) (Father, Mother)    Family history of breast cancer in sister (Sibling)        REVIEW OF SYSTEMS:    CONSTITUTIONAL: No weakness, fevers or chills  EYES/ENT: No visual changes;  No vertigo or throat pain   NECK: No pain or stiffness  RESPIRATORY: No cough, wheezing, hemoptysis; No shortness of breath  CARDIOVASCULAR: No chest pain or palpitations  GASTROINTESTINAL: see HPI  GENITOURINARY: No dysuria, frequency or hematuria  NEUROLOGICAL: No numbness or weakness  SKIN: No itching, burning, rashes, or lesions   PSYCH: Normal mood and affect  All other review of systems is negative unless indicated above.    Vital Signs Last 24 Hrs  T(C): 36.8 (27 Apr 2024 21:45), Max: 36.8 (27 Apr 2024 21:45)  T(F): 98.2 (27 Apr 2024 21:45), Max: 98.2 (27 Apr 2024 21:45)  HR: 75 (27 Apr 2024 21:45) (37 - 75)  BP: 138/49 (27 Apr 2024 21:45) (138/49 - 149/65)  BP(mean): --  RR: 19 (27 Apr 2024 21:45) (18 - 19)  SpO2: 100% (27 Apr 2024 21:45) (98% - 100%)    Parameters below as of 27 Apr 2024 21:45  Patient On (Oxygen Delivery Method): room air        PHYSICAL EXAM:    Constitutional: No acute distress, pleasant, non-toxic appearing, lying in bed  HEENT: unmasked, good phonation, not icteric  Neck: supple, no lymphadenopathy  Respiratory: clear to ascultation bilaterally, no wheezing  Cardiovascular: S1 and S2, regular rate and rhythm, no murmurs rubs or gallops  Gastrointestinal: soft, mildly tender to deep palpation in epigastric region, non-distended, +bowel sounds, no rebound or guarding,  Extremities: No peripheral edema, no cyanosis or clubbing  Vascular: 2+ peripheral pulses, no venous stasis  Neurological:  no focal deficits, no asterixis  Psychiatric: Normal mood, normal affect  Skin: No rashes, not jaundiced    LABS:                        13.3   21.24 )-----------( 470      ( 28 Apr 2024 07:27 )             43.3     04-28    143  |  112<H>  |  35<H>  ----------------------------<  114<H>  4.2   |  23  |  1.18    Ca    10.1      28 Apr 2024 07:27    TPro  7.3  /  Alb  3.8  /  TBili  0.6  /  DBili  x   /  AST  45<H>  /  ALT  17  /  AlkPhos  150<H>  04-27    PT/INR - ( 28 Apr 2024 07:27 )   PT: 51.6 sec;   INR: 4.78 ratio         PTT - ( 28 Apr 2024 07:27 )  PTT:81.3 sec  LIVER FUNCTIONS - ( 27 Apr 2024 09:11 )  Alb: 3.8 g/dL / Pro: 7.3 gm/dL / ALK PHOS: 150 U/L / ALT: 17 U/L / AST: 45 U/L / GGT: x             RADIOLOGY & ADDITIONAL STUDIES: U/S with GB wall thickening, pericholecystic fluid, sludge, non dilated cbd Consent 1/Introductory Paragraph: The rationale for Mohs was explained to the patient and consent was obtained. The risks, benefits and alternatives to therapy were discussed in detail. Specifically, the risks of infection, scarring, bleeding, prolonged wound healing, incomplete removal, allergy to anesthesia, nerve injury and recurrence were addressed. Prior to the procedure, the treatment site was clearly identified and confirmed by the patient. All components of Universal Protocol/PAUSE Rule completed.

## 2024-04-28 NOTE — DIETITIAN INITIAL EVALUATION ADULT - NSFNSPHYEXAMSKINFT_GEN_A_CORE
Pressure Injury 1: none, none  Pressure Injury 2: Bilateral:, heel, Stage I  Pressure Injury 3: none, none  Pressure Injury 4: none, none  Pressure Injury 5: none, none  Pressure Injury 6: none, none  Pressure Injury 7: none, none  Pressure Injury 8: none, none  Pressure Injury 9: none, none  Pressure Injury 10: none, none  Pressure Injury 11: none, none, Pressure Injury 1: Bilateral:, BUTTOCKS, Stage I  Pressure Injury 2: none, none  Pressure Injury 3: none, none  Pressure Injury 4: none, none  Pressure Injury 5: none, none  Pressure Injury 6: none, none  Pressure Injury 7: none, none  Pressure Injury 8: none, none  Pressure Injury 9: none, none  Pressure Injury 10: none, none  Pressure Injury 11: none, none Pressure Injury 2: Bilateral:, heel, Stage I  Pressure Injury 1: Bilateral:, BUTTOCKS, Stage I  Ayden 15

## 2024-04-29 DIAGNOSIS — I50.9 HEART FAILURE, UNSPECIFIED: ICD-10-CM

## 2024-04-29 LAB
ALBUMIN SERPL ELPH-MCNC: 2.9 G/DL — LOW (ref 3.3–5)
ALP SERPL-CCNC: 111 U/L — SIGNIFICANT CHANGE UP (ref 40–120)
ALT FLD-CCNC: 9 U/L — LOW (ref 12–78)
ANION GAP SERPL CALC-SCNC: 7 MMOL/L — SIGNIFICANT CHANGE UP (ref 5–17)
APTT BLD: 56.4 SEC — HIGH (ref 24.5–35.6)
AST SERPL-CCNC: 28 U/L — SIGNIFICANT CHANGE UP (ref 15–37)
BASOPHILS # BLD AUTO: 0.08 K/UL — SIGNIFICANT CHANGE UP (ref 0–0.2)
BASOPHILS NFR BLD AUTO: 0.4 % — SIGNIFICANT CHANGE UP (ref 0–2)
BILIRUB SERPL-MCNC: 0.7 MG/DL — SIGNIFICANT CHANGE UP (ref 0.2–1.2)
BLD GP AB SCN SERPL QL: SIGNIFICANT CHANGE UP
BUN SERPL-MCNC: 33 MG/DL — HIGH (ref 7–23)
CALCIUM SERPL-MCNC: 9.7 MG/DL — SIGNIFICANT CHANGE UP (ref 8.5–10.1)
CHLORIDE SERPL-SCNC: 117 MMOL/L — HIGH (ref 96–108)
CO2 SERPL-SCNC: 22 MMOL/L — SIGNIFICANT CHANGE UP (ref 22–31)
CREAT SERPL-MCNC: 0.99 MG/DL — SIGNIFICANT CHANGE UP (ref 0.5–1.3)
EGFR: 54 ML/MIN/1.73M2 — LOW
EOSINOPHIL # BLD AUTO: 0.03 K/UL — SIGNIFICANT CHANGE UP (ref 0–0.5)
EOSINOPHIL NFR BLD AUTO: 0.2 % — SIGNIFICANT CHANGE UP (ref 0–6)
GLUCOSE SERPL-MCNC: 113 MG/DL — HIGH (ref 70–99)
HCT VFR BLD CALC: 40.5 % — SIGNIFICANT CHANGE UP (ref 34.5–45)
HGB BLD-MCNC: 12.7 G/DL — SIGNIFICANT CHANGE UP (ref 11.5–15.5)
IMM GRANULOCYTES NFR BLD AUTO: 1.1 % — HIGH (ref 0–0.9)
INR BLD: 3 RATIO — HIGH (ref 0.85–1.18)
LYMPHOCYTES # BLD AUTO: 0.75 K/UL — LOW (ref 1–3.3)
LYMPHOCYTES # BLD AUTO: 3.8 % — LOW (ref 13–44)
MCHC RBC-ENTMCNC: 30.6 PG — SIGNIFICANT CHANGE UP (ref 27–34)
MCHC RBC-ENTMCNC: 31.4 GM/DL — LOW (ref 32–36)
MCV RBC AUTO: 97.6 FL — SIGNIFICANT CHANGE UP (ref 80–100)
MONOCYTES # BLD AUTO: 1.25 K/UL — HIGH (ref 0–0.9)
MONOCYTES NFR BLD AUTO: 6.4 % — SIGNIFICANT CHANGE UP (ref 2–14)
NEUTROPHILS # BLD AUTO: 17.33 K/UL — HIGH (ref 1.8–7.4)
NEUTROPHILS NFR BLD AUTO: 88.1 % — HIGH (ref 43–77)
PLATELET # BLD AUTO: 424 K/UL — HIGH (ref 150–400)
POTASSIUM SERPL-MCNC: 3.6 MMOL/L — SIGNIFICANT CHANGE UP (ref 3.5–5.3)
POTASSIUM SERPL-SCNC: 3.6 MMOL/L — SIGNIFICANT CHANGE UP (ref 3.5–5.3)
PROT SERPL-MCNC: 5.7 GM/DL — LOW (ref 6–8.3)
PROTHROM AB SERPL-ACNC: 32.8 SEC — HIGH (ref 9.5–13)
RBC # BLD: 4.15 M/UL — SIGNIFICANT CHANGE UP (ref 3.8–5.2)
RBC # FLD: 20.4 % — HIGH (ref 10.3–14.5)
SODIUM SERPL-SCNC: 146 MMOL/L — HIGH (ref 135–145)
WBC # BLD: 19.65 K/UL — HIGH (ref 3.8–10.5)
WBC # FLD AUTO: 19.65 K/UL — HIGH (ref 3.8–10.5)

## 2024-04-29 PROCEDURE — 78226 HEPATOBILIARY SYSTEM IMAGING: CPT | Mod: 26

## 2024-04-29 PROCEDURE — 99497 ADVNCD CARE PLAN 30 MIN: CPT | Mod: 25

## 2024-04-29 PROCEDURE — 99233 SBSQ HOSP IP/OBS HIGH 50: CPT

## 2024-04-29 PROCEDURE — 99223 1ST HOSP IP/OBS HIGH 75: CPT

## 2024-04-29 PROCEDURE — 99498 ADVNCD CARE PLAN ADDL 30 MIN: CPT

## 2024-04-29 RX ADMIN — Medication 100 MILLIGRAM(S): at 09:45

## 2024-04-29 RX ADMIN — Medication 100 MILLIGRAM(S): at 22:27

## 2024-04-29 RX ADMIN — CEFTRIAXONE 2000 MILLIGRAM(S): 500 INJECTION, POWDER, FOR SOLUTION INTRAMUSCULAR; INTRAVENOUS at 09:45

## 2024-04-29 RX ADMIN — Medication 50 MILLIGRAM(S): at 09:49

## 2024-04-29 RX ADMIN — LOSARTAN POTASSIUM 25 MILLIGRAM(S): 100 TABLET, FILM COATED ORAL at 09:46

## 2024-04-29 NOTE — GOALS OF CARE CONVERSATION - ADVANCED CARE PLANNING - CONVERSATION DETAILS
HPI: As per medical record,    the patient is an 88 yo female with Hx. of Atrial fib, on warfarin, CHF,  DJD DERIAN, s/p PPM, chronic low back pain, thrombocytosis   was BIBA form home with c/o abdominal pain , radiating into her low back. The patient symptoms started yesterday and got worse, she also has nausea but no vomiting. The patient daughter is present and states the patient had no appetite. 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  (27 Apr 2024 18:12)      PERTINENT PMH REVIEWED:  [ x ] YES [ ] NO           Primary Contact: daughter Marialuisa ([phone])    HCP [ x ] Surrogate [   ] Guardian [   ]    Mental Status: [  ] Alert  [  ] Oriented [  ] Confused [  ] Lethargic   Concerns of Depression [  ]  Anxiety [   ]  Baseline ADLs (prior to admission):  Independent [ ] moderately [ ] fully   Dependent   [ ] moderately [ ]fully    Family Meeting attendees:    Anticipated Grief: Patient[  ] Family [  ]    Caregiver [place] Assessed: Yes [  ] No [  ]    Islam:    Spiritual Concerns:    Goals of Care: Comfort [  ] Rehabilitation [  ] Curative [  ] Life Prolonging [  ]    Previous Services:    ADVANCE DIRECTIVES:  [ x ] YES [ ] NO    - Health Care Proxy on chart naming daughter Marialuisa HIGGINS on chart reflecting DNR/DNI with trial NIV    Anticipated D/C Plan: Home - [facility] vs Home Hospice                     Summary:    Daughter inquired about hyperbaric O2 chamber to treat the jaw -- reportedly recommended by Dr. Arenas.    **INCOMPLETE** HPI: As per medical record,   The patient is an 88 yo female with Hx. of Atrial fib, on warfarin, CHF,  DJD DERIAN, s/p PPM, chronic low back pain, thrombocytosis   was BIBA form home with c/o abdominal pain , radiating into her low back. The patient symptoms started yesterday and got worse, she also has nausea but no vomiting. The patient daughter is present and states the patient had no appetite. 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  (27 Apr 2024 18:12)      PERTINENT PMH REVIEWED:  [ x ] YES [ ] NO           Primary Contact: daughter Marialuisa ([phone])    HCP [ x ] Surrogate [   ] Guardian [   ]    Mental Status: Alert, oriented with pleasant mood, some forgetfulness   Concerns of Depression [  ] none reported  Anxiety [   ] none reported  Baseline ADLs (prior to admission):  Independent [ x ] moderately [ ] fully   Dependent   [ ] moderately [ ]fully    Family Meeting attendees: Pt's daughter Marialuisa participated in discussion.     Anticipated Grief: Patient[  ] Family [ x ]    Caregiver [place] Assessed: Yes [ x ] No [  ]    Taoism: Nondenominational     Spiritual Concerns: Family requested  visit.  Gideon aware.     Goals of Care: Ongoing discussion - family considering home hospice    Previous Services:    ADVANCE DIRECTIVES:  [ x ] YES [ ] NO    - Health Care Proxy on chart naming evonne Elam   - GISELA on chart reflecting DNR/DNI with trial NIV    Anticipated D/C Plan: Home - [facility] vs Home Hospice                     Summary:    Daughter inquired about hyperbaric O2 chamber to treat the jaw -- reportedly recommended by Dr. Arenas.    **INCOMPLETE** HPI: As per medical record,   The patient is an 89 yo female with Hx. of Atrial fib, on warfarin, CHF,  DJD DERIAN, s/p PPM, chronic low back pain, thrombocytosis   was BIBA form home with c/o abdominal pain , radiating into her low back. The patient symptoms started yesterday and got worse, she also has nausea but no vomiting. The patient daughter is present and states the patient had no appetite. 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  (27 Apr 2024 18:12)      PERTINENT PMH REVIEWED:  [ x ] YES [ ] NO           Primary Contact: daughter Marialuisa ([phone])    HCP [ x ] Surrogate [   ] Guardian [   ]    Mental Status: Alert, oriented with pleasant mood, some forgetfulness   Concerns of Depression [  ] none reported  Anxiety [   ] none reported  Baseline ADLs (prior to admission):  Independent [ x ] moderately [ ] fully   Dependent   [ ] moderately [ ]fully    Family Meeting attendees: Pt's daughter Marialuisa participated in discussion.     Anticipated Grief: Patient[  ] Family [ x ]    Caregiver [place] Assessed: Yes [ x ] No [  ]    Confucianism: Jew     Spiritual Concerns: Family requested  visit.  Gideon aware.     Goals of Care: Ongoing discussion - family considering home hospice    Previous Services:    ADVANCE DIRECTIVES:  [ x ] YES [ ] NO    - Health Care Proxy on chart naming daughter Marialuisa   - GISELA on chart reflecting DNR/DNI with trial NIV    Anticipated D/C Plan: Home - [facility] vs Home Hospice                     Summary: Palliative SW met with patient and pt's daughter Marialuisa to introduce team and offer support. Palliative roles explained. Pt appeared alert, oriented with pleasant mood and some forgetfulness. She reports some pain in her abdomen. With pt's permission, this SW & daughter met in another room to discuss goals of care. Marialuisa reports that pt resides home with her and Marialuisa's . Pt is  -lost  approx 20 years ago. Daughter Marialuisa shares that pt has always been sick, and was not supposed to live past the age of 30 - reports heart block at 10 yrs old. She shares that pt had a pacemaker placed many years ago and it continues to work. She shares that pt has had 2 cardiac arrests in the past, both times successfully resuscitated.     Daughter Marialuisa reports that pt went to the dentist to have some teeth pulled and after doing that, dentist had to have collagen strip placed in the hopes the gums could regenerate and fill the hole. At her followup procedure, pt was immediately referred to an oral surgeon who diagnosed her with osteonecrosis and started her on various treatments including antibiotics. Marialuisa feels like this has been the beginning of the issues to follow. Pt has been having trouble eating and having lack of appetite.     SW inquired about patient's wishes. Daughter acknowledges completing MOLST reflecting DNR/DNI with trial NIV with Dr. Solis. She explains that she does not want her mother to suffer but would consider treatments such as hyperbaric O2, to treat her mother's jaw. Daughter inquired about hyperbaric O2 chamber which was reportedly recommended by Dr. Arenas.    SW reviewed different options including hospice. Hospice philosophy explained. Marialuisa shares that she has dealt with hospice for a family member in the past. She is considering this option for her mother but would like to take things one day at a time. Her first choice would be [facility] due to location of inpatient facility. Daughter does not want pt to go to [facility].    Emotional support provided. Plan to see how patient does over the next few days. Our team will continue to follow. HPI: As per medical record,   The patient is an 89 yo female with Hx. of Atrial fib, on warfarin, CHF,  DJD DERIAN, s/p PPM, chronic low back pain, thrombocytosis   was BIBA form home with c/o abdominal pain , radiating into her low back. The patient symptoms started yesterday and got worse, she also has nausea but no vomiting. The patient daughter is present and states the patient had no appetite. 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  (27 Apr 2024 18:12)      PERTINENT PMH REVIEWED:  [ x ] YES [ ] NO           Primary Contact: daughter Marialuisa ([phone])    HCP [ x ] Surrogate [   ] Guardian [   ]    Mental Status: Alert, oriented with pleasant mood, some forgetfulness   Concerns of Depression [  ] none reported  Anxiety [   ] none reported  Baseline ADLs (prior to admission):  Independent [ x ] moderately [ ] fully   Dependent   [ ] moderately [ ]fully   - "self sufficient", daughter prepares meals    Family Meeting attendees: Pt's daughter Marialuisa participated in discussion.     Anticipated Grief: Patient[  ] Family [ x ]    Caregiver [place] Assessed: Yes [ x ] No [  ]    Faith: Pentecostalism     Spiritual Concerns: Family requested  visit.  Gideon aware.     Goals of Care: Ongoing discussion - family considering home hospice    Previous Services:    ADVANCE DIRECTIVES:  [ x ] YES [ ] NO    - Health Care Proxy on chart naming evonne HIGGINS on chart reflecting DNR/DNI with trial NIV    Anticipated D/C Plan: Home - [facility] vs Home Hospice                     Summary: Palliative SW met with patient and pt's daughter Marialuisa to introduce team and offer support. Palliative roles explained. Pt appeared alert, oriented with pleasant mood and some forgetfulness. She reports some pain in her abdomen. With pt's permission, this SW & daughter met in another room to discuss goals of care. Marialuisa reports that pt resides home with her and Marialuisa's . Pt is  -lost  approx 20 years ago. Daughter Marialuisa shares that pt has always been sick, and was not supposed to live past the age of 30 - reports heart block at 10 yrs old. She shares that pt had a pacemaker placed many years ago and it continues to work. She shares that pt has had 2 cardiac arrests in the past, both times successfully resuscitated.     Daughter Marialuisa reports that pt went to the dentist to have some teeth pulled and after doing that, dentist had to have collagen strip placed in the hopes the gums could regenerate and fill the hole. At her followup procedure, pt was immediately referred to an oral surgeon who diagnosed her with osteonecrosis and started her on various treatments including antibiotics. Marialuisa feels like this has been the beginning of the issues to follow. Pt has been having trouble eating and having lack of appetite.     SW inquired about patient's wishes. Daughter acknowledges completing MOLST reflecting DNR/DNI with trial NIV with Dr. Solis. She explains that she does not want her mother to suffer but would consider treatments such as hyperbaric O2, to treat her mother's jaw. Daughter inquired about hyperbaric O2 chamber which was reportedly recommended by Dr. Arenas.    SW reviewed different options including hospice. Hospice philosophy explained. Marialuisa shares that she has dealt with hospice for a family member in the past. She is considering this option for her mother but would like to take things one day at a time. Her first choice would be [facility] due to location of inpatient facility. Daughter does not want pt to go to [facility].    Emotional support provided. Plan to see how patient does over the next few days. Our team will continue to follow.

## 2024-04-29 NOTE — PROGRESS NOTE ADULT - ASSESSMENT
1. essential thrombocytosis  platelets reasonably controlled  hold off on hyrea for now  plts continue to be stable    2. possible cholecystitis  hida negative  ir likely no intervention      3. 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 hyperbaric oxygen as OP      Thank you for the courtesy of this consultation and we will continue to follow.    Jesse Woodard MD  [facility]  Cell: [phone]

## 2024-04-29 NOTE — SWALLOW BEDSIDE ASSESSMENT ADULT - SWALLOW EVAL: CRITERIA FOR SKILLED INTERVENTION MET
DO NOT FEEL THAT ACUTE SPEECH PATHOLOGY FOLLOW UP WOULD CHANGE CLINICAL MANAGEMENT/OUTCOME IN HOSPITAL SETTING. NO ORAL MOTOR FOCALITY EVIDENT ON EXAM. PHARYNGEAL INTEGRITY IS FUNCTIONAL FOR AGE. NO PRIMARY SPEECH-LANGUAGE PATHOLOGY WAS DEMONSTRATED DURING TESTING. TRADITIONAL ST WOULD NOT BE OF CLINICAL BENEFIT, GIVEN ABOVE, THIS SERVICE WILL NOT ACTIVELY FOLLOW. RECONSULT PRN SHOULD STATUS CHANGE AND CONDITION WARRANT.

## 2024-04-29 NOTE — CONSULT NOTE ADULT - SUBJECTIVE AND OBJECTIVE BOX
Patient is a 89y old  Female who presents with a chief complaint of abdominal pain , cholecystitis (29 Apr 2024 18:52)    ________________________________  ISA ARAMBULA is a 89y year old Female with a past medical history of f chronic atrial fibrillation anticoagulation on warfarin (previously could not tolerate Eliquis and declined Xarelto), history of moderate mitral regurgitation, sick sinus syndrome status post dual chamber pacemaker with abandoned lead in the right side chest wall, high cholesterol, coronary artery disease status post cardiac catheterization Fabry 2018 at that time she had a 30% proximal LAD and 95% proximal RCA she is status post drug-eluting stent to RCA, obstructive sleep apnea, she has moderate mitral regurgitation, moderate pulmonary hypertension, arthritis and essential thrombocytosis. She also has osteonecrosis of the jaw and has lost weight.    She now presents for abdominal pain , radiating into her low back. The patient symptoms started yesterday and got worse, she also has nausea but no vomiting. The patient daughter is present and states the patient had no appetite.       Admitted for coleocystitis HIDA neg. INR elevated but improved. No CP or SOB>     ________________________________  Review of systems: A 10 point review of system has been performed, and is negative except for what has been mentioned in the above history of present illness.     PAST MEDICAL & SURGICAL HISTORY:  HTN (hypertension)      Afib      Sleep apnea      Hyperlipidemia      Pacemaker      SSS (sick sinus syndrome)      Carotid artery disease      Chronic CHF      Pacemaker        FAMILY HISTORY:  CAD (coronary artery disease) (Father, Mother)    Family history of breast cancer in sister (Sibling)         SOCIAL HISTORY: The patient denies any tobacco abuse, alcohol abuse or illicit drug use.    ALLERGIES:  Eliquis (Unknown)  penicillin (Rash)    Home Medications:  allopurinol 100 mg oral tablet: 1 tab(s) orally 2 times a day (27 Apr 2024 11:51)  ammonium lactate 12% topical lotion: Apply topically to affected area once a day (27 Apr 2024 16:18)  chlorhexidine 0.12% mucous membrane liquid: 10 milliliter(s) orally 2 times a day *** STOPPED TAKING 3 DAYS AGO*** (27 Apr 2024 16:18)  folic acid 1 mg oral tablet: 1 tab(s) orally once a day (27 Apr 2024 11:51)  furosemide 20 mg oral tablet: 1 tab(s) orally once a day (27 Apr 2024 16:18)  losartan 25 mg oral tablet: 1 tab(s) orally once a day (27 Apr 2024 11:51)  methotrexate 2.5 mg oral tablet: 4 tab(s) orally once a week *** STOPPED TAKING MED A WEEK AGO*** (27 Apr 2024 16:18)  Metoprolol Succinate ER 50 mg oral tablet, extended release: 1 tab(s) orally once a day (27 Apr 2024 11:51)  montelukast 10 mg oral tablet: 1 tab(s) orally once a day (27 Apr 2024 11:51)  Multiple Vitamins oral tablet: 1 tab(s) orally once a day (27 Apr 2024 11:51)  potassium chloride 10 mEq oral tablet, extended release: 1 tab(s) orally once a day (27 Apr 2024 16:18)  pravastatin 20 mg oral tablet: 1 tab(s) orally once a day (in the evening) (27 Apr 2024 16:18)  Tylenol 500 mg oral tablet: 2 tab(s) orally every 8 hours as needed for (27 Apr 2024 16:18)  warfarin 2 mg oral tablet: 1 tab(s) orally once a day (27 Apr 2024 16:18)  ZyrTEC 10 mg oral tablet: 1 tab(s) orally once a day (27 Apr 2024 16:18)    MEDICATIONS  (STANDING):  cefTRIAXone Injectable. 2000 milliGRAM(s) IV Push every 24 hours  lactated ringers. 1000 milliLiter(s) (75 mL/Hr) IV Continuous <Continuous>  losartan 25 milliGRAM(s) Oral daily  metoprolol succinate ER 50 milliGRAM(s) Oral daily  metroNIDAZOLE  IVPB 500 milliGRAM(s) IV Intermittent every 12 hours    MEDICATIONS  (PRN):  acetaminophen     Tablet .. 650 milliGRAM(s) Oral once PRN Temp greater or equal to 38C (100.4F), Mild Pain (1 - 3)  aluminum hydroxide/magnesium hydroxide/simethicone Suspension 30 milliLiter(s) Oral every 4 hours PRN Dyspepsia  melatonin 3 milliGRAM(s) Oral at bedtime PRN Insomnia  ondansetron Injectable 4 milliGRAM(s) IV Push once PRN Nausea and/or Vomiting    Vital Signs Last 24 Hrs  T(C): 36.6 (29 Apr 2024 20:01), Max: 36.9 (29 Apr 2024 07:50)  T(F): 97.9 (29 Apr 2024 20:01), Max: 98.4 (29 Apr 2024 07:50)  HR: 60 (29 Apr 2024 20:01) (59 - 60)  BP: 133/58 (29 Apr 2024 20:01) (133/58 - 143/64)  BP(mean): --  RR: 18 (29 Apr 2024 20:01) (18 - 18)  SpO2: 99% (29 Apr 2024 20:01) (98% - 99%)    Parameters below as of 29 Apr 2024 20:01  Patient On (Oxygen Delivery Method): room air      I&O's Summary    29 Apr 2024 07:01  -  29 Apr 2024 22:27  --------------------------------------------------------  IN: 900 mL / OUT: 0 mL / NET: 900 mL      ________________________________  GENERAL APPEARANCE:  No acute distress  HEAD: normocephalic, atraumatic  NECK: supple, no jugular venous distention, no carotid bruit    HEART: Regular rate and rhythm, S1, S2 normal, 1/6 murmur    CHEST:  No anterior chest wall tenderness    LUNGS:  Clear to auscultation, without any wheezing, rhonchi or rales    ABDOMEN: soft, nontender, nondistended, with positive bowel sounds appreciated  EXTREMITIES: no edema.   NEURO: Alert and oriented x3  PSYC:  Normal affect  SKIN:  Dry  ________________________________   ECG: Junc rhythm w/ V pacing, underlying Afib    LABS:                        12.7   19.65 )-----------( 424      ( 29 Apr 2024 06:33 )             40.5             04-29    146<H>  |  117<H>  |  33<H>  ----------------------------<  113<H>  3.6   |  22  |  0.99    Ca    9.7      29 Apr 2024 06:33    TPro  5.7<L>  /  Alb  2.9<L>  /  TBili  0.7  /  DBili  x   /  AST  28  /  ALT  9<L>  /  AlkPhos  111  04-29      LIVER FUNCTIONS - ( 29 Apr 2024 06:33 )  Alb: 2.9 g/dL / Pro: 5.7 gm/dL / ALK PHOS: 111 U/L / ALT: 9 U/L / AST: 28 U/L / GGT: x         PT/INR - ( 29 Apr 2024 06:33 )   PT: 32.8 sec;   INR: 3.00 ratio         PTT - ( 29 Apr 2024 06:33 )  PTT:56.4 sec    04-27 @ 09:11  Trop-I  --  CK      88  CK-MB   --  Urinalysis Basic - ( 29 Apr 2024 06:33 )    Color: x / Appearance: x / SG: x / pH: x  Gluc: 113 mg/dL / Ketone: x  / Bili: x / Urobili: x   Blood: x / Protein: x / Nitrite: x   Leuk Esterase: x / RBC: x / WBC x   Sq Epi: x / Non Sq Epi: x / Bacteria: x        PT/INR - ( 29 Apr 2024 06:33 )   PT: 32.8 sec;   INR: 3.00 ratio         PTT - ( 29 Apr 2024 06:33 )  PTT:56.4 sec  Urinalysis Basic - ( 29 Apr 2024 06:33 )    Color: x / Appearance: x / SG: x / pH: x  Gluc: 113 mg/dL / Ketone: x  / Bili: x / Urobili: x   Blood: x / Protein: x / Nitrite: x   Leuk Esterase: x / RBC: x / WBC x   Sq Epi: x / Non Sq Epi: x / Bacteria: x             ________________________________    RADIOLOGY & ADDITIONAL STUDIES: IMPRESSION: Normal hepatobiliary scan.    No evidence of acute cholecystitis or biliary obstruction.    IMPRESSION:    Mild bilateral micronodular/tree-in-bud opacities, which may reflect   infectious/inflammatory small airway disease.  No lobar pneumonia.    Suggestion of small pericholecystic fluid.  This can be further characterized by right upper quadrant ultrasonography.    Indeterminate nodule right adrenal gland.  Recommend further characterization with nonemergent CT adrenal washout   study.    Left hip arthroplasty withpossible osteolysis proximal femur.  Recommend comparison with prior postoperative imaging studies.  ________________________________    ASSESSMENT:  Coronary disease status post PCI in 2018 to the RCA  Chronic atrial fibrillation on anticoagulation  Elevated INR, likely due to dec oral intake.  Cholecystitis   Sick sinus syndrome status post dual-chamber pacemaker in the right side, with a abandon pacemaker lead in the left chest wall  Moderate mitral valve regurgitation  Moderate pulmonary hypertension  Osteonecrosis of jaw    PLAN:  Cont BB  Cont losartan  HIDA neg, no plan for IR intervention.  Hold warfarin - will likely have to adjust dose on DC. s/p Vit K  dw dtr  ____________________________________________  (Dragon Dictation software used). Thank you for allowing me to participate in the care of your patient. Please contact me should any questions arise.    PIOTR Daugherty DO, [facility]  Office: [phone]

## 2024-04-29 NOTE — CONSULT NOTE ADULT - SUBJECTIVE AND OBJECTIVE BOX
HPI:  HPI: the patient is an 88 yo female with Hx. of Atrial fib, on warfarin, CHF,  DJD DERIAN, s/p PPM, chronic low back pain, thrombocytosis   was BIBA form home with c/o abdominal pain , radiating into her low back. The patient symptoms started yesterday and got worse, she also has nausea but no vomiting. The patient daughter is present and states the patient had no appetite. 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     PMHx: thrombocytosis  , HTN, CAD s/p PCI, AF on Warfarin, PPM for SSS, HLD, chronic CHF, OA/RA on methotrexate, DERIAN, carotid artery disease, thrombocytosis, s/p L THR,     PSHx: L hip ORIF,  PPM x 2,     Family Hx:  not able to provide.     Social Hx.: not smoking, no alcohol use  (27 Apr 2024 18:12)    INTERVENTIONAL RADIOLOGY CONSULT:  89F with PMHx of Afib (on coumadin), CHF, DERIAN, s/p PPM, chronic back pain, thrombocytosis presenting with abdominal pain. US with concerns for acute cholecystitis. IR consulted for percutaneous cholecystostomy.    PAST MEDICAL & SURGICAL HISTORY:  HTN (hypertension)      Afib      Sleep apnea      Hyperlipidemia      Pacemaker      SSS (sick sinus syndrome)      Carotid artery disease      Chronic CHF      Pacemaker    MEDICATIONS  (STANDING):  cefTRIAXone Injectable. 2000 milliGRAM(s) IV Push every 24 hours  lactated ringers. 1000 milliLiter(s) (75 mL/Hr) IV Continuous <Continuous>  losartan 25 milliGRAM(s) Oral daily  metoprolol succinate ER 50 milliGRAM(s) Oral daily  metroNIDAZOLE  IVPB 500 milliGRAM(s) IV Intermittent every 12 hours    MEDICATIONS  (PRN):  acetaminophen     Tablet .. 650 milliGRAM(s) Oral once PRN Temp greater or equal to 38C (100.4F), Mild Pain (1 - 3)  aluminum hydroxide/magnesium hydroxide/simethicone Suspension 30 milliLiter(s) Oral every 4 hours PRN Dyspepsia  melatonin 3 milliGRAM(s) Oral at bedtime PRN Insomnia  ondansetron Injectable 4 milliGRAM(s) IV Push once PRN Nausea and/or Vomiting      Allergies  Eliquis (Unknown)  penicillin (Rash)    Intolerances    Vital Signs Last 24 Hrs  T(C): 36.9 (29 Apr 2024 07:50), Max: 37.1 (28 Apr 2024 20:31)  T(F): 98.4 (29 Apr 2024 07:50), Max: 98.8 (28 Apr 2024 20:31)  HR: 59 (29 Apr 2024 07:50) (59 - 63)  BP: 143/64 (29 Apr 2024 07:50) (140/66 - 143/64)  RR: 18 (29 Apr 2024 07:50) (18 - 18)  SpO2: 98% (29 Apr 2024 07:50) (98% - 100%)    Parameters below as of 29 Apr 2024 07:50  Patient On (Oxygen Delivery Method): room air    LABS:                        12.7   19.65 )-----------( 424      ( 29 Apr 2024 06:33 )             40.5              04-29    146<H>  |  117<H>  |  33<H>  ----------------------------<  113<H>  3.6   |  22  |  0.99    Ca    9.7      29 Apr 2024 06:33    TPro  5.7<L>  /  Alb  2.9<L>  /  TBili  0.7  /  DBili  x   /  AST  28  /  ALT  9<L>  /  AlkPhos  111  04-29            PT/INR - ( 29 Apr 2024 06:33 )   PT: 32.8 sec;   INR: 3.00 ratio         PTT - ( 29 Apr 2024 06:33 )  PTT:56.4 sec  Urinalysis Basic - ( 29 Apr 2024 06:33 )    Color: x / Appearance: x / SG: x / pH: x  Gluc: 113 mg/dL / Ketone: x  / Bili: x / Urobili: x   Blood: x / Protein: x / Nitrite: x   Leuk Esterase: x / RBC: x / WBC x   Sq Epi: x / Non Sq Epi: x / Bacteria: x    RADIOLOGY & ADDITIONAL STUDIES:

## 2024-04-29 NOTE — CONSULT NOTE ADULT - CONSULT REASON
CAD
abx management
epigastric pain
complex goals of care and symptom management
cholecystitis
thrombocytosis
percutaneous cholecystostomy tube

## 2024-04-29 NOTE — PROGRESS NOTE ADULT - ASSESSMENT
89 y/o female with h/o Atrial fib, on warfarin, CHF, DJD DERIAN, s/p PPM, chronic low back pain, thrombocytosis was admitted on 4/27 form home for worsening abdominal pain, radiating into her low back. The patient symptoms started the day PTA and got worse, she also has nausea but no vomiting. The patient stated the he had no appetite. 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 In ER she received cefepime.     1. Acute cholecystitis. Pyuria. Possible UTI. Allergy to PCN.   -leukocytosis  -BC x 2, urine c/s noted - small amount of EN spp noted   -on ceftriaxone 2 gm IV qd and metronidazole 500 mg IV q12h # 2  -tolerating abx well so far; no side effects noted  -monitor closely in madhuri of PCN allergy history   -surgical evaluation  -for HIDA scan today  -monitor abdomen  -continue abx coverage   -monitor temps  -f/u CBC  -supportive care  2. Other issues:   -care per medicine    Clinical team may change from intravenous to oral antibiotics when the following criteria are met:   1. Patient is clinically improving/stable       a)	Improved signs and symptoms of infection from initial presentation       b)	Afebrile for 24 hours       c)	Leukocytosis trending towards normal range   2. Patient is tolerating oral intake   3. Initial/repeat blood cultures are negative    When above criteria met may change iv antibiotics to an appropriate oral agent  Cannot advise changing to oral antibiotic therapy until culture sensitivity is available.

## 2024-04-29 NOTE — CONSULT NOTE ADULT - REASON FOR ADMISSION
abdominal pain , cholecystitis

## 2024-04-29 NOTE — SWALLOW BEDSIDE ASSESSMENT ADULT - NS SPL SWALLOW CLINIC TRIAL FT
The pt was diagnosed with gingivitis/osteonecrosis of jaw at [facility] per daughter. Pt c/o oral pain upon attempts to orally process foods and stated that the oral pain with foods requiring mastication is so severe that her oral diet PTH consisted of puree food/liquids. On exam, the pt demonstrated Oropharyngeal Swallowing integrity subjectively appears to be grossly within functional parameters for age with puree food/liquids. Bolus formation/transfer mechanically functional for age with latter PO types w/o an overt oral motor focality, swallow triggered in an acceptable time frame for age and laryngeal lift on palpation during swallowing trials was felt to be functional for age as well. Solids not offered given severity of oral discomfort with reported associated oral pain upon attempts to masticate foods that require chewing. NO behavioral aspiration signs exhibited. No change in O2 sats noted. Dyspepsia was denied.

## 2024-04-29 NOTE — SWALLOW BEDSIDE ASSESSMENT ADULT - ORAL PREPARATORY PHASE
See below for specific clinical swallowing information.
no abdominal pain, no bloating, no constipation, no diarrhea, no nausea and no vomiting.

## 2024-04-29 NOTE — SWALLOW BEDSIDE ASSESSMENT ADULT - SLP GENERAL OBSERVATIONS
On encounter, an age acceptable loss of bulk was apparent in pt's strap muscle regions. She is missing several teeth. The pt was able to verbalize during communicative probes without evidence of a primary speech-language pathology. Pt is able to verbalize needs and is reportedly at speech-language baseline.

## 2024-04-29 NOTE — SWALLOW BEDSIDE ASSESSMENT ADULT - SWALLOW EVAL: PROGNOSIS
2) On encounter, an age acceptable loss of bulk was apparent in pt's strap muscle regions. She is missing several teeth. The pt was able to verbalize during communicative probes without evidence of a primary speech-language pathology. Pt is able to verbalize needs and is reportedly at speech-language baseline.

## 2024-04-29 NOTE — SWALLOW BEDSIDE ASSESSMENT ADULT - SWALLOW EVAL: RECOMMENDED DIET
A PUREE TEXTURE DIET WITH THIN LIQUID CONSISTENCIES IS TOLERATED FROM AN OROPHARYNGEAL SWALLOWING PERSPECTIVE ON EXAM AND ACCOMMODATES HER EXPRESSED FOOD PREFERENCES AT THIS TIME. IF PT NEEDS TO BE ON A LIQUID DIET FOR GI REASONS, SHE IS ABLE TO TOLERATE THIN LIQUIDS FROM AN OROPHARYNGEAL SWALLOWING PERSPECTIVE AS WELL.

## 2024-04-29 NOTE — SWALLOW BEDSIDE ASSESSMENT ADULT - ADDITIONAL RECOMMENDATIONS
1) HOSPITALIST, SURGERY AND NUTRITION FOLLOW UP    2) DAUGHTER STATED THAT PT WAS DIAGNOSED WITH GINGIVITIS/OSTEONECROSIS OF JAW AT [facility]. F/U FOR THE SAME SHOULD BE PURSUED AFTER D/C FROM HOSPITAL AS WELL.

## 2024-04-29 NOTE — SWALLOW BEDSIDE ASSESSMENT ADULT - COMMENTS
The pt was admitted to  with N/V and abdominal pain. Hospital course is remarkable for cholecystitis and ulcerative gingivitis. This profile is superimposed upon a history of chronic thrombocytosis, RA, DJD, CHF, atrial fibrillation, SSS s/p PPM, carotid stenosis, HTN, and DERIAN. The pt was admitted to  with N/V and abdominal pain. Hospital course is remarkable for cholecystitis and ulcerative gingivitis. This profile is superimposed upon a history of chronic thrombocytosis(on Hydroxyurea), RA, DJD, CHF, atrial fibrillation, SSS s/p PPM, carotid stenosis, HTN, and DERIAN.

## 2024-04-29 NOTE — SWALLOW BEDSIDE ASSESSMENT ADULT - SWALLOW EVAL: DIAGNOSIS
1) The pt was diagnosed with gingivitis/osteonecrosis of jaw at [facility] per daughter. Pt c/o oral pain upon attempts to orally process foods and stated that the oral pain with foods requiring mastication is so severe that her oral diet PTH consisted of puree food/liquids. On exam, the pt demonstrates Oropharyngeal Swallowing integrity subjectively appears to be grossly within functional parameters for age with puree food/liquids. Solids not offered given severity of oral discomfort with reported associated oral pain upon attempts to masticate foods that require chewing. NO behavioral aspiration signs exhibited. No change in O2 sats noted. Dyspepsia was denied.

## 2024-04-29 NOTE — PROCEDURE NOTE - INTERROGATION NOTE: COMMENTS
Normal functioning dual chamber PPM with battery longevity 2 years. Afib with V-pacing 98%. overall rate controlled. No stored data to review. No setting change made.

## 2024-04-29 NOTE — CONSULT NOTE ADULT - ASSESSMENT
89F presenting with abdominal pain. US with concerns for acute cholecystitis. IR consulted for percutaneous cholecystostomy     -HIDA scan with no evidence of acute cholecystitis or biliary obstruction. No  intervention warranted at this time. Findings reviewed with Dr. Stephenson  -Remainder of care per primary team  -Reconsult as needed.

## 2024-04-29 NOTE — PROGRESS NOTE ADULT - ASSESSMENT
89 yo female with Hx. of Atrial fib, on warfarin, CHF,  DJD DERIAN, s/p PPM, chronic low back pain, thrombocytosis   was BIBA form home with c/o abdominal pain , radiating into her low back. The patient symptoms started yesterday and got worse, she also has nausea but no vomiting. The patient daughter is present and states the patient had no appetite. 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       #Sepsis, PoA, Abdominal pain with nausea/vomiting, possible acute acalculous cholecystitis   -CT abdomen/Pelvis: as above   -US abdomen: as above   -f/u on HIDA  -IV fluids   -d/c Cefepime   -Rocephin and Flagyl   -f/u on cultures   -GI eval appreciated   -Surgery eval noted  -IR consult for possible cholecystostomy tube placement   -ID following     #CAD s/p PCI   -Metoprolol Succinate   -Losartan   -hold Statin     #Chronic Afib on Coumadin, Suprathereputic INR  -s/p 1x dose of Vitamin K   -INR: 3.0  -will reverse INR for any planned intervention   -Hold coumadin   -Metoprolol Succinate     #SSS s/p PPM placement   -EP eval for PPM interrogation     #HTN  -c/w Losartan   -c/w Metoprolol     #HFpEF  -on Lasix 20mg, hold for now     #Essential Thrombocytosis   -on hydroxyurea  -Continue to hold      #Ulcerative Gingivitis with possible osteonecrosis   -denies being on bisphonates   -f/u as outpatient  -SLP eval: appreciated    #Severe Protein Calorie Malnutrition   -nutrition eval      #VTE Prophylaxis   -SCDs,   -on Couamdin     #Advanced Care Directives   -DNR/DNI with trial NIPPV  -MOSLT in the chart   -Palliative team following    discussed with daughter

## 2024-04-29 NOTE — CONSULT NOTE ADULT - SUBJECTIVE AND OBJECTIVE BOX
HPI:     "88 yo female with Hx. of Atrial fib, on warfarin, CHF,  DJD DERIAN, s/p PPM, chronic low back pain, thrombocytosis   was BIBA form home with c/o abdominal pain , radiating into her low back. The patient symptoms started yesterday and got worse, she also has nausea but no vomiting. The patient daughter is present and states the patient had no appetite. 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  "      pt esen and examined  stated she didnt have any pain today.   hadnt slept well overnight but didnt really know why.      She defers all discussions to her daughter Marialuisa (who also is [facility] on charT).    She defers discussions and decisions to her .         We discussed Palliative Care team being a supportive team when a patient has ongoing illnesses.  We also discussed that it is not an end of life care service, but can help navigate symptoms and emotional support throughout their hospital stay here.      PAIN: ( ) YES  (  x)  NO       DYSPNEA ( ) Yes ( X) No     PAST MEDICAL & SURGICAL HISTORY:  HTN (hypertension)      Afib      Sleep apnea      Hyperlipidemia      Pacemaker      SSS (sick sinus syndrome)      Carotid artery disease      Chronic CHF      Pacemaker          SOCIAL HX:    Hx opiate tolerance ( )YES  (x )NO    Baseline ADLs  (Prior to Admission)  ( ) Independent   ( x)Dependent    FAMILY HISTORY:  CAD (coronary artery disease) (Father, Mother)    Family history of breast cancer in sister (Sibling)        Review of Systems:    Anxiety- denies  Depression-denies  Physical Discomfort- in NAD  Dyspnea-denies  Constipation-denies  Diarrhea-denies  Nausea-denies  Vomiting-denies  Anorexia-denies  Weight Loss- denies  Cough-denies  Secretions-denies  Fatigue-denies  Weakness-denies  Delirium-denies    All other systems reviewed and negative       PHYSICAL EXAM:    Vital Signs Last 24 Hrs  T(C): 36.9 (2024 07:50), Max: 37.1 (2024 20:31)  T(F): 98.4 (2024 07:50), Max: 98.8 (2024 20:31)  HR: 59 (2024 07:50) (59 - 63)  BP: 143/64 (2024 07:50) (140/66 - 143/64)  BP(mean): --  RR: 18 (2024 07:50) (18 - 18)  SpO2: 98% (2024 07:50) (98% - 100%)    Parameters below as of 2024 07:50  Patient On (Oxygen Delivery Method): room air      Daily     Daily Weight in k.8 (2024 06:08)    PPSV2: 40%  FAST:    General: calm in NAD  Mental Status: awake alert oriented x3  HEENT: eomi, perrl  Lungs: ctabl b/l bs  Cardiac: s1s2 no mgr  GI: soft nontender +BS  : voids  Ext: moves all 4 extremities spontaneously  Neuro: no gross findings     LABS:                        12.7   19.65 )-----------( 424      ( 2024 06:33 )             40.5         146<H>  |  117<H>  |  33<H>  ----------------------------<  113<H>  3.6   |  22  |  0.99    Ca    9.7      2024 06:33    TPro  5.7<L>  /  Alb  2.9<L>  /  TBili  0.7  /  DBili  x   /  AST  28  /  ALT  9<L>  /  AlkPhos  111      PT/INR - ( 2024 06:33 )   PT: 32.8 sec;   INR: 3.00 ratio         PTT - ( 2024 06:33 )  PTT:56.4 sec  Albumin: Albumin: 2.9 g/dL ( @ 06:33)      Allergies    Eliquis (Unknown)  penicillin (Rash)    Intolerances      MEDICATIONS  (STANDING):  cefTRIAXone Injectable. 2000 milliGRAM(s) IV Push every 24 hours  lactated ringers. 1000 milliLiter(s) (75 mL/Hr) IV Continuous <Continuous>  losartan 25 milliGRAM(s) Oral daily  metoprolol succinate ER 50 milliGRAM(s) Oral daily  metroNIDAZOLE  IVPB 500 milliGRAM(s) IV Intermittent every 12 hours    MEDICATIONS  (PRN):  acetaminophen     Tablet .. 650 milliGRAM(s) Oral once PRN Temp greater or equal to 38C (100.4F), Mild Pain (1 - 3)  aluminum hydroxide/magnesium hydroxide/simethicone Suspension 30 milliLiter(s) Oral every 4 hours PRN Dyspepsia  melatonin 3 milliGRAM(s) Oral at bedtime PRN Insomnia  morphine  - Injectable 2 milliGRAM(s) IV Push once PRN Moderate Pain (4 - 6)  ondansetron Injectable 4 milliGRAM(s) IV Push once PRN Nausea and/or Vomiting      RADIOLOGY/ADDITIONAL STUDIES:

## 2024-04-29 NOTE — CONSULT NOTE ADULT - CONSULT REQUESTED DATE/TIME
28-Apr-2024 08:37
28-Apr-2024 09:46
28-Apr-2024 10:42
27-Apr-2024 15:59
29-Apr-2024 22:26
29-Apr-2024 07:30
29-Apr-2024 14:54

## 2024-04-29 NOTE — CONSULT NOTE ADULT - ASSESSMENT
Process of Care  --Reviewed dx/treatment problems and alignment with Goals of Care    Physical Aspects of Care  --Pain  patient denies at this time  c/w current managment    --Bowel Regimen  denies constipation  risk for constipation d/t immobility  daily dulcolax    --Dyspnea  No SOB at this time  comfortable and in NAD    --Nausea Vomiting  denies    --Weakness  PT as tolerated     Psychological and Psychiatric Aspects of Care:   --Greif/Bereavment: emotional support provided  --Hx of psychiatric dx: none  -Pastoral Care Available PRN     Social Aspects of Care  -SW involved     Cultural Aspects  -Primary Language: English    Goals of Care:     We discussed Palliative Care team being a supportive team when a patient has ongoing illnesses.  We also discussed that it is not an end of life care service, but can help navigate symptoms and emotional support througout their hospital stay here.    Hospice was explained as well  as an end of life care philosophy.  When a disease cannot be cured, or family/patient decide the treatment burdens out weigh the risk and one choses to change focus of treatment from cure to quality/comfort.       Prognosis: Death can occur at anytime, but if disease continues to progress naturally patient likely has days to weeks.    Ethical and Legal Aspects:   NA        Capacity: pt w/ simple capacity -defers to her daughter  HCP/Surrogate: Marialuisa Dubois ([facility]   Km Dubois ([facility])  Code Status: dnr dni / trial intubation  MOLST :  dnr dni  Dispo Plan: ppending further w/u discussion     Discussed With: Case coordinated with attending and SW and RN     Time Spent: 90 minutes including the care, coordination and counseling of this patient, 50% of which was spent coordinating and counseling.

## 2024-04-30 LAB
-  AMPICILLIN: SIGNIFICANT CHANGE UP
-  CIPROFLOXACIN: SIGNIFICANT CHANGE UP
-  LEVOFLOXACIN: SIGNIFICANT CHANGE UP
-  NITROFURANTOIN: SIGNIFICANT CHANGE UP
-  TETRACYCLINE: SIGNIFICANT CHANGE UP
-  VANCOMYCIN: SIGNIFICANT CHANGE UP
ANION GAP SERPL CALC-SCNC: 5 MMOL/L — SIGNIFICANT CHANGE UP (ref 5–17)
APTT BLD: 40.8 SEC — HIGH (ref 24.5–35.6)
BUN SERPL-MCNC: 31 MG/DL — HIGH (ref 7–23)
CALCIUM SERPL-MCNC: 9.4 MG/DL — SIGNIFICANT CHANGE UP (ref 8.5–10.1)
CHLORIDE SERPL-SCNC: 118 MMOL/L — HIGH (ref 96–108)
CO2 SERPL-SCNC: 23 MMOL/L — SIGNIFICANT CHANGE UP (ref 22–31)
CREAT SERPL-MCNC: 0.94 MG/DL — SIGNIFICANT CHANGE UP (ref 0.5–1.3)
CULTURE RESULTS: ABNORMAL
EGFR: 58 ML/MIN/1.73M2 — LOW
GLUCOSE SERPL-MCNC: 128 MG/DL — HIGH (ref 70–99)
HCT VFR BLD CALC: 37.1 % — SIGNIFICANT CHANGE UP (ref 34.5–45)
HGB BLD-MCNC: 11.4 G/DL — LOW (ref 11.5–15.5)
INR BLD: 2.03 RATIO — HIGH (ref 0.85–1.18)
MCHC RBC-ENTMCNC: 29.9 PG — SIGNIFICANT CHANGE UP (ref 27–34)
MCHC RBC-ENTMCNC: 30.7 GM/DL — LOW (ref 32–36)
MCV RBC AUTO: 97.4 FL — SIGNIFICANT CHANGE UP (ref 80–100)
METHOD TYPE: SIGNIFICANT CHANGE UP
ORGANISM # SPEC MICROSCOPIC CNT: ABNORMAL
ORGANISM # SPEC MICROSCOPIC CNT: SIGNIFICANT CHANGE UP
PLATELET # BLD AUTO: 376 K/UL — SIGNIFICANT CHANGE UP (ref 150–400)
POTASSIUM SERPL-MCNC: 3.5 MMOL/L — SIGNIFICANT CHANGE UP (ref 3.5–5.3)
POTASSIUM SERPL-SCNC: 3.5 MMOL/L — SIGNIFICANT CHANGE UP (ref 3.5–5.3)
PROTHROM AB SERPL-ACNC: 22.5 SEC — HIGH (ref 9.5–13)
RBC # BLD: 3.81 M/UL — SIGNIFICANT CHANGE UP (ref 3.8–5.2)
RBC # FLD: 20.9 % — HIGH (ref 10.3–14.5)
SODIUM SERPL-SCNC: 146 MMOL/L — HIGH (ref 135–145)
SPECIMEN SOURCE: SIGNIFICANT CHANGE UP
WBC # BLD: 14.72 K/UL — HIGH (ref 3.8–10.5)
WBC # FLD AUTO: 14.72 K/UL — HIGH (ref 3.8–10.5)

## 2024-04-30 PROCEDURE — 99233 SBSQ HOSP IP/OBS HIGH 50: CPT

## 2024-04-30 RX ORDER — ATORVASTATIN CALCIUM 80 MG/1
10 TABLET, FILM COATED ORAL AT BEDTIME
Refills: 0 | Status: DISCONTINUED | OUTPATIENT
Start: 2024-04-30 | End: 2024-05-03

## 2024-04-30 RX ORDER — WARFARIN SODIUM 2.5 MG/1
2 TABLET ORAL ONCE
Refills: 0 | Status: COMPLETED | OUTPATIENT
Start: 2024-04-30 | End: 2024-04-30

## 2024-04-30 RX ADMIN — Medication 100 MILLIGRAM(S): at 10:29

## 2024-04-30 RX ADMIN — Medication 3 MILLIGRAM(S): at 21:09

## 2024-04-30 RX ADMIN — WARFARIN SODIUM 2 MILLIGRAM(S): 2.5 TABLET ORAL at 21:09

## 2024-04-30 RX ADMIN — LOSARTAN POTASSIUM 25 MILLIGRAM(S): 100 TABLET, FILM COATED ORAL at 10:29

## 2024-04-30 RX ADMIN — Medication 100 MILLIGRAM(S): at 21:09

## 2024-04-30 RX ADMIN — ATORVASTATIN CALCIUM 10 MILLIGRAM(S): 80 TABLET, FILM COATED ORAL at 21:08

## 2024-04-30 RX ADMIN — CEFTRIAXONE 2000 MILLIGRAM(S): 500 INJECTION, POWDER, FOR SOLUTION INTRAMUSCULAR; INTRAVENOUS at 10:29

## 2024-04-30 RX ADMIN — Medication 50 MILLIGRAM(S): at 10:29

## 2024-04-30 NOTE — PROGRESS NOTE ADULT - ASSESSMENT
88 yo female with Hx. of Atrial fib, on warfarin, CHF,  DJD DERIAN, s/p PPM, chronic low back pain, thrombocytosis   was BIBA form home with c/o abdominal pain , radiating into her low back. The patient symptoms started yesterday and got worse, she also has nausea but no vomiting. The patient daughter is present and states the patient had no appetite. 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       #Sepsis, PoA, Abdominal pain with nausea/vomiting, possible acute acalculous cholecystitis/acute cholangitis   -CT abdomen/Pelvis: as above   -US abdomen: as above   -HIDA: No evidence of acute cholecystitis or biliary obstruction.  -IV fluids   -d/c Cefepime   -Rocephin and Flagyl   -BCx: NGTD   -GI eval appreciated: no role for ERCP   -Surgery eval noted  -IR consult for possible cholecystostomy tube placement   -ID following     #CAD s/p PCI   -Metoprolol Succinate   -Losartan   -c/w stating     #Chronic Afib on Coumadin, Suprathereputic INR  -s/p 1x dose of Vitamin K   -INR: 2.03  -will reverse INR for any planned intervention   -resume Coumadin   -Metoprolol Succinate     #SSS s/p PPM placement   -PPM interrogated: Normal functioning dual chamber PPM with battery longevity 2 years    #HTN  -c/w Losartan   -c/w Metoprolol     #HFpEF  -on Lasix 20mg, hold for now     #Essential Thrombocytosis   -on hydroxyurea  -Continue to hold      #Ulcerative Gingivitis with possible osteonecrosis   -denies being on bisphonates   -f/u as outpatient  -SLP eval: appreciated    #Severe Protein Calorie Malnutrition   -nutrition eval      #VTE Prophylaxis   -SCDs,   -on Coumadin     #Advanced Care Directives   -DNR/DNI with trial NIPPV  -MOSLT in the chart   -Palliative team following    discussed with daughter

## 2024-04-30 NOTE — PHYSICAL THERAPY INITIAL EVALUATION ADULT - IMPAIRED TRANSFERS: SIT/STAND, REHAB EVAL
Patient is 43 y/o female admitted for bariatric surgery. Educated patient on how to take her home medications after surgery (Lipitor, Janumet, Azelastine, spray, Omeprazole). Explained what medications can be crushed, capsules that may be opened, and medication formulations that need to be changed. Also educated on her M2B medications (ie. Prilosec, percocet, Zofran ODT,). Made patient aware to hold off on Bariatric fusion vitamins until after first post-op visit in addition to avoiding NSAIDs and any other supplements until then. Stressed the importance of hydration and ambulation. Counseled patient to space out Tylenol and Percocet usage to avoid acetaminophen overdose. Patient in good spirits and eager for discharge
decreased strength

## 2024-04-30 NOTE — PROGRESS NOTE ADULT - CONVERSATION DETAILS
Goals of Care: Ongoing discussion - family considering home hospice    Previous Services:    ADVANCE DIRECTIVES:  [ x ] YES [ ] NO    - Health Care Proxy on chart naming daughter Marialuisa   - MOLST on chart reflecting DNR/DNI with trial NIV    Anticipated D/C Plan: Home - [facility] vs Home Hospice                     Summary: Palliative SW met with patient and pt's daughter Marialuisa to introduce team and offer support. Palliative roles explained. Pt appeared alert, oriented with pleasant mood and some forgetfulness. She reports some pain in her abdomen. With pt's permission, this SW & daughter met in another room to discuss goals of care. Marialuisa reports that pt resides home with her and Marialuisa's . Pt is  -lost  approx 20 years ago. Daughter Marialuisa shares that pt has always been sick, and was not supposed to live past the age of 30 - reports heart block at 10 yrs old. She shares that pt had a pacemaker placed many years ago and it continues to work. She shares that pt has had 2 cardiac arrests in the past, both times successfully resuscitated.     Daughter Marialuisa reports that pt went to the dentist to have some teeth pulled and after doing that, dentist had to have collagen strip placed in the hopes the gums could regenerate and fill the hole. At her followup procedure, pt was immediately referred to an oral surgeon who diagnosed her with osteonecrosis and started her on various treatments including antibiotics. Marialuisa feels like this has been the beginning of the issues to follow. Pt has been having trouble eating and having lack of appetite.     SW inquired about patient's wishes. Daughter acknowledges completing MOLST reflecting DNR/DNI with trial NIV with Dr. Solis. She explains that she does not want her mother to suffer but would consider treatments such as hyperbaric O2, to treat her mother's jaw. Daughter inquired about hyperbaric O2 chamber which was reportedly recommended by Dr. Arenas.    SW reviewed different options including hospice. Hospice philosophy explained. Marialuisa shares that she has dealt with hospice for a family member in the past. She is considering this option for her mother but would like to take things one day at a time. Her first choice would be [facility] due to location of inpatient facility. Daughter does not want pt to go to [facility]NF.    Emotional support provided. Plan to see how patient does over the next few days. Our team will continue to follow.

## 2024-04-30 NOTE — PROGRESS NOTE ADULT - ASSESSMENT
Process of Care  --Reviewed dx/treatment problems and alignment with Goals of Care    Physical Aspects of Care  --Pain  patient denies at this time  c/w current managment    --Bowel Regimen  denies constipation  risk for constipation d/t immobility  daily dulcolax    --Dyspnea  No SOB at this time  comfortable and in NAD    --Nausea Vomiting  denies    --Weakness  PT as tolerated     Psychological and Psychiatric Aspects of Care:   --Greif/Bereavment: emotional support provided  --Hx of psychiatric dx: none  -Pastoral Care Available PRN     Social Aspects of Care  -SW involved     Cultural Aspects  -Primary Language: English    Goals of Care:     pt comfortable and in NAD  no changes in GoC at thsi time    Ethical and Legal Aspects:   NA        Capacity: pt w/ simple capacity -defers to her daughter  HCP/Surrogate: Marialuisa Dubois ([facility]   Km Dubois ([facility])  Code Status: dnr dni / trial intubation  MOLST :  dnr dni  Dispo Plan: ppending further w/u discussion     Discussed With: Case coordinated with attending and SW and RN     Time Spent: 50 minutes including the care, coordination and counseling of this patient, 50% of which was spent coordinating and counseling.

## 2024-04-30 NOTE — PHYSICAL THERAPY INITIAL EVALUATION ADULT - LEVEL OF INDEPENDENCE: GAIT, REHAB EVAL
05/30/17 0830   Exercise (Home) O2 Eval   Liter Flow 0   Exercise O2 FIO2 (%) 21 %   Heart rate at rest 88 beats/min   SpO2 at rest 91 %   Heart rate during activity 96 beats/min   SpO2 during activity 93 %   Heart rate after activity 95 beats/min   SpO2 after activity 93 %   Distance (feet) 600 ft   Tolerance tolerated well on room air   $ Exercise O2 procedure complete (Pulmonary Stress Test) Procedure Complete   Home Oxygen Evaluation    Patient seen and evaluated for home oxygen needs per physician order.  A summary of the evaluation is listed below.    Oxygen saturation 91% at rest, on room air      Oxygen saturation with activity:  93% on room air    Walked patient >600 feet  Recommendations are as follows:Patient does not qualify for home O2 at this time  Paulette Pavon RRT   contact guard

## 2024-04-30 NOTE — PROGRESS NOTE ADULT - ASSESSMENT
89 y/o female with h/o Atrial fib, on warfarin, CHF, DJD DERIAN, s/p PPM, chronic low back pain, thrombocytosis was admitted on 4/27 form home for worsening abdominal pain, radiating into her low back. The patient symptoms started the day PTA and got worse, she also has nausea but no vomiting. The patient stated the he had no appetite. 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 In ER she received cefepime.     1. Acute cholecystitis/ acute cholangitis. Pyuria. Possible UTI. Allergy to PCN.   -leukocytosis is persistent  -BC x 2, urine c/s noted - small amount of EN spp noted   -on ceftriaxone 2 gm IV qd and metronidazole 500 mg IV q12h # 3  -tolerating abx well so far; no side effects noted  -monitor closely in madhuri of PCN allergy history   -HIDA scan is negative   -monitor abdomen  -continue abx coverage   -monitor temps  -f/u CBC  -supportive care  2. Other issues:   -care per medicine    Clinical team may change from intravenous to oral antibiotics when the following criteria are met:   1. Patient is clinically improving/stable       a)	Improved signs and symptoms of infection from initial presentation       b)	Afebrile for 24 hours       c)	Leukocytosis trending towards normal range   2. Patient is tolerating oral intake   3. Initial/repeat blood cultures are negative    When above criteria met may change iv antibiotics to an appropriate oral agent  Cannot advise changing to oral antibiotic therapy until culture sensitivity is available.

## 2024-04-30 NOTE — PROGRESS NOTE ADULT - ASSESSMENT
· Assessment	  1. essential thrombocytosis  platelets reasonably controlled  hold off on hyrea for now  plts continue to be stable    2. possible cholecystitis  hida negative  ir and d surg no intervention  ID following and transitioned to flagyl/ceftriaxone with symptom improvement      3. 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 hyperbaric oxygen as OP      Thank you for the courtesy of this consultation and we will continue to follow.    Jesse Woodard MD  [facility]  Cell: [phone]

## 2024-04-30 NOTE — PHYSICAL THERAPY INITIAL EVALUATION ADULT - PERTINENT HX OF CURRENT PROBLEM, REHAB EVAL
89 y/o female with h/o Atrial fib, on warfarin, CHF, DJD DERIAN, s/p PPM, chronic low back pain, thrombocytosis was admitted on 4/27 form home for worsening abdominal pain, radiating into her low back. The patient symptoms started the day PTA and got worse, she also has nausea but no vomiting. The patient stated the he had no appetite. 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 In ER she received cefepime.

## 2024-05-01 ENCOUNTER — TRANSCRIPTION ENCOUNTER (OUTPATIENT)
Age: 89
End: 2024-05-01

## 2024-05-01 LAB
ALBUMIN SERPL ELPH-MCNC: 2.3 G/DL — LOW (ref 3.3–5)
ALP SERPL-CCNC: 80 U/L — SIGNIFICANT CHANGE UP (ref 40–120)
ALT FLD-CCNC: 8 U/L — LOW (ref 12–78)
ANION GAP SERPL CALC-SCNC: 4 MMOL/L — LOW (ref 5–17)
APTT BLD: 40.2 SEC — HIGH (ref 24.5–35.6)
AST SERPL-CCNC: 24 U/L — SIGNIFICANT CHANGE UP (ref 15–37)
BILIRUB SERPL-MCNC: 0.5 MG/DL — SIGNIFICANT CHANGE UP (ref 0.2–1.2)
BUN SERPL-MCNC: 27 MG/DL — HIGH (ref 7–23)
CALCIUM SERPL-MCNC: 9 MG/DL — SIGNIFICANT CHANGE UP (ref 8.5–10.1)
CHLORIDE SERPL-SCNC: 115 MMOL/L — HIGH (ref 96–108)
CO2 SERPL-SCNC: 24 MMOL/L — SIGNIFICANT CHANGE UP (ref 22–31)
CREAT SERPL-MCNC: 0.85 MG/DL — SIGNIFICANT CHANGE UP (ref 0.5–1.3)
EGFR: 65 ML/MIN/1.73M2 — SIGNIFICANT CHANGE UP
GLUCOSE SERPL-MCNC: 89 MG/DL — SIGNIFICANT CHANGE UP (ref 70–99)
HCT VFR BLD CALC: 34.9 % — SIGNIFICANT CHANGE UP (ref 34.5–45)
HGB BLD-MCNC: 10.8 G/DL — LOW (ref 11.5–15.5)
INR BLD: 1.9 RATIO — HIGH (ref 0.85–1.18)
MCHC RBC-ENTMCNC: 30.4 PG — SIGNIFICANT CHANGE UP (ref 27–34)
MCHC RBC-ENTMCNC: 30.9 GM/DL — LOW (ref 32–36)
MCV RBC AUTO: 98.3 FL — SIGNIFICANT CHANGE UP (ref 80–100)
PLATELET # BLD AUTO: 340 K/UL — SIGNIFICANT CHANGE UP (ref 150–400)
POTASSIUM SERPL-MCNC: 3.2 MMOL/L — LOW (ref 3.5–5.3)
POTASSIUM SERPL-SCNC: 3.2 MMOL/L — LOW (ref 3.5–5.3)
PROT SERPL-MCNC: 4.6 GM/DL — LOW (ref 6–8.3)
PROTHROM AB SERPL-ACNC: 21.1 SEC — HIGH (ref 9.5–13)
RBC # BLD: 3.55 M/UL — LOW (ref 3.8–5.2)
RBC # FLD: 20.8 % — HIGH (ref 10.3–14.5)
SODIUM SERPL-SCNC: 143 MMOL/L — SIGNIFICANT CHANGE UP (ref 135–145)
WBC # BLD: 11.57 K/UL — HIGH (ref 3.8–10.5)
WBC # FLD AUTO: 11.57 K/UL — HIGH (ref 3.8–10.5)

## 2024-05-01 PROCEDURE — 99233 SBSQ HOSP IP/OBS HIGH 50: CPT

## 2024-05-01 PROCEDURE — 99232 SBSQ HOSP IP/OBS MODERATE 35: CPT

## 2024-05-01 RX ORDER — POTASSIUM CHLORIDE 20 MEQ
20 PACKET (EA) ORAL
Refills: 0 | Status: COMPLETED | OUTPATIENT
Start: 2024-05-01 | End: 2024-05-01

## 2024-05-01 RX ORDER — WARFARIN SODIUM 2.5 MG/1
2 TABLET ORAL DAILY
Refills: 0 | Status: DISCONTINUED | OUTPATIENT
Start: 2024-05-01 | End: 2024-05-02

## 2024-05-01 RX ADMIN — Medication 20 MILLIEQUIVALENT(S): at 13:46

## 2024-05-01 RX ADMIN — CEFTRIAXONE 2000 MILLIGRAM(S): 500 INJECTION, POWDER, FOR SOLUTION INTRAMUSCULAR; INTRAVENOUS at 09:17

## 2024-05-01 RX ADMIN — Medication 3 MILLIGRAM(S): at 21:18

## 2024-05-01 RX ADMIN — Medication 100 MILLIGRAM(S): at 21:09

## 2024-05-01 RX ADMIN — WARFARIN SODIUM 2 MILLIGRAM(S): 2.5 TABLET ORAL at 21:18

## 2024-05-01 RX ADMIN — Medication 100 MILLIGRAM(S): at 09:17

## 2024-05-01 RX ADMIN — ATORVASTATIN CALCIUM 10 MILLIGRAM(S): 80 TABLET, FILM COATED ORAL at 21:18

## 2024-05-01 RX ADMIN — SODIUM CHLORIDE 75 MILLILITER(S): 9 INJECTION, SOLUTION INTRAVENOUS at 04:01

## 2024-05-01 RX ADMIN — Medication 20 MILLIEQUIVALENT(S): at 11:07

## 2024-05-01 NOTE — PROGRESS NOTE ADULT - ASSESSMENT
88 yo female with Hx. of Atrial fib, on warfarin, CHF,  DJD DERIAN, s/p PPM, chronic low back pain, thrombocytosis   was BIBA form home with c/o abdominal pain , radiating into her low back. The patient symptoms started yesterday and got worse, she also has nausea but no vomiting. The patient daughter is present and states the patient had no appetite. 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       #Sepsis, PoA, Abdominal pain with nausea/vomiting, possible acute acalculous cholecystitis/acute cholangitis   -CT abdomen/Pelvis: as above   -US abdomen: as above   -HIDA: No evidence of acute cholecystitis or biliary obstruction.  -IV fluids   -d/c Cefepime   -on ceftriaxone 2 gm IV qd and metronidazole 500 mg IV q12h since 4/28/24   change iv antibiotics to ceftin 500 mg PO q12h and metrnidazle 500 mg PO q12h for 10 more days starting tomorrow  -BCx: NGTD   -GI eval appreciated: no role for ERCP   -Surgery eval noted  -IR consult - -HIDA scan with no evidence of acute cholecystitis or biliary obstruction. No  intervention warranted at this time. Findings reviewed with Dr. 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 of Atrial fib, on warfarin, CHF,  DJD DERIAN, s/p PPM, chronic low back pain, thrombocytosis   was BIBA form home with c/o abdominal pain , radiating into her low back. The patient symptoms started yesterday and got worse, she also has nausea but no vomiting. The patient daughter is present and states the patient had no appetite. 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       #Sepsis, PoA, Abdominal pain with nausea/vomiting, possible acute acalculous cholecystitis/acute cholangitis   -CT abdomen/Pelvis: as above   -US abdomen: as above   -HIDA: No evidence of acute cholecystitis or biliary obstruction.  -IV fluids   -d/c Cefepime   -on ceftriaxone 2 gm IV qd and metronidazole 500 mg IV q12h since 4/28/24   change iv antibiotics to ceftin 500 mg PO q12h and metrnidazle 500 mg PO q12h for 10 more days starting tomorrow  -BCx: NGTD   -GI eval appreciated: no role for ERCP   -Surgery eval noted  -IR consult - -HIDA scan with no evidence of acute cholecystitis or biliary obstruction. No  intervention warranted at this time. Findings reviewed with Dr. 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

## 2024-05-01 NOTE — DISCHARGE NOTE NURSING/CASE MANAGEMENT/SOCIAL WORK - PATIENT PORTAL LINK FT
You can access the FollowMyHealth Patient Portal offered by [facility] by registering at the following website: [email or web address]. By joining [employer]’s FollowMyHealth portal, you will also be able to view your health information using other applications (apps) compatible with our system.

## 2024-05-01 NOTE — PROGRESS NOTE ADULT - ASSESSMENT
88 y/o female with h/o Atrial fib, on warfarin, CHF, DJD DERIAN, s/p PPM, chronic low back pain, thrombocytosis was admitted on 4/27 form home for worsening abdominal pain, radiating into her low back. The patient symptoms started the day PTA and got worse, she also has nausea but no vomiting. The patient stated the he had no appetite. 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 In ER she received cefepime.     1. Acute cholecystitis/ acute cholangitis. Pyuria. Possible UTI. Allergy to PCN.   -leukocytosis is improving  -BC x 2, urine c/s noted - small amount of EN spp noted   -on ceftriaxone 2 gm IV qd and metronidazole 500 mg IV q12h # 4  -tolerating abx well so far; no side effects noted  -monitor closely in madhuri of PCN allergy history   -HIDA scan is negative   -monitor abdomen  -continue abx coverage   -may change abx to ceftin 500 mg PO q12h and metrnidazle 500 mg PO q12h for 10 more days  -monitor temps  -f/u CBC  -supportive care  2. Other issues:   -care per medicine    Clinical team may change from intravenous to oral antibiotics when the following criteria are met:   1. Patient is clinically improving/stable       a)	Improved signs and symptoms of infection from initial presentation       b)	Afebrile for 24 hours       c)	Leukocytosis trending towards normal range   2. Patient is tolerating oral intake   3. Initial/repeat blood cultures are negative    When above criteria met may change iv antibiotics to ceftin 500 mg PO q12h and metrnidazle 500 mg PO q12h for 10 more days

## 2024-05-02 LAB
ANION GAP SERPL CALC-SCNC: 5 MMOL/L — SIGNIFICANT CHANGE UP (ref 5–17)
APTT BLD: 38.3 SEC — HIGH (ref 24.5–35.6)
BUN SERPL-MCNC: 22 MG/DL — SIGNIFICANT CHANGE UP (ref 7–23)
CALCIUM SERPL-MCNC: 8.7 MG/DL — SIGNIFICANT CHANGE UP (ref 8.5–10.1)
CHLORIDE SERPL-SCNC: 117 MMOL/L — HIGH (ref 96–108)
CO2 SERPL-SCNC: 22 MMOL/L — SIGNIFICANT CHANGE UP (ref 22–31)
CREAT SERPL-MCNC: 0.83 MG/DL — SIGNIFICANT CHANGE UP (ref 0.5–1.3)
CULTURE RESULTS: SIGNIFICANT CHANGE UP
CULTURE RESULTS: SIGNIFICANT CHANGE UP
EGFR: 67 ML/MIN/1.73M2 — SIGNIFICANT CHANGE UP
GLUCOSE SERPL-MCNC: 85 MG/DL — SIGNIFICANT CHANGE UP (ref 70–99)
HCT VFR BLD CALC: 35.8 % — SIGNIFICANT CHANGE UP (ref 34.5–45)
HGB BLD-MCNC: 11 G/DL — LOW (ref 11.5–15.5)
INR BLD: 1.8 RATIO — HIGH (ref 0.85–1.18)
MCHC RBC-ENTMCNC: 30.5 PG — SIGNIFICANT CHANGE UP (ref 27–34)
MCHC RBC-ENTMCNC: 30.7 GM/DL — LOW (ref 32–36)
MCV RBC AUTO: 99.2 FL — SIGNIFICANT CHANGE UP (ref 80–100)
PLATELET # BLD AUTO: 340 K/UL — SIGNIFICANT CHANGE UP (ref 150–400)
POTASSIUM SERPL-MCNC: 3.9 MMOL/L — SIGNIFICANT CHANGE UP (ref 3.5–5.3)
POTASSIUM SERPL-SCNC: 3.9 MMOL/L — SIGNIFICANT CHANGE UP (ref 3.5–5.3)
PROTHROM AB SERPL-ACNC: 20 SEC — HIGH (ref 9.5–13)
RBC # BLD: 3.61 M/UL — LOW (ref 3.8–5.2)
RBC # FLD: 20.5 % — HIGH (ref 10.3–14.5)
SODIUM SERPL-SCNC: 144 MMOL/L — SIGNIFICANT CHANGE UP (ref 135–145)
SPECIMEN SOURCE: SIGNIFICANT CHANGE UP
SPECIMEN SOURCE: SIGNIFICANT CHANGE UP
WBC # BLD: 12.15 K/UL — HIGH (ref 3.8–10.5)
WBC # FLD AUTO: 12.15 K/UL — HIGH (ref 3.8–10.5)

## 2024-05-02 PROCEDURE — 99232 SBSQ HOSP IP/OBS MODERATE 35: CPT

## 2024-05-02 RX ORDER — WARFARIN SODIUM 2.5 MG/1
3 TABLET ORAL ONCE
Refills: 0 | Status: COMPLETED | OUTPATIENT
Start: 2024-05-02 | End: 2024-05-02

## 2024-05-02 RX ADMIN — Medication 100 MILLIGRAM(S): at 09:45

## 2024-05-02 RX ADMIN — LOSARTAN POTASSIUM 25 MILLIGRAM(S): 100 TABLET, FILM COATED ORAL at 09:43

## 2024-05-02 RX ADMIN — Medication 650 MILLIGRAM(S): at 09:44

## 2024-05-02 RX ADMIN — CEFTRIAXONE 2000 MILLIGRAM(S): 500 INJECTION, POWDER, FOR SOLUTION INTRAMUSCULAR; INTRAVENOUS at 09:43

## 2024-05-02 RX ADMIN — SODIUM CHLORIDE 75 MILLILITER(S): 9 INJECTION, SOLUTION INTRAVENOUS at 08:41

## 2024-05-02 RX ADMIN — WARFARIN SODIUM 3 MILLIGRAM(S): 2.5 TABLET ORAL at 21:09

## 2024-05-02 RX ADMIN — Medication 50 MILLIGRAM(S): at 09:43

## 2024-05-02 RX ADMIN — Medication 100 MILLIGRAM(S): at 20:56

## 2024-05-02 RX ADMIN — ATORVASTATIN CALCIUM 10 MILLIGRAM(S): 80 TABLET, FILM COATED ORAL at 20:57

## 2024-05-02 NOTE — PROGRESS NOTE ADULT - PROVIDER SPECIALTY LIST ADULT
Heme/Onc
Infectious Disease
Heme/Onc
Hospitalist
Hospitalist
Heme/Onc
Hospitalist
Infectious Disease
Infectious Disease
Palliative Care
Palliative Care

## 2024-05-02 NOTE — PROGRESS NOTE ADULT - SUBJECTIVE AND OBJECTIVE BOX
HOSPITALIST ATTENDING PROGRESS NOTE     Chart and meds reviewed. Patient seen and examined     CC: abdominal pain , cholecystitis    Subjective: Pt seen and evaluated. Still with RUQ abdominal pain with radiation to the back. Denies nausea/vomiting. No other acute complaints.   Daughter at bedside     : Pt seen and evaluated. Still feels weak but with poor appetite. No other complaints. HIDA today     : Pt seen and evaluated. Feeling better. Now with appetite   -s/p HIDA: negative       All other systems and founds to be negative with exception of what has been described above.         PHYSICAL EXAM:  Vital Signs Last 24 Hrs  T(C): 36.8 (2024 08:12), Max: 36.8 (2024 08:12)  T(F): 98.2 (2024 08:12), Max: 98.2 (2024 08:12)  HR: 60 (2024 08:12) (60 - 60)  BP: 123/55 (2024 08:12) (123/55 - 133/58)  RR: 18 (2024 08:12) (18 - 18)  SpO2: 98% (2024 08:12) (98% - 99%)    Parameters below as of 2024 08:12  Patient On (Oxygen Delivery Method): room air      Daily Height in cm: 160.02 (2024 18:10)    Daily Weight in k.5 (2024 05:56)    GEN: NAD, +frail   HEENT: EOMI,  moist mucous membranes, +tenderness of the left maxilla, +necrosis   NECK : Soft and supple, no JVD  LUNG: CTABL, No wheezing, rales or rhonchi  CVS: S1S2+, RRR, no M/G/R  GI: BS+, minimal tenderness, RUQ pain has improved.   EXTREMITIES: No peripheral edema  VASCULAR: 2+ peripheral pulses  NEURO: AAOx3, grossly non-focal   SKIN: +ecchymotic     HOME MEDICATIONS:  Home Medications:  allopurinol 100 mg oral tablet: 1 tab(s) orally 2 times a day (2024 11:51)  ammonium lactate 12% topical lotion: Apply topically to affected area once a day (2024 16:18)  chlorhexidine 0.12% mucous membrane liquid: 10 milliliter(s) orally 2 times a day *** STOPPED TAKING 3 DAYS AGO*** (2024 16:18)  folic acid 1 mg oral tablet: 1 tab(s) orally once a day (2024 11:51)  furosemide 20 mg oral tablet: 1 tab(s) orally once a day (2024 16:18)  losartan 25 mg oral tablet: 1 tab(s) orally once a day (2024 11:51)  methotrexate 2.5 mg oral tablet: 4 tab(s) orally once a week *** STOPPED TAKING MED A WEEK AGO*** (2024 16:18)  Metoprolol Succinate ER 50 mg oral tablet, extended release: 1 tab(s) orally once a day (2024 11:51)  montelukast 10 mg oral tablet: 1 tab(s) orally once a day (2024 11:51)  Multiple Vitamins oral tablet: 1 tab(s) orally once a day (2024 11:51)  potassium chloride 10 mEq oral tablet, extended release: 1 tab(s) orally once a day (2024 16:18)  pravastatin 20 mg oral tablet: 1 tab(s) orally once a day (in the evening) (2024 16:18)  Tylenol 500 mg oral tablet: 2 tab(s) orally every 8 hours as needed for (2024 16:18)  warfarin 2 mg oral tablet: 1 tab(s) orally once a day (2024 16:18)  ZyrTEC 10 mg oral tablet: 1 tab(s) orally once a day (2024 16:18)      MEDICATIONS  MEDICATIONS  (STANDING):  cefTRIAXone Injectable. 2000 milliGRAM(s) IV Push every 24 hours  lactated ringers. 1000 milliLiter(s) (75 mL/Hr) IV Continuous <Continuous>  losartan 25 milliGRAM(s) Oral daily  metoprolol succinate ER 50 milliGRAM(s) Oral daily  metroNIDAZOLE  IVPB 500 milliGRAM(s) IV Intermittent every 12 hours          LABS: All Labs Reviewed:                        11   1472 )-----------( 376      ( 2024 10:52 )             37.1   04-30    146<H>  |  118<H>  |  31<H>  ----------------------------<  128<H>  3.5   |  23  |  0.94    Ca    9.4      2024 10:52    TPro  5.7<L>  /  Alb  2.9<L>  /  TBili  0.7  /  DBili  x   /  AST  28  /  ALT  9<L>  /  AlkPhos  111        Urinalysis Basic - ( 2024 06:33 )    Color: x / Appearance: x / SG: x / pH: x  Gluc: 113 mg/dL / Ketone: x  / Bili: x / Urobili: x   Blood: x / Protein: x / Nitrite: x   Leuk Esterase: x / RBC: x / WBC x   Sq Epi: x / Non Sq Epi: x / Bacteria: x    Culture - Urine (24 @ 09:11)    Specimen Source: Catheterized Catheterized   Culture Results:   50,000 - 99,000 CFU/mL Enterococcus species    Culture - Blood (24 @ 12:22)    Specimen Source: .Blood None   Culture Results:   No growth at 24 hours    Culture - Blood (24 @ 12:22)    Specimen Source: .Blood None   Culture Results:   No growth at 24 hours            I&O's Detail    CAPILLARY BLOOD GLUCOSE  CAPILLARY BLOOD GLUCOSE                  CARDIOLOGY TESTING   CARDIAC MARKERS ( 2024 09:11 )  x     / x     / 88 U/L / x     / x          EKG: reviewed     ECHO:       RADIOLOGY  < from: CT Chest w/ IV Cont (24 @ 11:01) >  CHEST:    LUNGS AND LARGE AIRWAYS: PLEURA:    There are mild bilateral micronodular tree-in-bud nodular opacities,   which may reflect inflammatory/infectious small airway disease.  No lobar lung consolidation.  The central airways are patent.    No pleural effusion or pneumothorax.    VESSELS: Atherosclerotic changes thoracic aorta and coronary artery   calcifications and/or stents.    HEART:  Cardiomegaly.  Cardiac pacemaker leads.   No pericardial effusion.    MEDIASTINUM AND PRITI:  Subcentimeter short axis precarinal lymph node.    CHEST WALL AND LOWER NECK:  Heterogeneous enlarged left lobe of the thyroid gland with substernal   extension displacing the trachea to the right, similar to prior exam.  Right cardiac chest wall device.    ABDOMEN AND PELVIS:    LIVER:  Subcentimeter hypodense lesion left hepatic lobe is too small for   characterization.  BILE DUCTS: Normal caliber.  GALLBLADDER: Small pericholecystic fluid.  SPLEEN: Within normal limits.  PANCREAS: Within normal limits.  ADRENALS: 1.5 cm low-density nodule medial limb right adrenal gland.  KIDNEYS/URETERS: Within normal limits.    Metallic streak artifact related to patient's left hip arthroplasty   degrades image quality limiting evaluation in the pelvis.    BLADDER: Unremarkable as visualized.  REPRODUCTIVE ORGANS: Small calcified uterine fibroids.    BOWEL:  No bowel obstruction.  Appendix is normal.  PERITONEUM: No ascites.    VESSELS: Atherosclerotic changes; abdominal aorta is normal in caliber.  RETROPERITONEUM/LYMPH NODES: No lymphadenopathy.    ABDOMINAL WALL: Within normal limits.    BONES:  Degenerative changes spine.  Age indeterminate compression deformities at L3, L4 and L5 vertebral   bodies.  Partially imaged is left total hip arthroplasty.  There is lucency surrounding the proximal metal bone interface, which   could reflect osteolysis.    IMPRESSION:    Mild bilateral micronodular/tree-in-bud opacities, which may reflect   infectious/inflammatory small airway disease.  No lobar pneumonia.    Suggestion of small pericholecystic fluid.  This can be further characterized by right upper quadrant ultrasonography.    Indeterminate nodule right adrenal gland.  Recommend further characterization with nonemergent CT adrenal washout   study.    Left hip arthroplasty withpossible osteolysis proximal femur.  Recommend comparison with prior postoperative imaging studies.    < from: NM Hepatobiliary Imaging (24 @ 14:20) >  FINDINGS: There is prompt, homogeneous uptake of radiopharmaceutical by   the hepatocytes. Activity isseen in the gallbladder at approximately 25   minutes and in the bowel at approximately 10 minutes. There is good   clearance of activity from the liver by the end of the study.    IMPRESSION: Normal hepatobiliary scan.    No evidence of acute cholecystitis or biliary obstruction.    < end of copied text >    
Pt seen, resting    MEDICATIONS  (STANDING):  cefTRIAXone Injectable. 2000 milliGRAM(s) IV Push every 24 hours  lactated ringers. 1000 milliLiter(s) (75 mL/Hr) IV Continuous <Continuous>  losartan 25 milliGRAM(s) Oral daily  metoprolol succinate ER 50 milliGRAM(s) Oral daily  metroNIDAZOLE  IVPB 500 milliGRAM(s) IV Intermittent every 12 hours    MEDICATIONS  (PRN):  acetaminophen     Tablet .. 650 milliGRAM(s) Oral once PRN Temp greater or equal to 38C (100.4F), Mild Pain (1 - 3)  aluminum hydroxide/magnesium hydroxide/simethicone Suspension 30 milliLiter(s) Oral every 4 hours PRN Dyspepsia  melatonin 3 milliGRAM(s) Oral at bedtime PRN Insomnia  ondansetron Injectable 4 milliGRAM(s) IV Push once PRN Nausea and/or Vomiting      ROS  No fever, sweats, chills      Vital Signs Last 24 Hrs  T(C): 36.9 (29 Apr 2024 07:50), Max: 37.1 (28 Apr 2024 20:31)  T(F): 98.4 (29 Apr 2024 07:50), Max: 98.8 (28 Apr 2024 20:31)  HR: 59 (29 Apr 2024 07:50) (59 - 63)  BP: 143/64 (29 Apr 2024 07:50) (140/66 - 143/64)  BP(mean): --  RR: 18 (29 Apr 2024 07:50) (18 - 18)  SpO2: 98% (29 Apr 2024 07:50) (98% - 100%)    Parameters below as of 29 Apr 2024 07:50  Patient On (Oxygen Delivery Method): room air        PE  NAD  Awake, alert  Anicteric, MMM    No c/c/e  No rash grossly  FROM                          12.7   19.65 )-----------( 424      ( 29 Apr 2024 06:33 )             40.5       04-29    146<H>  |  117<H>  |  33<H>  ----------------------------<  113<H>  3.6   |  22  |  0.99    Ca    9.7      29 Apr 2024 06:33    TPro  5.7<L>  /  Alb  2.9<L>  /  TBili  0.7  /  DBili  x   /  AST  28  /  ALT  9<L>  /  AlkPhos  111  04-29      
  HPI:     "88 yo female with Hx. of Atrial fib, on warfarin, CHF,  DJD DERIAN, s/p PPM, chronic low back pain, thrombocytosis   was BIBA form home with c/o abdominal pain , radiating into her low back. The patient symptoms started yesterday and got worse, she also has nausea but no vomiting. The patient daughter is present and states the patient had no appetite. 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  "    4/29  pt esen and examined  stated she didnt have any pain today.   hadnt slept well overnight but didnt really know why.      She defers all discussions to her daughter Marialuisa (who also is [facility] on [employer]T).    She defers discussions and decisions to her .         We discussed Palliative Care team being a supportive team when a patient has ongoing illnesses.  We also discussed that it is not an end of life care service, but can help navigate symptoms and emotional support throughout their hospital stay here.        4/30  Patient was seen and examined.  Denies nausea, vomiting, shortness of breath, chest pain, headaches, abdominal pain, constipation   pt is no acute distress comfortable       5/1   pt in NAD  resting comfortably   denies pain at this time   hope is for dc once stable   goasl clear at this time  if pt declines, or is readmitted please reconsult palliative.     Review of Systems:    Anxiety- denies  Depression-denies  Physical Discomfort- in NAD  Dyspnea-denies  Constipation-denies  Diarrhea-denies  Nausea-denies  Vomiting-denies  Anorexia-denies  Weight Loss- denies  Cough-denies  Secretions-denies  Fatigue-denies  Weakness-denies  Delirium-denies    All other systems reviewed and negative       PHYSICAL EXAM:  ICU Vital Signs Last 24 Hrs  T(C): 36.8 (30 Apr 2024 08:12), Max: 36.8 (30 Apr 2024 08:12)  T(F): 98.2 (30 Apr 2024 08:12), Max: 98.2 (30 Apr 2024 08:12)  HR: 60 (30 Apr 2024 08:12) (60 - 60)  BP: 123/55 (30 Apr 2024 08:12) (123/55 - 133/58)  BP(mean): --  ABP: --  ABP(mean): --  RR: 18 (30 Apr 2024 08:12) (18 - 18)  SpO2: 98% (30 Apr 2024 08:12) (98% - 99%)    O2 Parameters below as of 30 Apr 2024 08:12  Patient On (Oxygen Delivery Method): room air         PPSV2: 40%  FAST:    General: calm in NAD  Mental Status: awake alert oriented x3  HEENT: eomi, perrl  Lungs: ctabl b/l bs  Cardiac: s1s2 no mgr  GI: soft nontender +BS  : voids  Ext: moves all 4 extremities spontaneously  Neuro: no gross findings     LABS:                        12.7   19.65 )-----------( 424      ( 29 Apr 2024 06:33 )             40.5     04-29    146<H>  |  117<H>  |  33<H>  ----------------------------<  113<H>  3.6   |  22  |  0.99    Ca    9.7      29 Apr 2024 06:33    TPro  5.7<L>  /  Alb  2.9<L>  /  TBili  0.7  /  DBili  x   /  AST  28  /  ALT  9<L>  /  AlkPhos  111  04-29    PT/INR - ( 29 Apr 2024 06:33 )   PT: 32.8 sec;   INR: 3.00 ratio         PTT - ( 29 Apr 2024 06:33 )  PTT:56.4 sec  Albumin: Albumin: 2.9 g/dL (04-29 @ 06:33)      Allergies    Eliquis (Unknown)  penicillin (Rash)    Intolerances      MEDICATIONS  (STANDING):  cefTRIAXone Injectable. 2000 milliGRAM(s) IV Push every 24 hours  lactated ringers. 1000 milliLiter(s) (75 mL/Hr) IV Continuous <Continuous>  losartan 25 milliGRAM(s) Oral daily  metoprolol succinate ER 50 milliGRAM(s) Oral daily  metroNIDAZOLE  IVPB 500 milliGRAM(s) IV Intermittent every 12 hours    MEDICATIONS  (PRN):  acetaminophen     Tablet .. 650 milliGRAM(s) Oral once PRN Temp greater or equal to 38C (100.4F), Mild Pain (1 - 3)  aluminum hydroxide/magnesium hydroxide/simethicone Suspension 30 milliLiter(s) Oral every 4 hours PRN Dyspepsia  melatonin 3 milliGRAM(s) Oral at bedtime PRN Insomnia  morphine  - Injectable 2 milliGRAM(s) IV Push once PRN Moderate Pain (4 - 6)  ondansetron Injectable 4 milliGRAM(s) IV Push once PRN Nausea and/or Vomiting      RADIOLOGY/ADDITIONAL STUDIES:
Date of service: 04-29-24 @ 13:09    Lying in bed in NAD  Abdomen feels less tender  No fever    ROS: no fever or chills; denies dizziness, no HA, no SOB or cough, no diarrhea or constipation; no dysuria, no legs pain, no rashes    MEDICATIONS  (STANDING):  cefTRIAXone Injectable. 2000 milliGRAM(s) IV Push every 24 hours  lactated ringers. 1000 milliLiter(s) (75 mL/Hr) IV Continuous <Continuous>  losartan 25 milliGRAM(s) Oral daily  metoprolol succinate ER 50 milliGRAM(s) Oral daily  metroNIDAZOLE  IVPB 500 milliGRAM(s) IV Intermittent every 12 hours    Vital Signs Last 24 Hrs  T(C): 36.9 (29 Apr 2024 07:50), Max: 37.1 (28 Apr 2024 20:31)  T(F): 98.4 (29 Apr 2024 07:50), Max: 98.8 (28 Apr 2024 20:31)  HR: 59 (29 Apr 2024 07:50) (59 - 63)  BP: 143/64 (29 Apr 2024 07:50) (140/66 - 143/64)  BP(mean): --  RR: 18 (29 Apr 2024 07:50) (18 - 18)  SpO2: 98% (29 Apr 2024 07:50) (98% - 100%)    Parameters below as of 29 Apr 2024 07:50  Patient On (Oxygen Delivery Method): room air     Physical exam:    Constitutional:  No acute distress  HEENT: NC/AT, EOMI, PERRLA, conjunctivae clear; ears and nose atraumatic  Neck: supple; thyroid not palpable  Back: no tenderness  Respiratory: respiratory effort normal; clear to auscultation  Cardiovascular: S1S2 regular, no murmurs  Abdomen: soft, RUQ tender, not distended, positive BS  Genitourinary: no suprapubic tenderness  Lymphatic: no LN palpable  Musculoskeletal: no muscle tenderness, no joint swelling or tenderness  Extremities: no pedal edema  Neurological/ Psychiatric: Alert, moving all extremities  Skin: no rashes; no palpable lesions    Labs: reviewed                        12.7   19.65 )-----------( 424      ( 29 Apr 2024 06:33 )             40.5     04-29    146<H>  |  117<H>  |  33<H>  ----------------------------<  113<H>  3.6   |  22  |  0.99    Ca    9.7      29 Apr 2024 06:33    TPro  5.7<L>  /  Alb  2.9<L>  /  TBili  0.7  /  DBili  x   /  AST  28  /  ALT  9<L>  /  AlkPhos  111  04-29                        13.3   21.24 )-----------( 470      ( 28 Apr 2024 07:27 )             43.3     04-28    143  |  112<H>  |  35<H>  ----------------------------<  114<H>  4.2   |  23  |  1.18    Ca    10.1      28 Apr 2024 07:27    TPro  7.3  /  Alb  3.8  /  TBili  0.6  /  DBili  x   /  AST  45<H>  /  ALT  17  /  AlkPhos  150<H>  04-27     LIVER FUNCTIONS - ( 27 Apr 2024 09:11 )  Alb: 3.8 g/dL / Pro: 7.3 gm/dL / ALK PHOS: 150 U/L / ALT: 17 U/L / AST: 45 U/L / GGT: x           Culture - Blood (collected 27 Apr 2024 12:22)  Source: .Blood None  Preliminary Report (28 Apr 2024 19:02):    No growth at 24 hours    Culture - Blood (collected 27 Apr 2024 12:22)  Source: .Blood None  Preliminary Report (28 Apr 2024 19:02):    No growth at 24 hours    Culture - Urine (collected 27 Apr 2024 09:11)  Source: Catheterized Catheterized  Preliminary Report (29 Apr 2024 08:03):    50,000 - 99,000 CFU/mL Enterococcus species    Radiology: all available radiological tests reviewed    < from: US Abdomen Upper Quadrant Right (04.27.24 @ 14:44) >  Gallbladder sludge with borderline gallbladder wall thickening and pericholecystic fluid, concerning for cholecystitis, though no gallstones   are identified. Consider further evaluation with nuclear medicine HIDA scan for confirmation.  < end of copied text >    < from: CT Abdomen and Pelvis w/ IV Cont (04.27.24 @ 11:01) >  Mild bilateral micronodular/tree-in-bud opacities, which may reflect   infectious/inflammatory small airway disease.  No lobar pneumonia.  Suggestion of small pericholecystic fluid.  This can be further characterized by right upper quadrant ultrasonography.  Indeterminate nodule right adrenal gland.  Recommend further characterization with nonemergent CT adrenal washout study.  Left hip arthroplasty with possible osteolysis proximal femur.  Recommend comparison with prior postoperative imaging studies.  < end of copied text >    Advanced directives addressed: full resuscitation
HOSPITALIST ATTENDING PROGRESS NOTE    Chart and meds reviewed.      Subjective: Patient seen and examined. resting comfortably. Denies any abdominal pain. HIDA negative. Seen by Surgery and IR, no intervention indicated. Continue IV antibiotics. No fever      Additional results/Imaging, I have personally reviewed:    LABS:                            10.8   11.57 )-----------( 340      ( 01 May 2024 07:50 )             34.9     05-01    143  |  115<H>  |  27<H>  ----------------------------<  89  3.2<L>   |  24  |  0.85    Ca    9.0      01 May 2024 07:50    TPro  4.6<L>  /  Alb  2.3<L>  /  TBili  0.5  /  DBili  x   /  AST  24  /  ALT  8<L>  /  AlkPhos  80  05-01        LIVER FUNCTIONS - ( 01 May 2024 07:50 )  Alb: 2.3 g/dL / Pro: 4.6 gm/dL / ALK PHOS: 80 U/L / ALT: 8 U/L / AST: 24 U/L / GGT: x           PT/INR - ( 01 May 2024 07:50 )   PT: 21.1 sec;   INR: 1.90 ratio         PTT - ( 01 May 2024 07:50 )  PTT:40.2 sec  Urinalysis Basic - ( 01 May 2024 07:50 )    Color: x / Appearance: x / SG: x / pH: x  Gluc: 89 mg/dL / Ketone: x  / Bili: x / Urobili: x   Blood: x / Protein: x / Nitrite: x   Leuk Esterase: x / RBC: x / WBC x   Sq Epi: x / Non Sq Epi: x / Bacteria: x              All other systems reviewed and found to be negative with the exception of what has been described above.    MEDICATIONS  (STANDING):  atorvastatin 10 milliGRAM(s) Oral at bedtime  cefTRIAXone Injectable. 2000 milliGRAM(s) IV Push every 24 hours  lactated ringers. 1000 milliLiter(s) (75 mL/Hr) IV Continuous <Continuous>  losartan 25 milliGRAM(s) Oral daily  metoprolol succinate ER 50 milliGRAM(s) Oral daily  metroNIDAZOLE  IVPB 500 milliGRAM(s) IV Intermittent every 12 hours    MEDICATIONS  (PRN):  acetaminophen     Tablet .. 650 milliGRAM(s) Oral once PRN Temp greater or equal to 38C (100.4F), Mild Pain (1 - 3)  aluminum hydroxide/magnesium hydroxide/simethicone Suspension 30 milliLiter(s) Oral every 4 hours PRN Dyspepsia  melatonin 3 milliGRAM(s) Oral at bedtime PRN Insomnia  ondansetron Injectable 4 milliGRAM(s) IV Push once PRN Nausea and/or Vomiting      VITALS:  T(F): 97.4 (05-01-24 @ 08:22), Max: 97.4 (04-30-24 @ 20:20)  HR: 61 (05-01-24 @ 08:22) (60 - 61)  BP: 108/56 (05-01-24 @ 08:22) (108/56 - 112/64)  RR: 18 (05-01-24 @ 08:22) (18 - 18)  SpO2: 99% (05-01-24 @ 08:22) (97% - 99%)  Wt(kg): --    I&O's Summary      CAPILLARY BLOOD GLUCOSE          PHYSICAL EXAM:  Constitutional:  No acute distress  HEENT: NC/AT, EOMI, PERRLA, conjunctivae clear; ears and nose atraumatic  Neck: supple; thyroid not palpable  Back: no tenderness  Respiratory: respiratory effort normal; clear to auscultation  Cardiovascular: S1S2 regular, no murmurs  Abdomen: soft, nontender, not distended, positive BS  Genitourinary: no suprapubic tenderness  Lymphatic: no LN palpable  Musculoskeletal: no muscle tenderness, no joint swelling or tenderness  Extremities: no pedal edema  Neurological/ Psychiatric: Alert, moving all extremities  Skin: no rashes; no palpable lesions    CULTURES:      Telemetry, personally reviewed
Pt seen, woke up with headache this am    MEDICATIONS  (STANDING):  atorvastatin 10 milliGRAM(s) Oral at bedtime  cefTRIAXone Injectable. 2000 milliGRAM(s) IV Push every 24 hours  lactated ringers. 1000 milliLiter(s) (75 mL/Hr) IV Continuous <Continuous>  losartan 25 milliGRAM(s) Oral daily  metoprolol succinate ER 50 milliGRAM(s) Oral daily  metroNIDAZOLE  IVPB 500 milliGRAM(s) IV Intermittent every 12 hours  warfarin 3 milliGRAM(s) Oral once    MEDICATIONS  (PRN):  aluminum hydroxide/magnesium hydroxide/simethicone Suspension 30 milliLiter(s) Oral every 4 hours PRN Dyspepsia  melatonin 3 milliGRAM(s) Oral at bedtime PRN Insomnia  ondansetron Injectable 4 milliGRAM(s) IV Push once PRN Nausea and/or Vomiting      ROS  headache    Vital Signs Last 24 Hrs  T(C): 36.6 (02 May 2024 08:33), Max: 36.6 (02 May 2024 08:33)  T(F): 97.9 (02 May 2024 08:33), Max: 97.9 (02 May 2024 08:33)  HR: 60 (02 May 2024 08:33) (60 - 60)  BP: 116/70 (02 May 2024 08:33) (100/49 - 116/70)  BP(mean): --  RR: 18 (02 May 2024 08:33) (18 - 18)  SpO2: 96% (02 May 2024 08:33) (96% - 97%)    Parameters below as of 02 May 2024 08:33  Patient On (Oxygen Delivery Method): room air        PE  NAD  Awake, alert     No rash grossly  FROM                          11.0   12.15 )-----------( 340      ( 02 May 2024 06:56 )             35.8       05-02    144  |  117<H>  |  22  ----------------------------<  85  3.9   |  22  |  0.83    Ca    8.7      02 May 2024 06:56    TPro  4.6<L>  /  Alb  2.3<L>  /  TBili  0.5  /  DBili  x   /  AST  24  /  ALT  8<L>  /  AlkPhos  80  05-01      
Date of service: 05-01-24 @ 08:23    Lying in bed in NAD  Has mild abdominal tenderness  Tolerating PO  No fever    ROS: no fever or chills; denies dizziness, no HA, no SOB or cough, no diarrhea or constipation; no dysuria, no legs pain, no rashes    MEDICATIONS  (STANDING):  atorvastatin 10 milliGRAM(s) Oral at bedtime  cefTRIAXone Injectable. 2000 milliGRAM(s) IV Push every 24 hours  lactated ringers. 1000 milliLiter(s) (75 mL/Hr) IV Continuous <Continuous>  losartan 25 milliGRAM(s) Oral daily  metoprolol succinate ER 50 milliGRAM(s) Oral daily  metroNIDAZOLE  IVPB 500 milliGRAM(s) IV Intermittent every 12 hours    Vital Signs Last 24 Hrs  T(C): 36.3 (30 Apr 2024 20:20), Max: 36.3 (30 Apr 2024 20:20)  T(F): 97.4 (30 Apr 2024 20:20), Max: 97.4 (30 Apr 2024 20:20)  HR: 60 (30 Apr 2024 20:20) (60 - 60)  BP: 112/64 (30 Apr 2024 20:20) (112/64 - 112/64)  BP(mean): --  RR: 18 (30 Apr 2024 20:20) (18 - 18)  SpO2: 97% (30 Apr 2024 20:20) (97% - 97%)    Parameters below as of 30 Apr 2024 20:20  Patient On (Oxygen Delivery Method): room air     Physical exam:    Constitutional:  No acute distress  HEENT: NC/AT, EOMI, PERRLA, conjunctivae clear; ears and nose atraumatic  Neck: supple; thyroid not palpable  Back: no tenderness  Respiratory: respiratory effort normal; clear to auscultation  Cardiovascular: S1S2 regular, no murmurs  Abdomen: soft, RUQ tender, not distended, positive BS  Genitourinary: no suprapubic tenderness  Lymphatic: no LN palpable  Musculoskeletal: no muscle tenderness, no joint swelling or tenderness  Extremities: no pedal edema  Neurological/ Psychiatric: Alert, moving all extremities  Skin: no rashes; no palpable lesions    Labs: reviewed                        10.8   11.57 )-----------( 340      ( 01 May 2024 07:50 )             34.9     04-30    146<H>  |  118<H>  |  31<H>  ----------------------------<  128<H>  3.5   |  23  |  0.94    Ca    9.4      30 Apr 2024 10:52                        12.7   19.65 )-----------( 424      ( 29 Apr 2024 06:33 )             40.5     04-29    146<H>  |  117<H>  |  33<H>  ----------------------------<  113<H>  3.6   |  22  |  0.99    Ca    9.7      29 Apr 2024 06:33    TPro  5.7<L>  /  Alb  2.9<L>  /  TBili  0.7  /  DBili  x   /  AST  28  /  ALT  9<L>  /  AlkPhos  111  04-29                        13.3   21.24 )-----------( 470      ( 28 Apr 2024 07:27 )             43.3     04-28    143  |  112<H>  |  35<H>  ----------------------------<  114<H>  4.2   |  23  |  1.18    Ca    10.1      28 Apr 2024 07:27    TPro  7.3  /  Alb  3.8  /  TBili  0.6  /  DBili  x   /  AST  45<H>  /  ALT  17  /  AlkPhos  150<H>  04-27     LIVER FUNCTIONS - ( 27 Apr 2024 09:11 )  Alb: 3.8 g/dL / Pro: 7.3 gm/dL / ALK PHOS: 150 U/L / ALT: 17 U/L / AST: 45 U/L / GGT: x           Culture - Blood (collected 27 Apr 2024 12:22)  Source: .Blood None  Preliminary Report (29 Apr 2024 19:01):    No growth at 48 Hours    Culture - Blood (collected 27 Apr 2024 12:22)  Source: .Blood None  Preliminary Report (29 Apr 2024 19:01):    No growth at 48 Hours    Culture - Urine (collected 27 Apr 2024 09:11)  Source: Catheterized Catheterized  Preliminary Report (29 Apr 2024 23:15):    50,000 - 99,000 CFU/mL Enterococcus faecalis    Radiology: all available radiological tests reviewed    < from: US Abdomen Upper Quadrant Right (04.27.24 @ 14:44) >  Gallbladder sludge with borderline gallbladder wall thickening and pericholecystic fluid, concerning for cholecystitis, though no gallstones   are identified. Consider further evaluation with nuclear medicine HIDA scan for confirmation.  < end of copied text >    < from: CT Abdomen and Pelvis w/ IV Cont (04.27.24 @ 11:01) >  Mild bilateral micronodular/tree-in-bud opacities, which may reflect   infectious/inflammatory small airway disease.  No lobar pneumonia.  Suggestion of small pericholecystic fluid.  This can be further characterized by right upper quadrant ultrasonography.  Indeterminate nodule right adrenal gland.  Recommend further characterization with nonemergent CT adrenal washout study.  Left hip arthroplasty with possible osteolysis proximal femur.  Recommend comparison with prior postoperative imaging studies.  < end of copied text >    < from: NM Hepatobiliary Imaging (04.29.24 @ 14:20) >  IMPRESSION: Normal hepatobiliary scan.  No evidence of acute cholecystitis or biliary obstruction.  < end of copied text >      Advanced directives addressed: full resuscitation
HOSPITALIST ATTENDING PROGRESS NOTE     Chart and meds reviewed. Patient seen and examined     CC: abdominal pain , cholecystitis    Subjective: Pt seen and evaluated. Still with RUQ abdominal pain with radiation to the back. Denies nausea/vomiting. No other acute complaints.   Daughter at bedside       All other systems and founds to be negative with exception of what has been described above.         PHYSICAL EXAM:  Vital Signs Last 24 Hrs  T(C): 36.8 (2024 08:52), Max: 36.8 (2024 21:45)  T(F): 98.3 (2024 08:52), Max: 98.3 (2024 08:52)  HR: 60 (2024 08:52) (37 - 75)  BP: 144/57 (2024 08:52) (138/49 - 149/52)  RR: 17 (2024 08:52) (17 - 19)  SpO2: 95% (2024 08:52) (95% - 100%)    Parameters below as of 2024 08:52  Patient On (Oxygen Delivery Method): room air      Daily Height in cm: 160.02 (2024 18:10)    Daily Weight in k.5 (2024 05:56)    GEN: NAD, +frail   HEENT: EOMI,  moist mucous membranes, +tenderness of the left maxilla, +necrosis   NECK : Soft and supple, no JVD  LUNG: CTABL, No wheezing, rales or rhonchi  CVS: S1S2+, RRR, no M/G/R  GI: BS+, +ttp over the RUQ, +Drake's   EXTREMITIES: No peripheral edema  VASCULAR: 2+ peripheral pulses  NEURO: AAOx3, grossly non-focal   SKIN: +ecchymotic     HOME MEDICATIONS:  Home Medications:  allopurinol 100 mg oral tablet: 1 tab(s) orally 2 times a day (2024 11:51)  ammonium lactate 12% topical lotion: Apply topically to affected area once a day (2024 16:18)  chlorhexidine 0.12% mucous membrane liquid: 10 milliliter(s) orally 2 times a day *** STOPPED TAKING 3 DAYS AGO*** (2024 16:18)  folic acid 1 mg oral tablet: 1 tab(s) orally once a day (2024 11:51)  furosemide 20 mg oral tablet: 1 tab(s) orally once a day (2024 16:18)  losartan 25 mg oral tablet: 1 tab(s) orally once a day (2024 11:51)  methotrexate 2.5 mg oral tablet: 4 tab(s) orally once a week *** STOPPED TAKING MED A WEEK AGO*** (2024 16:18)  Metoprolol Succinate ER 50 mg oral tablet, extended release: 1 tab(s) orally once a day (2024 11:51)  montelukast 10 mg oral tablet: 1 tab(s) orally once a day (2024 11:51)  Multiple Vitamins oral tablet: 1 tab(s) orally once a day (2024 11:51)  potassium chloride 10 mEq oral tablet, extended release: 1 tab(s) orally once a day (2024 16:18)  pravastatin 20 mg oral tablet: 1 tab(s) orally once a day (in the evening) (2024 16:18)  Tylenol 500 mg oral tablet: 2 tab(s) orally every 8 hours as needed for (2024 16:18)  warfarin 2 mg oral tablet: 1 tab(s) orally once a day (2024 16:18)  ZyrTEC 10 mg oral tablet: 1 tab(s) orally once a day (2024 16:18)      MEDICATIONS  MEDICATIONS  (STANDING):  cefTRIAXone Injectable. 2000 milliGRAM(s) IV Push every 24 hours  metroNIDAZOLE  IVPB 500 milliGRAM(s) IV Intermittent every 12 hours  sodium chloride 0.9%. 1000 milliLiter(s) (75 mL/Hr) IV Continuous <Continuous>      LABS: All Labs Reviewed:                        13.3   24 )-----------( 470      ( 2024 07:27 )             43.3     04-28    143  |  112<H>  |  35<H>  ----------------------------<  114<H>  4.2   |  23  |  1.18    Ca    10.1      2024 07:27    TPro  7.3  /  Alb  3.8  /  TBili  0.6  /  DBili  x   /  AST  45<H>  /  ALT  17  /  AlkPhos  150<H>      PT/INR - ( 2024 07:27 )   PT: 51.6 sec;   INR: 4.78 ratio         PTT - ( 2024 07:27 )  PTT:81.3 sec    Urinalysis Basic - ( 2024 07:27 )    Color: x / Appearance: x / SG: x / pH: x  Gluc: 114 mg/dL / Ketone: x  / Bili: x / Urobili: x   Blood: x / Protein: x / Nitrite: x   Leuk Esterase: x / RBC: x / WBC x   Sq Epi: x / Non Sq Epi: x / Bacteria: x        Blood Culture:   I&O's Detail    CAPILLARY BLOOD GLUCOSE            CARDIOLOGY TESTING   CARDIAC MARKERS ( 2024 09:11 )  x     / x     / 88 U/L / x     / x          EKG: reviewed     ECHO:       RADIOLOGY  < from: CT Chest w/ IV Cont (24 @ 11:01) >  CHEST:    LUNGS AND LARGE AIRWAYS: PLEURA:    There are mild bilateral micronodular tree-in-bud nodular opacities,   which may reflect inflammatory/infectious small airway disease.  No lobar lung consolidation.  The central airways are patent.    No pleural effusion or pneumothorax.    VESSELS: Atherosclerotic changes thoracic aorta and coronary artery   calcifications and/or stents.    HEART:  Cardiomegaly.  Cardiac pacemaker leads.   No pericardial effusion.    MEDIASTINUM AND PRITI:  Subcentimeter short axis precarinal lymph node.    CHEST WALL AND LOWER NECK:  Heterogeneous enlarged left lobe of the thyroid gland with substernal   extension displacing the trachea to the right, similar to prior exam.  Right cardiac chest wall device.    ABDOMEN AND PELVIS:    LIVER:  Subcentimeter hypodense lesion left hepatic lobe is too small for   characterization.  BILE DUCTS: Normal caliber.  GALLBLADDER: Small pericholecystic fluid.  SPLEEN: Within normal limits.  PANCREAS: Within normal limits.  ADRENALS: 1.5 cm low-density nodule medial limb right adrenal gland.  KIDNEYS/URETERS: Within normal limits.    Metallic streak artifact related to patient's left hip arthroplasty   degrades image quality limiting evaluation in the pelvis.    BLADDER: Unremarkable as visualized.  REPRODUCTIVE ORGANS: Small calcified uterine fibroids.    BOWEL:  No bowel obstruction.  Appendix is normal.  PERITONEUM: No ascites.    VESSELS: Atherosclerotic changes; abdominal aorta is normal in caliber.  RETROPERITONEUM/LYMPH NODES: No lymphadenopathy.    ABDOMINAL WALL: Within normal limits.    BONES:  Degenerative changes spine.  Age indeterminate compression deformities at L3, L4 and L5 vertebral   bodies.  Partially imaged is left total hip arthroplasty.  There is lucency surrounding the proximal metal bone interface, which   could reflect osteolysis.    IMPRESSION:    Mild bilateral micronodular/tree-in-bud opacities, which may reflect   infectious/inflammatory small airway disease.  No lobar pneumonia.    Suggestion of small pericholecystic fluid.  This can be further characterized by right upper quadrant ultrasonography.    Indeterminate nodule right adrenal gland.  Recommend further characterization with nonemergent CT adrenal washout   study.    Left hip arthroplasty withpossible osteolysis proximal femur.  Recommend comparison with prior postoperative imaging studies.    < end of copied text >  
HOSPITALIST ATTENDING PROGRESS NOTE    Chart and meds reviewed.      Subjective: Patient seen and examined. Resting comfortably. Denies abdominal pain did reports headache this AM.       Additional results/Imaging, I have personally reviewed:    LABS:                            11.0   12.15 )-----------( 340      ( 02 May 2024 06:56 )             35.8     05-02    144  |  117<H>  |  22  ----------------------------<  85  3.9   |  22  |  0.83    Ca    8.7      02 May 2024 06:56    TPro  4.6<L>  /  Alb  2.3<L>  /  TBili  0.5  /  DBili  x   /  AST  24  /  ALT  8<L>  /  AlkPhos  80  05-01        LIVER FUNCTIONS - ( 01 May 2024 07:50 )  Alb: 2.3 g/dL / Pro: 4.6 gm/dL / ALK PHOS: 80 U/L / ALT: 8 U/L / AST: 24 U/L / GGT: x           PT/INR - ( 02 May 2024 06:56 )   PT: 20.0 sec;   INR: 1.80 ratio         PTT - ( 02 May 2024 06:56 )  PTT:38.3 sec  Urinalysis Basic - ( 02 May 2024 06:56 )    Color: x / Appearance: x / SG: x / pH: x  Gluc: 85 mg/dL / Ketone: x  / Bili: x / Urobili: x   Blood: x / Protein: x / Nitrite: x   Leuk Esterase: x / RBC: x / WBC x   Sq Epi: x / Non Sq Epi: x / Bacteria: x              All other systems reviewed and found to be negative with the exception of what has been described above.    MEDICATIONS  (STANDING):  atorvastatin 10 milliGRAM(s) Oral at bedtime  cefTRIAXone Injectable. 2000 milliGRAM(s) IV Push every 24 hours  lactated ringers. 1000 milliLiter(s) (75 mL/Hr) IV Continuous <Continuous>  losartan 25 milliGRAM(s) Oral daily  metoprolol succinate ER 50 milliGRAM(s) Oral daily  metroNIDAZOLE  IVPB 500 milliGRAM(s) IV Intermittent every 12 hours  warfarin 3 milliGRAM(s) Oral once    MEDICATIONS  (PRN):  aluminum hydroxide/magnesium hydroxide/simethicone Suspension 30 milliLiter(s) Oral every 4 hours PRN Dyspepsia  melatonin 3 milliGRAM(s) Oral at bedtime PRN Insomnia  ondansetron Injectable 4 milliGRAM(s) IV Push once PRN Nausea and/or Vomiting      VITALS:  T(F): 97.9 (05-02-24 @ 08:33), Max: 97.9 (05-02-24 @ 08:33)  HR: 60 (05-02-24 @ 08:33) (60 - 60)  BP: 116/70 (05-02-24 @ 08:33) (100/49 - 116/70)  RR: 18 (05-02-24 @ 08:33) (18 - 18)  SpO2: 96% (05-02-24 @ 08:33) (96% - 97%)  Wt(kg): --    I&O's Summary      CAPILLARY BLOOD GLUCOSE          PHYSICAL EXAM:  Gen: No acute distress   HEENT:  pupils equal and reactive, EOMI,   NECK:   supple, no carotid bruits, No JVD  CV:  +S1, +S2, regular rate rhythm, no murmurs or rubs  RESP:   lungs clear to auscultation bilaterally, no wheezing, rales, rhonchi, good air entry bilaterally  GI:  abdomen soft, non-tender, non-distended, normal BS, no bruits, no abdominal masses, no palpable masses  MSK:   normal muscle tone, no atrophy, no rigidity, no contractions  EXT:  no clubbing, no cyanosis, no edema, no calf pain, swelling or erythema  VASCULAR:  pulses equal and symmetric in the upper and lower extremities  NEURO:  AAOX3, no focal neurological deficits, follows all commands, able to move extremities spontaneously  SKIN:  no ulcers, lesions or rashes      CULTURES:      Telemetry, personally reviewed
Date of service: 04-30-24 @ 09:56    Lying in bed in NAD  Hs upper abdomen discomfort  Tolerating PO intake    ROS: no fever or chills; denies dizziness, no HA, no SOB or cough, no diarrhea or constipation; no dysuria, no legs pain, no rashes    MEDICATIONS  (STANDING):  cefTRIAXone Injectable. 2000 milliGRAM(s) IV Push every 24 hours  lactated ringers. 1000 milliLiter(s) (75 mL/Hr) IV Continuous <Continuous>  losartan 25 milliGRAM(s) Oral daily  metoprolol succinate ER 50 milliGRAM(s) Oral daily  metroNIDAZOLE  IVPB 500 milliGRAM(s) IV Intermittent every 12 hours    Vital Signs Last 24 Hrs  T(C): 36.8 (30 Apr 2024 08:12), Max: 36.8 (30 Apr 2024 08:12)  T(F): 98.2 (30 Apr 2024 08:12), Max: 98.2 (30 Apr 2024 08:12)  HR: 60 (30 Apr 2024 08:12) (60 - 60)  BP: 123/55 (30 Apr 2024 08:12) (123/55 - 133/58)  BP(mean): --  RR: 18 (30 Apr 2024 08:12) (18 - 18)  SpO2: 98% (30 Apr 2024 08:12) (98% - 99%)    Parameters below as of 30 Apr 2024 08:12  Patient On (Oxygen Delivery Method): room air     Physical exam:    Constitutional:  No acute distress  HEENT: NC/AT, EOMI, PERRLA, conjunctivae clear; ears and nose atraumatic  Neck: supple; thyroid not palpable  Back: no tenderness  Respiratory: respiratory effort normal; clear to auscultation  Cardiovascular: S1S2 regular, no murmurs  Abdomen: soft, RUQ tender, not distended, positive BS  Genitourinary: no suprapubic tenderness  Lymphatic: no LN palpable  Musculoskeletal: no muscle tenderness, no joint swelling or tenderness  Extremities: no pedal edema  Neurological/ Psychiatric: Alert, moving all extremities  Skin: no rashes; no palpable lesions    Labs: reviewed                        12.7   19.65 )-----------( 424      ( 29 Apr 2024 06:33 )             40.5     04-29    146<H>  |  117<H>  |  33<H>  ----------------------------<  113<H>  3.6   |  22  |  0.99    Ca    9.7      29 Apr 2024 06:33    TPro  5.7<L>  /  Alb  2.9<L>  /  TBili  0.7  /  DBili  x   /  AST  28  /  ALT  9<L>  /  AlkPhos  111  04-29                        13.3   21.24 )-----------( 470      ( 28 Apr 2024 07:27 )             43.3     04-28    143  |  112<H>  |  35<H>  ----------------------------<  114<H>  4.2   |  23  |  1.18    Ca    10.1      28 Apr 2024 07:27    TPro  7.3  /  Alb  3.8  /  TBili  0.6  /  DBili  x   /  AST  45<H>  /  ALT  17  /  AlkPhos  150<H>  04-27     LIVER FUNCTIONS - ( 27 Apr 2024 09:11 )  Alb: 3.8 g/dL / Pro: 7.3 gm/dL / ALK PHOS: 150 U/L / ALT: 17 U/L / AST: 45 U/L / GGT: x           Culture - Blood (collected 27 Apr 2024 12:22)  Source: .Blood None  Preliminary Report (29 Apr 2024 19:01):    No growth at 48 Hours    Culture - Blood (collected 27 Apr 2024 12:22)  Source: .Blood None  Preliminary Report (29 Apr 2024 19:01):    No growth at 48 Hours    Culture - Urine (collected 27 Apr 2024 09:11)  Source: Catheterized Catheterized  Preliminary Report (29 Apr 2024 23:15):    50,000 - 99,000 CFU/mL Enterococcus faecalis    Radiology: all available radiological tests reviewed    < from: US Abdomen Upper Quadrant Right (04.27.24 @ 14:44) >  Gallbladder sludge with borderline gallbladder wall thickening and pericholecystic fluid, concerning for cholecystitis, though no gallstones   are identified. Consider further evaluation with nuclear medicine HIDA scan for confirmation.  < end of copied text >    < from: CT Abdomen and Pelvis w/ IV Cont (04.27.24 @ 11:01) >  Mild bilateral micronodular/tree-in-bud opacities, which may reflect   infectious/inflammatory small airway disease.  No lobar pneumonia.  Suggestion of small pericholecystic fluid.  This can be further characterized by right upper quadrant ultrasonography.  Indeterminate nodule right adrenal gland.  Recommend further characterization with nonemergent CT adrenal washout study.  Left hip arthroplasty with possible osteolysis proximal femur.  Recommend comparison with prior postoperative imaging studies.  < end of copied text >    < from: NM Hepatobiliary Imaging (04.29.24 @ 14:20) >  IMPRESSION: Normal hepatobiliary scan.  No evidence of acute cholecystitis or biliary obstruction.  < end of copied text >      Advanced directives addressed: full resuscitation
HOSPITALIST ATTENDING PROGRESS NOTE     Chart and meds reviewed. Patient seen and examined     CC: abdominal pain , cholecystitis    Subjective: Pt seen and evaluated. Still with RUQ abdominal pain with radiation to the back. Denies nausea/vomiting. No other acute complaints.   Daughter at bedside     : Pt seen and evaluated. Still feels weak but with poor appetite. No other complaints. HIDA today       All other systems and founds to be negative with exception of what has been described above.         PHYSICAL EXAM:  Vital Signs Last 24 Hrs  T(C): 36.9 (2024 07:50), Max: 37.1 (2024 20:31)  T(F): 98.4 (2024 07:50), Max: 98.8 (2024 20:31)  HR: 59 (2024 07:50) (59 - 63)  BP: 143/64 (2024 07:50) (140/66 - 143/64)  RR: 18 (2024 07:50) (18 - 18)  SpO2: 98% (2024 07:50) (98% - 100%)    Parameters below as of 2024 07:50  Patient On (Oxygen Delivery Method): room air          Daily Height in cm: 160.02 (2024 18:10)    Daily Weight in k.5 (2024 05:56)    GEN: NAD, +frail   HEENT: EOMI,  moist mucous membranes, +tenderness of the left maxilla, +necrosis   NECK : Soft and supple, no JVD  LUNG: CTABL, No wheezing, rales or rhonchi  CVS: S1S2+, RRR, no M/G/R  GI: BS+, minimal tenderness, RUQ pain has improved.   EXTREMITIES: No peripheral edema  VASCULAR: 2+ peripheral pulses  NEURO: AAOx3, grossly non-focal   SKIN: +ecchymotic     HOME MEDICATIONS:  Home Medications:  allopurinol 100 mg oral tablet: 1 tab(s) orally 2 times a day (2024 11:51)  ammonium lactate 12% topical lotion: Apply topically to affected area once a day (2024 16:18)  chlorhexidine 0.12% mucous membrane liquid: 10 milliliter(s) orally 2 times a day *** STOPPED TAKING 3 DAYS AGO*** (2024 16:18)  folic acid 1 mg oral tablet: 1 tab(s) orally once a day (2024 11:51)  furosemide 20 mg oral tablet: 1 tab(s) orally once a day (2024 16:18)  losartan 25 mg oral tablet: 1 tab(s) orally once a day (2024 11:51)  methotrexate 2.5 mg oral tablet: 4 tab(s) orally once a week *** STOPPED TAKING MED A WEEK AGO*** (2024 16:18)  Metoprolol Succinate ER 50 mg oral tablet, extended release: 1 tab(s) orally once a day (2024 11:51)  montelukast 10 mg oral tablet: 1 tab(s) orally once a day (2024 11:51)  Multiple Vitamins oral tablet: 1 tab(s) orally once a day (2024 11:51)  potassium chloride 10 mEq oral tablet, extended release: 1 tab(s) orally once a day (2024 16:18)  pravastatin 20 mg oral tablet: 1 tab(s) orally once a day (in the evening) (2024 16:18)  Tylenol 500 mg oral tablet: 2 tab(s) orally every 8 hours as needed for (2024 16:18)  warfarin 2 mg oral tablet: 1 tab(s) orally once a day (2024 16:18)  ZyrTEC 10 mg oral tablet: 1 tab(s) orally once a day (2024 16:18)      MEDICATIONS  MEDICATIONS  (STANDING):  cefTRIAXone Injectable. 2000 milliGRAM(s) IV Push every 24 hours  lactated ringers. 1000 milliLiter(s) (75 mL/Hr) IV Continuous <Continuous>  losartan 25 milliGRAM(s) Oral daily  metoprolol succinate ER 50 milliGRAM(s) Oral daily  metroNIDAZOLE  IVPB 500 milliGRAM(s) IV Intermittent every 12 hours    MEDICATIONS  (PRN):  acetaminophen     Tablet .. 650 milliGRAM(s) Oral once PRN Temp greater or equal to 38C (100.4F), Mild Pain (1 - 3)  aluminum hydroxide/magnesium hydroxide/simethicone Suspension 30 milliLiter(s) Oral every 4 hours PRN Dyspepsia  melatonin 3 milliGRAM(s) Oral at bedtime PRN Insomnia  ondansetron Injectable 4 milliGRAM(s) IV Push once PRN Nausea and/or Vomiting        LABS: All Labs Reviewed:                        12.7   19.65 )-----------( 424      ( 2024 06:33 )             40.5       146<H>  |  117<H>  |  33<H>  ----------------------------<  113<H>  3.6   |  22  |  0.99    Ca    9.7      2024 06:33    TPro  5.7<L>  /  Alb  2.9<L>  /  TBili  0.7  /  DBili  x   /  AST  28  /  ALT  9<L>  /  AlkPhos  111                 Urinalysis Basic - ( 2024 06:33 )    Color: x / Appearance: x / SG: x / pH: x  Gluc: 113 mg/dL / Ketone: x  / Bili: x / Urobili: x   Blood: x / Protein: x / Nitrite: x   Leuk Esterase: x / RBC: x / WBC x   Sq Epi: x / Non Sq Epi: x / Bacteria: x    Culture - Urine (24 @ 09:11)    Specimen Source: Catheterized Catheterized   Culture Results:   50,000 - 99,000 CFU/mL Enterococcus species    Culture - Blood (24 @ 12:22)    Specimen Source: .Blood None   Culture Results:   No growth at 24 hours    Culture - Blood (24 @ 12:22)    Specimen Source: .Blood None   Culture Results:   No growth at 24 hours            I&O's Detail    CAPILLARY BLOOD GLUCOSE  CAPILLARY BLOOD GLUCOSE                  CARDIOLOGY TESTING   CARDIAC MARKERS ( 2024 09:11 )  x     / x     / 88 U/L / x     / x          EKG: reviewed     ECHO:       RADIOLOGY  < from: CT Chest w/ IV Cont (24 @ 11:01) >  CHEST:    LUNGS AND LARGE AIRWAYS: PLEURA:    There are mild bilateral micronodular tree-in-bud nodular opacities,   which may reflect inflammatory/infectious small airway disease.  No lobar lung consolidation.  The central airways are patent.    No pleural effusion or pneumothorax.    VESSELS: Atherosclerotic changes thoracic aorta and coronary artery   calcifications and/or stents.    HEART:  Cardiomegaly.  Cardiac pacemaker leads.   No pericardial effusion.    MEDIASTINUM AND PRITI:  Subcentimeter short axis precarinal lymph node.    CHEST WALL AND LOWER NECK:  Heterogeneous enlarged left lobe of the thyroid gland with substernal   extension displacing the trachea to the right, similar to prior exam.  Right cardiac chest wall device.    ABDOMEN AND PELVIS:    LIVER:  Subcentimeter hypodense lesion left hepatic lobe is too small for   characterization.  BILE DUCTS: Normal caliber.  GALLBLADDER: Small pericholecystic fluid.  SPLEEN: Within normal limits.  PANCREAS: Within normal limits.  ADRENALS: 1.5 cm low-density nodule medial limb right adrenal gland.  KIDNEYS/URETERS: Within normal limits.    Metallic streak artifact related to patient's left hip arthroplasty   degrades image quality limiting evaluation in the pelvis.    BLADDER: Unremarkable as visualized.  REPRODUCTIVE ORGANS: Small calcified uterine fibroids.    BOWEL:  No bowel obstruction.  Appendix is normal.  PERITONEUM: No ascites.    VESSELS: Atherosclerotic changes; abdominal aorta is normal in caliber.  RETROPERITONEUM/LYMPH NODES: No lymphadenopathy.    ABDOMINAL WALL: Within normal limits.    BONES:  Degenerative changes spine.  Age indeterminate compression deformities at L3, L4 and L5 vertebral   bodies.  Partially imaged is left total hip arthroplasty.  There is lucency surrounding the proximal metal bone interface, which   could reflect osteolysis.    IMPRESSION:    Mild bilateral micronodular/tree-in-bud opacities, which may reflect   infectious/inflammatory small airway disease.  No lobar pneumonia.    Suggestion of small pericholecystic fluid.  This can be further characterized by right upper quadrant ultrasonography.    Indeterminate nodule right adrenal gland.  Recommend further characterization with nonemergent CT adrenal washout   study.    Left hip arthroplasty withpossible osteolysis proximal femur.  Recommend comparison with prior postoperative imaging studies.    < end of copied text >  
Pt seen, more alert today, reports symptoms are improving    MEDICATIONS  (STANDING):  atorvastatin 10 milliGRAM(s) Oral at bedtime  cefTRIAXone Injectable. 2000 milliGRAM(s) IV Push every 24 hours  lactated ringers. 1000 milliLiter(s) (75 mL/Hr) IV Continuous <Continuous>  losartan 25 milliGRAM(s) Oral daily  metoprolol succinate ER 50 milliGRAM(s) Oral daily  metroNIDAZOLE  IVPB 500 milliGRAM(s) IV Intermittent every 12 hours  warfarin 2 milliGRAM(s) Oral once    MEDICATIONS  (PRN):  acetaminophen     Tablet .. 650 milliGRAM(s) Oral once PRN Temp greater or equal to 38C (100.4F), Mild Pain (1 - 3)  aluminum hydroxide/magnesium hydroxide/simethicone Suspension 30 milliLiter(s) Oral every 4 hours PRN Dyspepsia  melatonin 3 milliGRAM(s) Oral at bedtime PRN Insomnia  ondansetron Injectable 4 milliGRAM(s) IV Push once PRN Nausea and/or Vomiting      ROS  No fever, sweats, chills      Vital Signs Last 24 Hrs  T(C): 36.8 (30 Apr 2024 08:12), Max: 36.8 (30 Apr 2024 08:12)  T(F): 98.2 (30 Apr 2024 08:12), Max: 98.2 (30 Apr 2024 08:12)  HR: 60 (30 Apr 2024 08:12) (60 - 60)  BP: 123/55 (30 Apr 2024 08:12) (123/55 - 133/58)  BP(mean): --  RR: 18 (30 Apr 2024 08:12) (18 - 18)  SpO2: 98% (30 Apr 2024 08:12) (98% - 99%)    Parameters below as of 30 Apr 2024 08:12  Patient On (Oxygen Delivery Method): room air        PE  NAD  Awake, alert    No rash grossly  FROM                          11.4   14.72 )-----------( 376      ( 30 Apr 2024 10:52 )             37.1       04-30    146<H>  |  118<H>  |  31<H>  ----------------------------<  128<H>  3.5   |  23  |  0.94    Ca    9.4      30 Apr 2024 10:52    TPro  5.7<L>  /  Alb  2.9<L>  /  TBili  0.7  /  DBili  x   /  AST  28  /  ALT  9<L>  /  AlkPhos  111  04-29      
  HPI:     "88 yo female with Hx. of Atrial fib, on warfarin, CHF,  DJD DERIAN, s/p PPM, chronic low back pain, thrombocytosis   was BIBA form home with c/o abdominal pain , radiating into her low back. The patient symptoms started yesterday and got worse, she also has nausea but no vomiting. The patient daughter is present and states the patient had no appetite. 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  "    4/29  pt esen and examined  stated she didnt have any pain today.   hadnt slept well overnight but didnt really know why.      She defers all discussions to her daughter Marialuisa (who also is [facility] on charT).    She defers discussions and decisions to her .         We discussed Palliative Care team being a supportive team when a patient has ongoing illnesses.  We also discussed that it is not an end of life care service, but can help navigate symptoms and emotional support throughout their hospital stay here.        4/30  Patient was seen and examined.  Denies nausea, vomiting, shortness of breath, chest pain, headaches, abdominal pain, constipation   pt is no acute distress comfortable     Review of Systems:    Anxiety- denies  Depression-denies  Physical Discomfort- in NAD  Dyspnea-denies  Constipation-denies  Diarrhea-denies  Nausea-denies  Vomiting-denies  Anorexia-denies  Weight Loss- denies  Cough-denies  Secretions-denies  Fatigue-denies  Weakness-denies  Delirium-denies    All other systems reviewed and negative       PHYSICAL EXAM:  ICU Vital Signs Last 24 Hrs  T(C): 36.8 (30 Apr 2024 08:12), Max: 36.8 (30 Apr 2024 08:12)  T(F): 98.2 (30 Apr 2024 08:12), Max: 98.2 (30 Apr 2024 08:12)  HR: 60 (30 Apr 2024 08:12) (60 - 60)  BP: 123/55 (30 Apr 2024 08:12) (123/55 - 133/58)  BP(mean): --  ABP: --  ABP(mean): --  RR: 18 (30 Apr 2024 08:12) (18 - 18)  SpO2: 98% (30 Apr 2024 08:12) (98% - 99%)    O2 Parameters below as of 30 Apr 2024 08:12  Patient On (Oxygen Delivery Method): room air         PPSV2: 40%  FAST:    General: calm in NAD  Mental Status: awake alert oriented x3  HEENT: eomi, perrl  Lungs: ctabl b/l bs  Cardiac: s1s2 no mgr  GI: soft nontender +BS  : voids  Ext: moves all 4 extremities spontaneously  Neuro: no gross findings     LABS:                        12.7   19.65 )-----------( 424      ( 29 Apr 2024 06:33 )             40.5     04-29    146<H>  |  117<H>  |  33<H>  ----------------------------<  113<H>  3.6   |  22  |  0.99    Ca    9.7      29 Apr 2024 06:33    TPro  5.7<L>  /  Alb  2.9<L>  /  TBili  0.7  /  DBili  x   /  AST  28  /  ALT  9<L>  /  AlkPhos  111  04-29    PT/INR - ( 29 Apr 2024 06:33 )   PT: 32.8 sec;   INR: 3.00 ratio         PTT - ( 29 Apr 2024 06:33 )  PTT:56.4 sec  Albumin: Albumin: 2.9 g/dL (04-29 @ 06:33)      Allergies    Eliquis (Unknown)  penicillin (Rash)    Intolerances      MEDICATIONS  (STANDING):  cefTRIAXone Injectable. 2000 milliGRAM(s) IV Push every 24 hours  lactated ringers. 1000 milliLiter(s) (75 mL/Hr) IV Continuous <Continuous>  losartan 25 milliGRAM(s) Oral daily  metoprolol succinate ER 50 milliGRAM(s) Oral daily  metroNIDAZOLE  IVPB 500 milliGRAM(s) IV Intermittent every 12 hours    MEDICATIONS  (PRN):  acetaminophen     Tablet .. 650 milliGRAM(s) Oral once PRN Temp greater or equal to 38C (100.4F), Mild Pain (1 - 3)  aluminum hydroxide/magnesium hydroxide/simethicone Suspension 30 milliLiter(s) Oral every 4 hours PRN Dyspepsia  melatonin 3 milliGRAM(s) Oral at bedtime PRN Insomnia  morphine  - Injectable 2 milliGRAM(s) IV Push once PRN Moderate Pain (4 - 6)  ondansetron Injectable 4 milliGRAM(s) IV Push once PRN Nausea and/or Vomiting      RADIOLOGY/ADDITIONAL STUDIES:

## 2024-05-02 NOTE — PROGRESS NOTE ADULT - ASSESSMENT
89 yo female with Hx. of Atrial fib, on warfarin, CHF,  DJD DERIAN, s/p PPM, chronic low back pain, thrombocytosis   was BIBA form home with c/o abdominal pain , radiating into her low back. The patient symptoms started yesterday and got worse, she also has nausea but no vomiting. The patient daughter is present and states the patient had no appetite. 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       #Sepsis, PoA, Abdominal pain with nausea/vomiting, possible acute acalculous cholecystitis/acute cholangitis   -CT abdomen/Pelvis: as above   -US abdomen: as above   -HIDA: No evidence of acute cholecystitis or biliary obstruction.  -IV fluids   -d/c Cefepime   -on ceftriaxone 2 gm IV qd and metronidazole 500 mg IV q12h since 4/28/24   change iv antibiotics to ceftin 500 mg PO q12h and metrnidazle 500 mg PO q12h for 10 more days - WBC mild increase today, will hold off on PO transition for additional day.  -BCx: NGTD   -GI eval appreciated: no role for ERCP   -Surgery eval noted  -IR consult - -HIDA scan with no evidence of acute cholecystitis or biliary obstruction. No  intervention warranted at this time. Findings reviewed with Dr. Stephenson  -ID following   #CAD s/p PCI   -Metoprolol Succinate   -Losartan   -c/w stating     #Chronic Afib on Coumadin, Suprathereputic INR  -s/p 1x dose of Vitamin K   -INR: 2.03  -resume Coumadin 2mg daily- but will give 3mg today.   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

## 2024-05-02 NOTE — CHART NOTE - NSCHARTNOTEFT_GEN_A_CORE
Dietitian Brief Note    RD consulted as per pt's daughter request. Initial assessment completed 4/28 and pt dx'd w/ PCM. Seen by RD 4/30 as per pt's dtr request, pt preferences obtained - dtr reporting pt needs foods extra creamy 2/2 osteonecrosis of jaw. RD notified kitchen of pt's needs and edited pt's meal tickets. 5/2 dtr reporting continued issues with meal trays regarding consistency and temperature of pureed foods. RD discussed with kitchen staff.     Ora Roland, MS, RDN, [phone]

## 2024-05-02 NOTE — PROGRESS NOTE ADULT - NUTRITIONAL ASSESSMENT
This patient has been assessed with a concern for Malnutrition and has been determined to have a diagnosis/diagnoses of Severe protein-calorie malnutrition and Underweight (BMI < 19).    This patient is being managed with:   Diet Pureed-  Entered: Apr 29 2024  3:41PM  

## 2024-05-02 NOTE — PROGRESS NOTE ADULT - ASSESSMENT
1. essential thrombocytosis  platelets reasonably controlled  hold off on hyrea for now  plts continue to be stable    2. possible cholecystitis  hida negative  ir and d surg no intervention  ID following and transitioned to flagyl/ceftriaxone with symptom improvement      3. 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 hyperbaric oxygen as OP      Thank you for the courtesy of this consultation and we will continue to follow.    Jesse Woodard MD  [facility]  Cell: [phone]

## 2024-05-02 NOTE — CHART NOTE - NSCHARTNOTEFT_GEN_A_CORE
Xavier HERNANDEZ reached out to pt's daughter Marialuisa ([phone]) to follow up and offer support. She explains that they are awaiting for her mother's numbers to stabilize and continuing antibiotics. She expressed disappointment that pt is unable to utilize the hyperbaric chamber while in the hospital but instead needs to wait until she is outpatient. Marialuisa shares that she does not have any follow up questions yet. SW relayed availability and emotional support provided. Our team will continue to follow.

## 2024-05-02 NOTE — PROGRESS NOTE ADULT - REASON FOR ADMISSION
abdominal pain , cholecystitis

## 2024-05-03 ENCOUNTER — TRANSCRIPTION ENCOUNTER (OUTPATIENT)
Age: 89
End: 2024-05-03

## 2024-05-03 VITALS
DIASTOLIC BLOOD PRESSURE: 60 MMHG | OXYGEN SATURATION: 100 % | SYSTOLIC BLOOD PRESSURE: 128 MMHG | TEMPERATURE: 98 F | RESPIRATION RATE: 18 BRPM | HEART RATE: 59 BPM

## 2024-05-03 LAB
ANION GAP SERPL CALC-SCNC: 6 MMOL/L — SIGNIFICANT CHANGE UP (ref 5–17)
APTT BLD: 34.7 SEC — SIGNIFICANT CHANGE UP (ref 24.5–35.6)
BUN SERPL-MCNC: 15 MG/DL — SIGNIFICANT CHANGE UP (ref 7–23)
CALCIUM SERPL-MCNC: 8.5 MG/DL — SIGNIFICANT CHANGE UP (ref 8.5–10.1)
CHLORIDE SERPL-SCNC: 115 MMOL/L — HIGH (ref 96–108)
CO2 SERPL-SCNC: 21 MMOL/L — LOW (ref 22–31)
CREAT SERPL-MCNC: 0.78 MG/DL — SIGNIFICANT CHANGE UP (ref 0.5–1.3)
EGFR: 73 ML/MIN/1.73M2 — SIGNIFICANT CHANGE UP
GLUCOSE SERPL-MCNC: 83 MG/DL — SIGNIFICANT CHANGE UP (ref 70–99)
HCT VFR BLD CALC: 36.2 % — SIGNIFICANT CHANGE UP (ref 34.5–45)
HGB BLD-MCNC: 11.1 G/DL — LOW (ref 11.5–15.5)
INR BLD: 1.99 RATIO — HIGH (ref 0.85–1.18)
MCHC RBC-ENTMCNC: 30.4 PG — SIGNIFICANT CHANGE UP (ref 27–34)
MCHC RBC-ENTMCNC: 30.7 GM/DL — LOW (ref 32–36)
MCV RBC AUTO: 99.2 FL — SIGNIFICANT CHANGE UP (ref 80–100)
PLATELET # BLD AUTO: 357 K/UL — SIGNIFICANT CHANGE UP (ref 150–400)
POTASSIUM SERPL-MCNC: 4 MMOL/L — SIGNIFICANT CHANGE UP (ref 3.5–5.3)
POTASSIUM SERPL-SCNC: 4 MMOL/L — SIGNIFICANT CHANGE UP (ref 3.5–5.3)
PROTHROM AB SERPL-ACNC: 22 SEC — HIGH (ref 9.5–13)
RBC # BLD: 3.65 M/UL — LOW (ref 3.8–5.2)
RBC # FLD: 21.2 % — HIGH (ref 10.3–14.5)
SODIUM SERPL-SCNC: 142 MMOL/L — SIGNIFICANT CHANGE UP (ref 135–145)
WBC # BLD: 11.88 K/UL — HIGH (ref 3.8–10.5)
WBC # FLD AUTO: 11.88 K/UL — HIGH (ref 3.8–10.5)

## 2024-05-03 PROCEDURE — 99239 HOSP IP/OBS DSCHRG MGMT >30: CPT

## 2024-05-03 RX ORDER — CEFUROXIME AXETIL 250 MG
1 TABLET ORAL
Qty: 20 | Refills: 0
Start: 2024-05-03

## 2024-05-03 RX ORDER — METRONIDAZOLE 500 MG
1 TABLET ORAL
Qty: 20 | Refills: 0
Start: 2024-05-03

## 2024-05-03 RX ADMIN — Medication 50 MILLIGRAM(S): at 12:02

## 2024-05-03 RX ADMIN — CEFTRIAXONE 2000 MILLIGRAM(S): 500 INJECTION, POWDER, FOR SOLUTION INTRAMUSCULAR; INTRAVENOUS at 12:01

## 2024-05-03 RX ADMIN — Medication 100 MILLIGRAM(S): at 12:57

## 2024-05-03 RX ADMIN — LOSARTAN POTASSIUM 25 MILLIGRAM(S): 100 TABLET, FILM COATED ORAL at 12:02

## 2024-05-03 RX ADMIN — SODIUM CHLORIDE 75 MILLILITER(S): 9 INJECTION, SOLUTION INTRAVENOUS at 06:12

## 2024-05-03 NOTE — DISCHARGE NOTE PROVIDER - CARE PROVIDERS DIRECT ADDRESSES
[email or web address]

## 2024-05-03 NOTE — DISCHARGE NOTE PROVIDER - CARE PROVIDER_API CALL
Ray Mathias  Internal Medicine  [address] [place] [address]  Phone: [phone]  Fax: [phone]  Follow Up Time: 1 week    Jesse Woodard  Hematology  [address], [address]  [place] [address]  Phone: [phone]  Fax: [phone]  Follow Up Time: 1 week    Mainor Carbajal  Gastroenterology  [address] [place] [address]  Phone: [phone]  Fax: [phone]  Follow Up Time: 1 week    Elvis De León  Cardiovascular Disease  [address] [place] [address]  Phone: [phone]  Fax: [phone]  Follow Up Time: 1 week

## 2024-05-03 NOTE — DISCHARGE NOTE PROVIDER - HOSPITAL COURSE
89 yo female with Hx. of Atrial fib, on warfarin, CHF,  DJD DERIAN, s/p PPM, chronic low back pain, thrombocytosis   was BIBA form home with c/o abdominal pain , radiating into her low back. The patient symptoms started yesterday and got worse, she also has nausea but no vomiting. The patient daughter is present and states the patient had no appetite. 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 Patient seen by GI Surgery and IR. HIDA scan with no evidence of acute cholecystitis or biliary obstruction. No intervention warranted. ID consulted, patient started on IV cefepime and then transitioned to ceftriaxone 2 gm IV qd and metronidazole 500 mg IV q12h will transition to ceftin 500 mg PO q12h and metronidazole 500 mg PO q12h for 10 more days. patient with Chronic Afib on Coumadin, Suprathereputic INR. Given a dose of vitamin K. Resume  home  2mg coumadin and follow up with INR check early next week. Continue metoprolol. PPM interrogated: Normal functioning dual chamber PPM with battery longevity 2 years. resume home lasix for HFpEF. Continue to hold hydroxyurea as platelets have been stable. Follow up with Heme Onc outpatient.  Ulcerative Gingivitis with possible osteonecrosis, patient to follow up with wound care center for possible hyperbaric oxygen therapy. Patient to follow up with PCP GI Cardiology in 1-2 weeks. Advised to return to ED with any worsening symptoms chest pain shortness of breath fevers chills nausea vomiting diarrhea.

## 2024-05-03 NOTE — DISCHARGE NOTE PROVIDER - NSDCMRMEDTOKEN_GEN_ALL_CORE_FT
19-Sep-2023 13:21 allopurinol 100 mg oral tablet: 1 tab(s) orally 2 times a day  ammonium lactate 12% topical lotion: Apply topically to affected area once a day  chlorhexidine 0.12% mucous membrane liquid: 10 milliliter(s) orally 2 times a day *** STOPPED TAKING 3 DAYS AGO***  folic acid 1 mg oral tablet: 1 tab(s) orally once a day  furosemide 20 mg oral tablet: 1 tab(s) orally once a day  losartan 25 mg oral tablet: 1 tab(s) orally once a day  methotrexate 2.5 mg oral tablet: 4 tab(s) orally once a week *** STOPPED TAKING MED A WEEK AGO***  Metoprolol Succinate ER 50 mg oral tablet, extended release: 1 tab(s) orally once a day  montelukast 10 mg oral tablet: 1 tab(s) orally once a day  Multiple Vitamins oral tablet: 1 tab(s) orally once a day  potassium chloride 10 mEq oral tablet, extended release: 1 tab(s) orally once a day  pravastatin 20 mg oral tablet: 1 tab(s) orally once a day (in the evening)  Tylenol 500 mg oral tablet: 2 tab(s) orally every 8 hours as needed for  warfarin 2 mg oral tablet: 1 tab(s) orally once a day  ZyrTEC 10 mg oral tablet: 1 tab(s) orally once a day   allopurinol 100 mg oral tablet: 1 tab(s) orally 2 times a day  ammonium lactate 12% topical lotion: Apply topically to affected area once a day  cefuroxime 500 mg oral tablet: 1 tab(s) orally 2 times a day  chlorhexidine 0.12% mucous membrane liquid: 10 milliliter(s) orally 2 times a day *** STOPPED TAKING 3 DAYS AGO***  folic acid 1 mg oral tablet: 1 tab(s) orally once a day  furosemide 20 mg oral tablet: 1 tab(s) orally once a day  losartan 25 mg oral tablet: 1 tab(s) orally once a day  methotrexate 2.5 mg oral tablet: 4 tab(s) orally once a week *** STOPPED TAKING MED A WEEK AGO***  Metoprolol Succinate ER 50 mg oral tablet, extended release: 1 tab(s) orally once a day  metroNIDAZOLE 500 mg oral tablet: 1 tab(s) orally 2 times a day  montelukast 10 mg oral tablet: 1 tab(s) orally once a day  Multiple Vitamins oral tablet: 1 tab(s) orally once a day  potassium chloride 10 mEq oral tablet, extended release: 1 tab(s) orally once a day  pravastatin 20 mg oral tablet: 1 tab(s) orally once a day (in the evening)  Tylenol 500 mg oral tablet: 2 tab(s) orally every 8 hours as needed for  warfarin 2 mg oral tablet: 1 tab(s) orally once a day  ZyrTEC 10 mg oral tablet: 1 tab(s) orally once a day

## 2024-05-03 NOTE — DISCHARGE NOTE PROVIDER - ATTENDING DISCHARGE PHYSICAL EXAMINATION:
Gen: No acute distress   HEENT:  pupils equal and reactive, EOMI,   NECK:   supple, no carotid bruits, No JVD  CV:  +S1, +S2, regular rate rhythm, no murmurs or rubs  RESP:   lungs clear to auscultation bilaterally, no wheezing, rales, rhonchi, good air entry bilaterally  GI:  abdomen soft, non-tender, non-distended, normal BS, no bruits, no abdominal masses, no palpable masses  MSK:   normal muscle tone, no atrophy, no rigidity, no contractions  EXT:  no clubbing, no cyanosis, no edema, no calf pain, swelling or erythema  VASCULAR:  pulses equal and symmetric in the upper and lower extremities  NEURO:  AAOX3, no focal neurological deficits, follows all commands, able to move extremities spontaneously  SKIN:  no ulcers, lesions or rashes

## 2024-05-03 NOTE — DISCHARGE NOTE PROVIDER - DETAILS OF MALNUTRITION DIAGNOSIS/DIAGNOSES
This patient has been assessed with a concern for Malnutrition and was treated during this hospitalization for the following Nutrition diagnosis/diagnoses:     -  04/30/2024: Severe protein-calorie malnutrition   -  04/30/2024: Underweight (BMI < 19)

## 2024-05-03 NOTE — DISCHARGE NOTE PROVIDER - NSDCCPCAREPLAN_GEN_ALL_CORE_FT
PRINCIPAL DISCHARGE DIAGNOSIS  Diagnosis: Acalculous cholecystitis  Assessment and Plan of Treatment: No surgical intervention, Follow up with GI in one week. Complete course of oral antibitoics      SECONDARY DISCHARGE DIAGNOSES  Diagnosis: Supratherapeutic international normalized ratio (INR)  Assessment and Plan of Treatment: resume home coumadin, INR check in 3-4 days    Diagnosis: On warfarin for atrial fibrillation  Assessment and Plan of Treatment:

## 2024-05-03 NOTE — DISCHARGE NOTE PROVIDER - NSDCCAREPROVSEEN_GEN_ALL_CORE_FT
Seema, Kadeem Weathers, Richy Back, Suzanne Solis, Elliott Gonzalez, Jessica Daugherty, Gisselle Dasilva, Kaushik Woodard, Jesse Rivas, Mayank Beaulieu, Elder CALEB

## 2024-05-08 ENCOUNTER — OUTPATIENT (OUTPATIENT)
Dept: OUTPATIENT SERVICES | Facility: HOSPITAL | Age: 89
LOS: 1 days | End: 2024-05-08
Payer: MEDICARE

## 2024-05-08 DIAGNOSIS — L89.156 PRESSURE-INDUCED DEEP TISSUE DAMAGE OF SACRAL REGION: ICD-10-CM

## 2024-05-08 DIAGNOSIS — Z95.0 PRESENCE OF CARDIAC PACEMAKER: Chronic | ICD-10-CM

## 2024-05-08 PROCEDURE — 99212 OFFICE O/P EST SF 10 MIN: CPT

## 2024-05-15 ENCOUNTER — OUTPATIENT (OUTPATIENT)
Dept: OUTPATIENT SERVICES | Facility: HOSPITAL | Age: 89
LOS: 1 days | End: 2024-05-15
Payer: MEDICARE

## 2024-05-15 DIAGNOSIS — L89.156 PRESSURE-INDUCED DEEP TISSUE DAMAGE OF SACRAL REGION: ICD-10-CM

## 2024-05-15 DIAGNOSIS — Z95.0 PRESENCE OF CARDIAC PACEMAKER: Chronic | ICD-10-CM

## 2024-05-15 PROCEDURE — 99212 OFFICE O/P EST SF 10 MIN: CPT

## 2024-05-16 DIAGNOSIS — S60.812A ABRASION OF LEFT WRIST, INITIAL ENCOUNTER: ICD-10-CM

## 2024-05-16 DIAGNOSIS — M86.18 OTHER ACUTE OSTEOMYELITIS, OTHER SITE: ICD-10-CM
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
## 2024-05-16 DIAGNOSIS — I49.9 CARDIAC ARRHYTHMIA, UNSPECIFIED: ICD-10-CM

## 2024-05-16 DIAGNOSIS — I25.10 ATHEROSCLEROTIC HEART DISEASE OF NATIVE CORONARY ARTERY WITHOUT ANGINA PECTORIS: ICD-10-CM

## 2024-05-16 DIAGNOSIS — I25.2 OLD MYOCARDIAL INFARCTION: ICD-10-CM

## 2024-05-23 DIAGNOSIS — I25.2 OLD MYOCARDIAL INFARCTION: ICD-10-CM

## 2024-05-23 DIAGNOSIS — M87.180 OSTEONECROSIS DUE TO DRUGS, JAW: ICD-10-CM
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
## 2024-05-23 DIAGNOSIS — S60.812A ABRASION OF LEFT WRIST, INITIAL ENCOUNTER: ICD-10-CM

## 2024-05-23 DIAGNOSIS — I25.10 ATHEROSCLEROTIC HEART DISEASE OF NATIVE CORONARY ARTERY WITHOUT ANGINA PECTORIS: ICD-10-CM
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
## 2024-05-23 DIAGNOSIS — I49.9 CARDIAC ARRHYTHMIA, UNSPECIFIED: ICD-10-CM

## 2024-05-24 NOTE — DISCHARGE NOTE PROVIDER - PROVIDER TOKENS
Walk in
PROVIDER:[TOKEN:[[id number]],FOLLOWUP:[1 week]],PROVIDER:[TOKEN:[[id number]],FOLLOWUP:[1 week]],PROVIDER:[TOKEN:[[id number]],FOLLOWUP:[1 week]],PROVIDER:[TOKEN:[[id number]],FOLLOWUP:[1 week]]

## 2024-05-31 ENCOUNTER — INPATIENT (INPATIENT)
Facility: HOSPITAL | Age: 89
LOS: 5 days | Discharge: HOME HEALTH | DRG: 176 | End: 2024-06-06
Attending: STUDENT IN AN ORGANIZED HEALTH CARE EDUCATION/TRAINING PROGRAM | Admitting: HOSPITALIST
Payer: MEDICARE

## 2024-05-31 ENCOUNTER — RESULT REVIEW (OUTPATIENT)
Age: 89
End: 2024-05-31

## 2024-05-31 VITALS
HEART RATE: 78 BPM | SYSTOLIC BLOOD PRESSURE: 148 MMHG | DIASTOLIC BLOOD PRESSURE: 77 MMHG | TEMPERATURE: 98 F | OXYGEN SATURATION: 97 % | HEIGHT: 63 IN | RESPIRATION RATE: 18 BRPM

## 2024-05-31 DIAGNOSIS — I26.99 OTHER PULMONARY EMBOLISM WITHOUT ACUTE COR PULMONALE: ICD-10-CM

## 2024-05-31 DIAGNOSIS — I49.5 SICK SINUS SYNDROME: ICD-10-CM

## 2024-05-31 DIAGNOSIS — I82.409 ACUTE EMBOLISM AND THROMBOSIS OF UNSPECIFIED DEEP VEINS OF UNSPECIFIED LOWER EXTREMITY: ICD-10-CM

## 2024-05-31 DIAGNOSIS — I48.20 CHRONIC ATRIAL FIBRILLATION, UNSPECIFIED: ICD-10-CM

## 2024-05-31 DIAGNOSIS — Z00.00 ENCOUNTER FOR GENERAL ADULT MEDICAL EXAMINATION WITHOUT ABNORMAL FINDINGS: ICD-10-CM

## 2024-05-31 DIAGNOSIS — D47.3 ESSENTIAL (HEMORRHAGIC) THROMBOCYTHEMIA: ICD-10-CM

## 2024-05-31 DIAGNOSIS — I50.32 CHRONIC DIASTOLIC (CONGESTIVE) HEART FAILURE: ICD-10-CM

## 2024-05-31 DIAGNOSIS — Z95.0 PRESENCE OF CARDIAC PACEMAKER: Chronic | ICD-10-CM

## 2024-05-31 LAB
ADD ON TEST-SPECIMEN IN LAB: SIGNIFICANT CHANGE UP
ADD ON TEST-SPECIMEN IN LAB: SIGNIFICANT CHANGE UP
ALBUMIN SERPL ELPH-MCNC: 2.8 G/DL — LOW (ref 3.3–5)
ALP SERPL-CCNC: 249 U/L — HIGH (ref 40–120)
ALT FLD-CCNC: 40 U/L — SIGNIFICANT CHANGE UP (ref 12–78)
ANION GAP SERPL CALC-SCNC: 4 MMOL/L — LOW (ref 5–17)
APTT BLD: 156.3 SEC — CRITICAL HIGH (ref 24.5–35.6)
APTT BLD: 62.2 SEC — HIGH (ref 24.5–35.6)
AST SERPL-CCNC: 42 U/L — HIGH (ref 15–37)
BASOPHILS # BLD AUTO: 0.12 K/UL — SIGNIFICANT CHANGE UP (ref 0–0.2)
BASOPHILS NFR BLD AUTO: 1.1 % — SIGNIFICANT CHANGE UP (ref 0–2)
BILIRUB SERPL-MCNC: 0.5 MG/DL — SIGNIFICANT CHANGE UP (ref 0.2–1.2)
BLD GP AB SCN SERPL QL: SIGNIFICANT CHANGE UP
BUN SERPL-MCNC: 34 MG/DL — HIGH (ref 7–23)
CALCIUM SERPL-MCNC: 9.4 MG/DL — SIGNIFICANT CHANGE UP (ref 8.5–10.1)
CHLORIDE SERPL-SCNC: 107 MMOL/L — SIGNIFICANT CHANGE UP (ref 96–108)
CO2 SERPL-SCNC: 26 MMOL/L — SIGNIFICANT CHANGE UP (ref 22–31)
CREAT SERPL-MCNC: 1.06 MG/DL — SIGNIFICANT CHANGE UP (ref 0.5–1.3)
EGFR: 50 ML/MIN/1.73M2 — LOW
EOSINOPHIL # BLD AUTO: 1.54 K/UL — HIGH (ref 0–0.5)
EOSINOPHIL NFR BLD AUTO: 13.5 % — HIGH (ref 0–6)
GLUCOSE SERPL-MCNC: 95 MG/DL — SIGNIFICANT CHANGE UP (ref 70–99)
HCT VFR BLD CALC: 38.7 % — SIGNIFICANT CHANGE UP (ref 34.5–45)
HGB BLD-MCNC: 11.9 G/DL — SIGNIFICANT CHANGE UP (ref 11.5–15.5)
IMM GRANULOCYTES NFR BLD AUTO: 1 % — HIGH (ref 0–0.9)
INR BLD: 3.93 RATIO — HIGH (ref 0.85–1.18)
INR BLD: 4.57 RATIO — HIGH (ref 0.85–1.18)
LYMPHOCYTES # BLD AUTO: 1.5 K/UL — SIGNIFICANT CHANGE UP (ref 1–3.3)
LYMPHOCYTES # BLD AUTO: 13.2 % — SIGNIFICANT CHANGE UP (ref 13–44)
MCHC RBC-ENTMCNC: 30.1 PG — SIGNIFICANT CHANGE UP (ref 27–34)
MCHC RBC-ENTMCNC: 30.7 GM/DL — LOW (ref 32–36)
MCV RBC AUTO: 97.7 FL — SIGNIFICANT CHANGE UP (ref 80–100)
MONOCYTES # BLD AUTO: 0.94 K/UL — HIGH (ref 0–0.9)
MONOCYTES NFR BLD AUTO: 8.3 % — SIGNIFICANT CHANGE UP (ref 2–14)
NEUTROPHILS # BLD AUTO: 7.16 K/UL — SIGNIFICANT CHANGE UP (ref 1.8–7.4)
NEUTROPHILS NFR BLD AUTO: 62.9 % — SIGNIFICANT CHANGE UP (ref 43–77)
NT-PROBNP SERPL-SCNC: 4357 PG/ML — HIGH (ref 0–450)
NT-PROBNP SERPL-SCNC: 5173 PG/ML — HIGH (ref 0–450)
PLATELET # BLD AUTO: 592 K/UL — HIGH (ref 150–400)
POTASSIUM SERPL-MCNC: 4.6 MMOL/L — SIGNIFICANT CHANGE UP (ref 3.5–5.3)
POTASSIUM SERPL-SCNC: 4.6 MMOL/L — SIGNIFICANT CHANGE UP (ref 3.5–5.3)
PROT SERPL-MCNC: 7.3 GM/DL — SIGNIFICANT CHANGE UP (ref 6–8.3)
PROTHROM AB SERPL-ACNC: 42.7 SEC — HIGH (ref 9.5–13)
PROTHROM AB SERPL-ACNC: 49.4 SEC — HIGH (ref 9.5–13)
RBC # BLD: 3.96 M/UL — SIGNIFICANT CHANGE UP (ref 3.8–5.2)
RBC # FLD: 20 % — HIGH (ref 10.3–14.5)
SODIUM SERPL-SCNC: 137 MMOL/L — SIGNIFICANT CHANGE UP (ref 135–145)
TROPONIN I, HIGH SENSITIVITY RESULT: 5.71 NG/L — SIGNIFICANT CHANGE UP
WBC # BLD: 11.37 K/UL — HIGH (ref 3.8–10.5)
WBC # FLD AUTO: 11.37 K/UL — HIGH (ref 3.8–10.5)

## 2024-05-31 PROCEDURE — 71045 X-RAY EXAM CHEST 1 VIEW: CPT

## 2024-05-31 PROCEDURE — 82728 ASSAY OF FERRITIN: CPT

## 2024-05-31 PROCEDURE — 99285 EMERGENCY DEPT VISIT HI MDM: CPT

## 2024-05-31 PROCEDURE — 87086 URINE CULTURE/COLONY COUNT: CPT

## 2024-05-31 PROCEDURE — 97530 THERAPEUTIC ACTIVITIES: CPT | Mod: GP

## 2024-05-31 PROCEDURE — 80202 ASSAY OF VANCOMYCIN: CPT

## 2024-05-31 PROCEDURE — 93010 ELECTROCARDIOGRAM REPORT: CPT

## 2024-05-31 PROCEDURE — 99222 1ST HOSP IP/OBS MODERATE 55: CPT

## 2024-05-31 PROCEDURE — 36415 COLL VENOUS BLD VENIPUNCTURE: CPT

## 2024-05-31 PROCEDURE — 93306 TTE W/DOPPLER COMPLETE: CPT | Mod: 26

## 2024-05-31 PROCEDURE — 86900 BLOOD TYPING SEROLOGIC ABO: CPT

## 2024-05-31 PROCEDURE — 85027 COMPLETE CBC AUTOMATED: CPT

## 2024-05-31 PROCEDURE — 82746 ASSAY OF FOLIC ACID SERUM: CPT

## 2024-05-31 PROCEDURE — 80048 BASIC METABOLIC PNL TOTAL CA: CPT

## 2024-05-31 PROCEDURE — 85652 RBC SED RATE AUTOMATED: CPT

## 2024-05-31 PROCEDURE — 84100 ASSAY OF PHOSPHORUS: CPT

## 2024-05-31 PROCEDURE — 86850 RBC ANTIBODY SCREEN: CPT

## 2024-05-31 PROCEDURE — 85610 PROTHROMBIN TIME: CPT

## 2024-05-31 PROCEDURE — 86901 BLOOD TYPING SEROLOGIC RH(D): CPT

## 2024-05-31 PROCEDURE — 71045 X-RAY EXAM CHEST 1 VIEW: CPT | Mod: 26

## 2024-05-31 PROCEDURE — 82607 VITAMIN B-12: CPT

## 2024-05-31 PROCEDURE — 85730 THROMBOPLASTIN TIME PARTIAL: CPT

## 2024-05-31 PROCEDURE — 97116 GAIT TRAINING THERAPY: CPT | Mod: GP

## 2024-05-31 PROCEDURE — 83735 ASSAY OF MAGNESIUM: CPT

## 2024-05-31 PROCEDURE — 83880 ASSAY OF NATRIURETIC PEPTIDE: CPT

## 2024-05-31 PROCEDURE — 80053 COMPREHEN METABOLIC PANEL: CPT

## 2024-05-31 PROCEDURE — 83550 IRON BINDING TEST: CPT

## 2024-05-31 PROCEDURE — 83540 ASSAY OF IRON: CPT

## 2024-05-31 PROCEDURE — 97163 PT EVAL HIGH COMPLEX 45 MIN: CPT | Mod: GP

## 2024-05-31 PROCEDURE — 81001 URINALYSIS AUTO W/SCOPE: CPT

## 2024-05-31 RX ORDER — METOPROLOL TARTRATE 50 MG
1 TABLET ORAL
Qty: 0 | Refills: 0 | DISCHARGE

## 2024-05-31 RX ORDER — METHOTREXATE 2.5 MG/1
4 TABLET ORAL
Refills: 0 | DISCHARGE

## 2024-05-31 RX ORDER — POTASSIUM CHLORIDE 20 MEQ
1 PACKET (EA) ORAL
Refills: 0 | DISCHARGE

## 2024-05-31 RX ORDER — ALLOPURINOL 300 MG
1 TABLET ORAL
Qty: 0 | Refills: 0 | DISCHARGE

## 2024-05-31 RX ORDER — HEPARIN SODIUM 5000 [USP'U]/ML
4000 INJECTION INTRAVENOUS; SUBCUTANEOUS EVERY 6 HOURS
Refills: 0 | Status: DISCONTINUED | OUTPATIENT
Start: 2024-05-31 | End: 2024-06-04

## 2024-05-31 RX ORDER — MONTELUKAST 4 MG/1
10 TABLET, CHEWABLE ORAL AT BEDTIME
Refills: 0 | Status: DISCONTINUED | OUTPATIENT
Start: 2024-05-31 | End: 2024-06-06

## 2024-05-31 RX ORDER — HEPARIN SODIUM 5000 [USP'U]/ML
4000 INJECTION INTRAVENOUS; SUBCUTANEOUS ONCE
Refills: 0 | Status: COMPLETED | OUTPATIENT
Start: 2024-05-31 | End: 2024-05-31

## 2024-05-31 RX ORDER — FUROSEMIDE 40 MG
1 TABLET ORAL
Refills: 0 | DISCHARGE

## 2024-05-31 RX ORDER — LOSARTAN POTASSIUM 100 MG/1
1 TABLET, FILM COATED ORAL
Qty: 0 | Refills: 0 | DISCHARGE

## 2024-05-31 RX ORDER — CHLORHEXIDINE GLUCONATE 213 G/1000ML
10 SOLUTION TOPICAL
Refills: 0 | DISCHARGE

## 2024-05-31 RX ORDER — FOLIC ACID 0.8 MG
1 TABLET ORAL DAILY
Refills: 0 | Status: DISCONTINUED | OUTPATIENT
Start: 2024-05-31 | End: 2024-06-06

## 2024-05-31 RX ORDER — ALLOPURINOL 300 MG
100 TABLET ORAL
Refills: 0 | Status: DISCONTINUED | OUTPATIENT
Start: 2024-05-31 | End: 2024-06-06

## 2024-05-31 RX ORDER — HEPARIN SODIUM 5000 [USP'U]/ML
INJECTION INTRAVENOUS; SUBCUTANEOUS
Qty: 25000 | Refills: 0 | Status: DISCONTINUED | OUTPATIENT
Start: 2024-05-31 | End: 2024-06-04

## 2024-05-31 RX ORDER — ATORVASTATIN CALCIUM 80 MG/1
10 TABLET, FILM COATED ORAL AT BEDTIME
Refills: 0 | Status: DISCONTINUED | OUTPATIENT
Start: 2024-05-31 | End: 2024-06-06

## 2024-05-31 RX ORDER — WARFARIN SODIUM 2.5 MG/1
1 TABLET ORAL
Refills: 0 | DISCHARGE

## 2024-05-31 RX ORDER — HEPARIN SODIUM 5000 [USP'U]/ML
2000 INJECTION INTRAVENOUS; SUBCUTANEOUS EVERY 6 HOURS
Refills: 0 | Status: DISCONTINUED | OUTPATIENT
Start: 2024-05-31 | End: 2024-06-04

## 2024-05-31 RX ORDER — FUROSEMIDE 40 MG
20 TABLET ORAL DAILY
Refills: 0 | Status: DISCONTINUED | OUTPATIENT
Start: 2024-05-31 | End: 2024-06-06

## 2024-05-31 RX ORDER — POTASSIUM CHLORIDE 20 MEQ
10 PACKET (EA) ORAL DAILY
Refills: 0 | Status: DISCONTINUED | OUTPATIENT
Start: 2024-05-31 | End: 2024-06-06

## 2024-05-31 RX ORDER — SODIUM CHLORIDE 9 MG/ML
3 INJECTION INTRAMUSCULAR; INTRAVENOUS; SUBCUTANEOUS EVERY 8 HOURS
Refills: 0 | Status: DISCONTINUED | OUTPATIENT
Start: 2024-05-31 | End: 2024-06-06

## 2024-05-31 RX ORDER — CETIRIZINE HYDROCHLORIDE 10 MG/1
1 TABLET ORAL
Refills: 0 | DISCHARGE

## 2024-05-31 RX ORDER — ENOXAPARIN SODIUM 100 MG/ML
52 INJECTION SUBCUTANEOUS EVERY 12 HOURS
Refills: 0 | Status: DISCONTINUED | OUTPATIENT
Start: 2024-05-31 | End: 2024-05-31

## 2024-05-31 RX ORDER — FOLIC ACID 0.8 MG
1 TABLET ORAL
Qty: 0 | Refills: 0 | DISCHARGE

## 2024-05-31 RX ORDER — ACETAMINOPHEN 500 MG
2 TABLET ORAL
Refills: 0 | DISCHARGE

## 2024-05-31 RX ORDER — MONTELUKAST 4 MG/1
1 TABLET, CHEWABLE ORAL
Qty: 0 | Refills: 0 | DISCHARGE

## 2024-05-31 RX ORDER — SOD,AMMONIUM,POTASSIUM LACTATE
1 CREAM (GRAM) TOPICAL
Refills: 0 | DISCHARGE

## 2024-05-31 RX ORDER — METOPROLOL TARTRATE 50 MG
50 TABLET ORAL DAILY
Refills: 0 | Status: DISCONTINUED | OUTPATIENT
Start: 2024-05-31 | End: 2024-06-06

## 2024-05-31 RX ADMIN — ATORVASTATIN CALCIUM 10 MILLIGRAM(S): 80 TABLET, FILM COATED ORAL at 22:25

## 2024-05-31 RX ADMIN — Medication 100 MILLIGRAM(S): at 22:25

## 2024-05-31 RX ADMIN — SODIUM CHLORIDE 3 MILLILITER(S): 9 INJECTION INTRAMUSCULAR; INTRAVENOUS; SUBCUTANEOUS at 22:00

## 2024-05-31 RX ADMIN — MONTELUKAST 10 MILLIGRAM(S): 4 TABLET, CHEWABLE ORAL at 22:25

## 2024-05-31 RX ADMIN — HEPARIN SODIUM 4000 UNIT(S): 5000 INJECTION INTRAVENOUS; SUBCUTANEOUS at 16:28

## 2024-05-31 RX ADMIN — HEPARIN SODIUM 1000 UNIT(S)/HR: 5000 INJECTION INTRAVENOUS; SUBCUTANEOUS at 16:29

## 2024-05-31 NOTE — ED PROVIDER NOTE - CLINICAL SUMMARY MEDICAL DECISION MAKING FREE TEXT BOX
89 yo female with DVT, PE, discussed with cardiology group [facility], they would like pt placed on full dose AC and admitted for further work up and treatment

## 2024-05-31 NOTE — H&P ADULT - PROBLEM SELECTOR PLAN 3
Resume BB (Toprol). Elevated [id number] on DOAC (Wafarin recently switched to Xarelto). Will anticoagulate as above. Resume BB (Toprol). Elevated [id number] on DOAC (Wafarin recently switched to Xarelto). Will anticoagulate once INR normalizes as above.

## 2024-05-31 NOTE — H&P ADULT - HISTORY OF PRESENT ILLNESS
89-year-old female with PMH of HFpEF, CAD, Afib (on Warfarin and recently switched to DOAC), SSS s/p PPM, DLD, HTN presents to the ED with a diagnosis of PE. Patient was seen at her cardiologist's office for LE swelling found to have B/L DVT while on Warfarin, so was started on Xarelto and a CTPE was ordered. After finding of L main PE with concerns for RV strain patient's cardiologist advised her to come to the ED. On arrival patient denied acute complaints.

## 2024-05-31 NOTE — ED ADULT NURSE NOTE - OBJECTIVE STATEMENT
pt in ed sent in by MD for PE. piv inserted. blood work sent. ekg completed. placed on cardiac monitor.

## 2024-05-31 NOTE — ED ADULT NURSE REASSESSMENT NOTE - NS ED NURSE REASSESS COMMENT FT1
Patient seen and assessed in ED. Patient resting in stretcher, NAD, A+Ox4, family at bedside. VSS as per flowsheet. Heparin gtt began as per MAR. All questions answered. Patient profile completed. Pt is clean. Denies pain. Awaiting bed assignment and further orders regarding care. Patient seen and assessed in ED. Patient resting in stretcher, NAD, A+Ox4, family at bedside. VSS as per flowsheet. Heparin gtt began as per MAR. All questions answered. Patient profile completed. Pt is clean. Denies pain. Awaiting bed assignment and further orders regarding care.    addendum 1642: stage 2 wound visualized L buttock, stage 2 also present on coxyx. Pink foam placed over. Purewick applied. Patient seen and assessed in ED. Patient resting in stretcher, NAD, A+Ox4, family at bedside. VSS as per flowsheet. Heparin gtt began as per MAR. All questions answered. Patient profile completed. Pt is clean. Denies pain. Awaiting bed assignment and further orders regarding care.    addendum 1644: stage 2 wound visualized L buttock, stage 2 also present on coxyx. Pink foam placed over. Purewick applied. L wrist skin tear wound from previous admission also redressed. All wounds clean and dry.

## 2024-05-31 NOTE — ED PROVIDER NOTE - PHYSICAL EXAMINATION
Constitutional: NAD   Eyes: PERRLA  Head: Normocephalic   Mouth: MMM  Cardiac: regular rate, mild LE edema  Resp: Lungs CTAB  GI: Abd s/nt/nd, no rebound or guarding.  Neuro: awake, alert, moving all extremities  Skin: No rashes

## 2024-05-31 NOTE — H&P ADULT - PROBLEM SELECTOR PLAN 1
B/L DVT and L main PE diagnosed at outside facility. Echo here with evidence of RV strain. Trop negative, BNP 4300. Intermediate risk PE. INR is 4.5 on Warfarin (held now), and will wait for it to normalize to start Heparin ggt. Cardiology consult (Dr. De León / Dr. Daugherty). B/L DVT and L main PE diagnosed at outside facility. Echo here with evidence of RV strain. Trop negative, BNP 4300. Intermediate risk PE. INR is 4.5 (previously on Warfarin and recently switched to Xarelto), and will wait for it to normalize to start Heparin ggt. Cardiology consult (Dr. De León / Dr. Daugherty).

## 2024-05-31 NOTE — ED ADULT NURSE NOTE - NSFALLHARMRISKINTERV_ED_ALL_ED
Assistance OOB with selected safe patient handling equipment if applicable/Communicate risk of Fall with Harm to all staff, patient, and family/Provide visual cue: red socks, yellow wristband, yellow gown, etc/Reinforce activity limits and safety measures with patient and family/Bed in lowest position, wheels locked, appropriate side rails in place/Call bell, personal items and telephone in reach/Instruct patient to call for assistance before getting out of bed/chair/stretcher/Non-slip footwear applied when patient is off stretcher/[place] to call system/Physically safe environment - no spills, clutter or unnecessary equipment/Purposeful Proactive Rounding/Room/bathroom lighting operational, light cord in reach

## 2024-05-31 NOTE — ED PROVIDER NOTE - OBJECTIVE STATEMENT
88 yo female w/PMHx CHF, CAD, SSS, pacemaker, HLD, Afib, HTN presents to the ED with LE swelling. Pt yesterday went to see her PCP due to swelling, was dx with b/l DVT, was started on DOACs, today pt had cta done out pt and found to have PE with some mild dilation of R ventricle, so pt was advised to come to the ED. Denying any complaints

## 2024-05-31 NOTE — ED ADULT NURSE NOTE - CHIEF COMPLAINT QUOTE
Pt BIBEMS from PCP for confirmed OP CT Angio result for PE. Pt asymptomatic at this time. Denies chest pain, sob, fevers or chills. PMhx of DVT, HTN, afib and HLD. Pt taken directly to room for EKG.

## 2024-05-31 NOTE — ED ADULT TRIAGE NOTE - CHIEF COMPLAINT QUOTE
Pt BIBEMS from PCP for confirmed op result for PE. Pt asymptomatic at this time. Denies chest pain, sob, fevers or chills. Pt BIBEMS from PCP for confirmed OP CT Angio result for PE. Pt asymptomatic at this time. Denies chest pain, sob, fevers or chills. PMhx of DVT, HTN, afib and HLD. Pt taken directly to room for EKG.

## 2024-05-31 NOTE — H&P ADULT - PROBLEM SELECTOR PLAN 4
BNP elevated in the setting of PE with RV strain. No signs of ADHF. On Lasix 20 mg daily at home, will resume.

## 2024-05-31 NOTE — H&P ADULT - NSHPLABSRESULTS_GEN_ALL_CORE
LABS:                       11.9   11.37 )-----------( 592      ( 31 May 2024 14:14 )             38.7     05-31    137  |  107  |  34<H>  ----------------------------<  95  4.6   |  26  |  1.06    Ca    9.4      31 May 2024 14:14    TPro  7.3  /  Alb  2.8<L>  /  TBili  0.5  /  DBili  x   /  AST  42<H>  /  ALT  40  /  AlkPhos  249<H>  05-31    PT/INR - ( 31 May 2024 15:42 )   PT: 49.4 sec;   INR: 4.57 ratio         PTT - ( 31 May 2024 15:42 )  PTT:62.2 sec    TTE (5/31/24):  1. Left ventricular cavity is normal in size. Left ventricular systolic function is low normal with an ejection fraction of 52 % by Aguirre's method of disks. 2. Apical septal segment is abnormal. 3. The left atrium is severely dilated. 4. The right atrium is severely dilated. 5. Moderate mitral regurgitation. 6. Moderate to severe tricuspid regurgitation. 7. Mild aortic regurgitation. 8. Mild aortic stenosis. 9. No left ventricular hypertrophy. 10. Severe pulmonary hypertension. Critical Findings: Newly diagnosed Severe Pulmonary Hypertension (RVSP >65mmHg) and RV strain (Possible Pulmonary Embolus). Dr Wright was informed of the findings by Estela Morgan in person on 5/31/2024 at 3:20:00 PM.

## 2024-05-31 NOTE — H&P ADULT - ASSESSMENT
89-year-old female with PMH of HFpEF, CAD, Afib (on Warfarin and recently switched to DOAC), SSS s/p PPM, DLD, HTN presents to the ED admitted with B/L DVT while on Warfarin, and L main PE with RV strain.

## 2024-05-31 NOTE — PHARMACOTHERAPY INTERVENTION NOTE - COMMENTS
Medication history complete, reviewed medications with patient daughter and confirmed with doctor first med hx. Patient started Xarelto sample 5/30/24 daughter can't recall the mg at the moment.

## 2024-05-31 NOTE — H&P ADULT - NSHPPHYSICALEXAM_GEN_ALL_CORE
T(C): 36.2 (05-31-24 @ 18:00), Max: 36.4 (05-31-24 @ 13:48)  HR: 64 (05-31-24 @ 18:00) (64 - 78)  BP: 138/60 (05-31-24 @ 18:00) (138/60 - 148/77)  RR: 18 (05-31-24 @ 18:00) (18 - 18)  SpO2: 100% (05-31-24 @ 18:00) (97% - 100%)    CONSTITUTIONAL: Well groomed, no apparent distress  EYES: PERRLA and symmetric, EOMI, No conjunctival or scleral injection, non-icteric  NECK: Supple, symmetric and without tracheal deviation. No JVD.  RESP: No respiratory distress, no use of accessory muscles; CTA b/l, no WRR.  CV: RRR, +S1S2, no MRG; no JVD; no peripheral edema.  GI: Soft, NT, ND, no rebound, no guarding; no palpable masses; no hepatosplenomegaly; no hernia palpated  LYMPH: No cervical LAD or tenderness; no axillary LAD or tenderness; no inguinal LAD or tenderness  SKIN: No rashes or ulcers noted.  NEURO: A+O x 3, no focal deficits.

## 2024-06-01 LAB
ALBUMIN SERPL ELPH-MCNC: 2.4 G/DL — LOW (ref 3.3–5)
ALP SERPL-CCNC: 204 U/L — HIGH (ref 40–120)
ALT FLD-CCNC: 33 U/L — SIGNIFICANT CHANGE UP (ref 12–78)
ANION GAP SERPL CALC-SCNC: 5 MMOL/L — SIGNIFICANT CHANGE UP (ref 5–17)
APTT BLD: 39.9 SEC — HIGH (ref 24.5–35.6)
APTT BLD: 43.1 SEC — HIGH (ref 24.5–35.6)
AST SERPL-CCNC: 29 U/L — SIGNIFICANT CHANGE UP (ref 15–37)
BILIRUB SERPL-MCNC: 0.4 MG/DL — SIGNIFICANT CHANGE UP (ref 0.2–1.2)
BUN SERPL-MCNC: 36 MG/DL — HIGH (ref 7–23)
CALCIUM SERPL-MCNC: 9.1 MG/DL — SIGNIFICANT CHANGE UP (ref 8.5–10.1)
CHLORIDE SERPL-SCNC: 109 MMOL/L — HIGH (ref 96–108)
CO2 SERPL-SCNC: 26 MMOL/L — SIGNIFICANT CHANGE UP (ref 22–31)
CREAT SERPL-MCNC: 1.05 MG/DL — SIGNIFICANT CHANGE UP (ref 0.5–1.3)
EGFR: 51 ML/MIN/1.73M2 — LOW
GLUCOSE SERPL-MCNC: 82 MG/DL — SIGNIFICANT CHANGE UP (ref 70–99)
HCT VFR BLD CALC: 33.2 % — LOW (ref 34.5–45)
HGB BLD-MCNC: 10.1 G/DL — LOW (ref 11.5–15.5)
INR BLD: 2.35 RATIO — HIGH (ref 0.85–1.18)
INR BLD: 3.1 RATIO — HIGH (ref 0.85–1.18)
MAGNESIUM SERPL-MCNC: 2.8 MG/DL — HIGH (ref 1.6–2.6)
MCHC RBC-ENTMCNC: 29.4 PG — SIGNIFICANT CHANGE UP (ref 27–34)
MCHC RBC-ENTMCNC: 30.4 GM/DL — LOW (ref 32–36)
MCV RBC AUTO: 96.8 FL — SIGNIFICANT CHANGE UP (ref 80–100)
PHOSPHATE SERPL-MCNC: 3.2 MG/DL — SIGNIFICANT CHANGE UP (ref 2.5–4.5)
PLATELET # BLD AUTO: 543 K/UL — HIGH (ref 150–400)
POTASSIUM SERPL-MCNC: 4.4 MMOL/L — SIGNIFICANT CHANGE UP (ref 3.5–5.3)
POTASSIUM SERPL-SCNC: 4.4 MMOL/L — SIGNIFICANT CHANGE UP (ref 3.5–5.3)
PROT SERPL-MCNC: 5.8 GM/DL — LOW (ref 6–8.3)
PROTHROM AB SERPL-ACNC: 25.9 SEC — HIGH (ref 9.5–13)
PROTHROM AB SERPL-ACNC: 33.9 SEC — HIGH (ref 9.5–13)
RBC # BLD: 3.43 M/UL — LOW (ref 3.8–5.2)
RBC # FLD: 20.4 % — HIGH (ref 10.3–14.5)
SODIUM SERPL-SCNC: 140 MMOL/L — SIGNIFICANT CHANGE UP (ref 135–145)
WBC # BLD: 10.27 K/UL — SIGNIFICANT CHANGE UP (ref 3.8–10.5)
WBC # FLD AUTO: 10.27 K/UL — SIGNIFICANT CHANGE UP (ref 3.8–10.5)

## 2024-06-01 RX ADMIN — SODIUM CHLORIDE 3 MILLILITER(S): 9 INJECTION INTRAMUSCULAR; INTRAVENOUS; SUBCUTANEOUS at 21:27

## 2024-06-01 RX ADMIN — HEPARIN SODIUM 1100 UNIT(S)/HR: 5000 INJECTION INTRAVENOUS; SUBCUTANEOUS at 14:33

## 2024-06-01 RX ADMIN — HEPARIN SODIUM 1000 UNIT(S)/HR: 5000 INJECTION INTRAVENOUS; SUBCUTANEOUS at 07:26

## 2024-06-01 RX ADMIN — HEPARIN SODIUM 4000 UNIT(S): 5000 INJECTION INTRAVENOUS; SUBCUTANEOUS at 21:24

## 2024-06-01 RX ADMIN — Medication 1 MILLIGRAM(S): at 09:49

## 2024-06-01 RX ADMIN — MONTELUKAST 10 MILLIGRAM(S): 4 TABLET, CHEWABLE ORAL at 21:14

## 2024-06-01 RX ADMIN — Medication 50 MILLIGRAM(S): at 09:49

## 2024-06-01 RX ADMIN — ATORVASTATIN CALCIUM 10 MILLIGRAM(S): 80 TABLET, FILM COATED ORAL at 21:14

## 2024-06-01 RX ADMIN — Medication 100 MILLIGRAM(S): at 09:49

## 2024-06-01 RX ADMIN — HEPARIN SODIUM 2000 UNIT(S): 5000 INJECTION INTRAVENOUS; SUBCUTANEOUS at 14:40

## 2024-06-01 RX ADMIN — HEPARIN SODIUM 1300 UNIT(S)/HR: 5000 INJECTION INTRAVENOUS; SUBCUTANEOUS at 21:15

## 2024-06-01 RX ADMIN — Medication 1 TABLET(S): at 09:49

## 2024-06-01 RX ADMIN — HEPARIN SODIUM 1100 UNIT(S)/HR: 5000 INJECTION INTRAVENOUS; SUBCUTANEOUS at 19:42

## 2024-06-01 RX ADMIN — Medication 100 MILLIGRAM(S): at 21:14

## 2024-06-01 RX ADMIN — Medication 20 MILLIGRAM(S): at 09:49

## 2024-06-01 RX ADMIN — SODIUM CHLORIDE 3 MILLILITER(S): 9 INJECTION INTRAMUSCULAR; INTRAVENOUS; SUBCUTANEOUS at 12:03

## 2024-06-01 RX ADMIN — Medication 10 MILLIEQUIVALENT(S): at 09:49

## 2024-06-01 NOTE — DIETITIAN NUTRITION RISK NOTIFICATION - MALNUTRITION EVALUATION AS DEMONSTRATED BY (ADULTS > 20 YEARS OF AGE)
Inadequate energy intake.../Loss of subcutaneous fat.../Loss of muscle.../Fluid accumulation... calm

## 2024-06-01 NOTE — DIETITIAN INITIAL EVALUATION ADULT - PROBLEM SELECTOR PLAN 1
B/L DVT and L main PE diagnosed at outside facility. Echo here with evidence of RV strain. Trop negative, BNP 4300. Intermediate risk PE. INR is 4.5 (previously on Warfarin and recently switched to Xarelto), and will wait for it to normalize to start Heparin ggt. Cardiology consult (Dr. De León / Dr. Daugherty).

## 2024-06-01 NOTE — DIETITIAN INITIAL EVALUATION ADULT - PROBLEM SELECTOR PLAN 3
Resume BB (Toprol). Elevated [id number] on DOAC (Wafarin recently switched to Xarelto). Will anticoagulate once INR normalizes as above.

## 2024-06-01 NOTE — DIETITIAN INITIAL EVALUATION ADULT - OTHER INFO
89-year-old female with PMH of HFpEF, CAD, Afib (on Warfarin and recently switched to DOAC), SSS s/p PPM, DLD, HTN presents to the ED with a diagnosis of PE. Patient was seen at her cardiologist's office for LE swelling found to have B/L DVT while on Warfarin, so was started on Xarelto and a CTPE was ordered. After finding of L main PE with concerns for RV strain patient's cardiologist advised her to come to the ED. On arrival patient denied acute complaints.   Admitted for pulmonary embolism    Known to nutrition services with previous diagnosis of severe PCM 4/30/24.  Prescribed a DASH/TLC diet at .  Allowances noted for texture modified options as requested 2/2 PMH of osteonecrosis of the jaw.  Breakfast tray present upon RD arrival, 75% completed.  Unable to verbalize UBW.  Weight history reviewed: 4/28/24 RD bed scale 107.8#.  Based on bed scale wt obtained by RD on 6/1/24 pt wt is 113.1# (+5.3#/4.7%) - not clinically significant. Presence of 2+ BL LE edema could be masking losses, skewing appearance.   Patient appears thin and frail.  NFPE revealed severe muscle/fat wasting.  Recommend to c/w DASH/TLC diet as prescribed.  Add Ensure + HP shake TID to optimize nutritional needs (provides 350 kcal, 20g protein/ shake).    See recommendations below.

## 2024-06-01 NOTE — DIETITIAN INITIAL EVALUATION ADULT - ORAL INTAKE PTA/DIET HISTORY
Reports living with her daughter who assists with the shopping and cooking.  No specific details provide for diet recall however endorses consuming a small breakfast, no lunch and a large dinner.  Pt reports her daughter ensures she has a healthy diet and provides texture modified foods prn 2/2 dentition concerns. Pt reports 2 ONS/day but unable to provide name of supplement.  Likely meeting 50-75% ENN.

## 2024-06-01 NOTE — DIETITIAN INITIAL EVALUATION ADULT - PERTINENT LABORATORY DATA
06-01    140  |  109<H>  |  36<H>  ----------------------------<  82  4.4   |  26  |  1.05    Ca    9.1      01 Jun 2024 07:19  Phos  3.2     06-01  Mg     2.8     06-01    TPro  5.8<L>  /  Alb  2.4<L>  /  TBili  0.4  /  DBili  x   /  AST  29  /  ALT  33  /  AlkPhos  204<H>  06-01

## 2024-06-01 NOTE — PROGRESS NOTE ADULT - SUBJECTIVE AND OBJECTIVE BOX
HPI:  89-year-old female with PMH of HFpEF, CAD, Afib (on Warfarin and recently switched to DOAC), SSS s/p PPM, DLD, HTN presents to the ED with a diagnosis of PE. Patient was seen at her cardiologist's office for LE swelling found to have B/L DVT while on Warfarin, so was started on Xarelto and a CTPE was ordered. After finding of L main PE with concerns for RV strain patient's cardiologist advised her to come to the ED. On arrival patient denied acute complaints.  (31 May 2024 19:17)    MEDICATIONS  (STANDING):  allopurinol 100 milliGRAM(s) Oral two times a day  atorvastatin 10 milliGRAM(s) Oral at bedtime  folic acid 1 milliGRAM(s) Oral daily  furosemide    Tablet 20 milliGRAM(s) Oral daily  heparin  Infusion.  Unit(s)/Hr (10 mL/Hr) IV Continuous <Continuous>  metoprolol succinate ER 50 milliGRAM(s) Oral daily  montelukast 10 milliGRAM(s) Oral at bedtime  multivitamin 1 Tablet(s) Oral daily  potassium chloride    Tablet ER 10 milliEquivalent(s) Oral daily  sodium chloride 0.9% lock flush 3 milliLiter(s) IV Push every 8 hours    MEDICATIONS  (PRN):  heparin   Injectable 4000 Unit(s) IV Push every 6 hours PRN For aPTT less than 40  heparin   Injectable 2000 Unit(s) IV Push every 6 hours PRN For aPTT between 40 - 57    Allergies  penicillin (Rash)  Eliquis (Unknown)    Vital Signs Last 24 Hrs  T(C): 36.7 (01 Jun 2024 08:42), Max: 36.7 (01 Jun 2024 08:42)  T(F): 98 (01 Jun 2024 08:42), Max: 98 (01 Jun 2024 08:42)  HR: 59 (01 Jun 2024 08:42) (59 - 65)  BP: 123/54 (01 Jun 2024 08:42) (123/54 - 143/63)  BP(mean): 86 (31 May 2024 16:21) (86 - 86)  RR: 18 (01 Jun 2024 08:42) (18 - 18)  SpO2: 100% (01 Jun 2024 08:42) (99% - 100%)    Parameters below as of 01 Jun 2024 08:42  Patient On (Oxygen Delivery Method): room air        PHYSICAL EXAMINATION:    GENERAL APPEARANCE:  Pt. is not currently dyspneic, in no distress. Pt. is alert, oriented, and pleasant.  HEENT:  Pupils are normal and react normally. No icterus. Mucous membranes well colored.  NECK:  Supple. No lymphadenopathy. Jugular venous pressure not elevated. Carotids equal.   HEART:   The cardiac impulse has a normal quality. There are no murmurs, rubs or gallops noted  CHEST:  Chest is clear to auscultation. Normal respiratory effort.  ABDOMEN:  Soft and nontender.   EXTREMITIES:  There is no edema.   SKIN:  No rash or significant lesions are noted.    LABS:                        10.1   10.27 )-----------( 543      ( 01 Jun 2024 07:19 )             33.2     06-01    140  |  109<H>  |  36<H>  ----------------------------<  82  4.4   |  26  |  1.05    Ca    9.1      01 Jun 2024 07:19  Phos  3.2     06-01  Mg     2.8     06-01    TPro  5.8<L>  /  Alb  2.4<L>  /  TBili  0.4  /  DBili  x   /  AST  29  /  ALT  33  /  AlkPhos  204<H>  06-01    PT/INR - ( 01 Jun 2024 07:19 )   PT: 25.9 sec;   INR: 2.35 ratio         PTT - ( 01 Jun 2024 12:44 )  PTT:43.1 sec  Urinalysis Basic - ( 01 Jun 2024 07:19 )    Color: x / Appearance: x / SG: x / pH: x  Gluc: 82 mg/dL / Ketone: x  / Bili: x / Urobili: x   Blood: x / Protein: x / Nitrite: x   Leuk Esterase: x / RBC: x / WBC x   Sq Epi: x / Non Sq Epi: x / Bacteria: x    RADIOLOGY & ADDITIONAL TESTS:    TTE (5/31/24):  1. Left ventricular cavity is normal in size. Left ventricular systolic function is low normal with an ejection fraction of 52 % by Aguirre's method of disks. 2. Apical septal segment is abnormal. 3. The left atrium is severely dilated. 4. The right atrium is severely dilated. 5. Moderate mitral regurgitation. 6. Moderate to severe tricuspid regurgitation. 7. Mild aortic regurgitation. 8. Mild aortic stenosis. 9. No left ventricular hypertrophy. 10. Severe pulmonary hypertension. Critical Findings: Newly diagnosed Severe Pulmonary Hypertension (RVSP >65mmHg) and RV strain (Possible Pulmonary Embolus). Dr Wright was informed of the findings by Estela Morgan in person on 5/31/2024 at 3:20:00 PM.    ASSESSMENT & PLAN:

## 2024-06-01 NOTE — DIETITIAN INITIAL EVALUATION ADULT - ADD RECOMMEND
1) C/w DASH/TLC diet as prescribed.  Encourage protein-rich foods, maximize food preferences   2) Add Ensure + HP shake TID to optimize nutritional needs (provides 350 kcal, 20g protein/ shake)   3) Monitor bowel movements, if no BM for >3 days, consider implementing bowel regimen.   4) Recommend to add MVI w/minerals, Vit C 500 mg BID, add Zinc Sulfate 220 mg x 10 days to promote wound healing.   6) Obtain weekly wt to track/trend changes   7) Obtain vitamin D 25OH level to assess nutriture, consider checking B6, B12, thiamine, folate, carnitine, and copper levels as malnutrition in cause these to be deficient   8) Confirm goals of care regarding nutrition support   RD will continue to monitor PO intake, labs, hydration, and wt prn.

## 2024-06-01 NOTE — DIETITIAN INITIAL EVALUATION ADULT - PERTINENT MEDS FT
MEDICATIONS  (STANDING):  allopurinol 100 milliGRAM(s) Oral two times a day  atorvastatin 10 milliGRAM(s) Oral at bedtime  folic acid 1 milliGRAM(s) Oral daily  furosemide    Tablet 20 milliGRAM(s) Oral daily  heparin  Infusion.  Unit(s)/Hr (10 mL/Hr) IV Continuous <Continuous>  metoprolol succinate ER 50 milliGRAM(s) Oral daily  montelukast 10 milliGRAM(s) Oral at bedtime  multivitamin 1 Tablet(s) Oral daily  potassium chloride    Tablet ER 10 milliEquivalent(s) Oral daily  sodium chloride 0.9% lock flush 3 milliLiter(s) IV Push every 8 hours    MEDICATIONS  (PRN):  heparin   Injectable 4000 Unit(s) IV Push every 6 hours PRN For aPTT less than 40  heparin   Injectable 2000 Unit(s) IV Push every 6 hours PRN For aPTT between 40 - 57

## 2024-06-01 NOTE — DIETITIAN INITIAL EVALUATION ADULT - LAB (SPECIFY)
Obtain vitamin D 25OH level to assess nutriture, consider checking B6, B12, thiamine, folate, carnitine, and copper levels as malnutrition in cause these to be deficient

## 2024-06-01 NOTE — CONSULT NOTE ADULT - SUBJECTIVE AND OBJECTIVE BOX
Patient is a 89y old  Female who presents with a chief complaint of PE.       HPI:  89-year-old female with PMH of HFpEF, CAD, Afib (on Warfarin and recently switched to DOAC), SSS s/p PPM, DLD, HTN presents to the ED with a diagnosis of PE. Patient was seen at her cardiologist's office for LE swelling found to have B/L DVT while on Warfarin, so was started on Xarelto and a CTPE was ordered. After finding of L main PE with concerns for RV strain patient's cardiologist advised her to come to the ED. On arrival patient denied acute complaints.     Mrs Frazier  was seen and examined by me this morning.    She denies any chest pain or pressure.    She denies any shortness of breath.    She states that recently she had poor p.o. intake secondary to her dentition.    She admits weight loss.    Admits bilateral lower extremity swelling      PAST MEDICAL & SURGICAL HISTORY:  HTN (hypertension)      Afib      Sleep apnea      Hyperlipidemia      Pacemaker      SSS (sick sinus syndrome)      Carotid artery disease      Chronic CHF      Pacemaker          MEDICATIONS  (STANDING):  allopurinol 100 milliGRAM(s) Oral two times a day  atorvastatin 10 milliGRAM(s) Oral at bedtime  folic acid 1 milliGRAM(s) Oral daily  furosemide    Tablet 20 milliGRAM(s) Oral daily  heparin  Infusion.  Unit(s)/Hr (10 mL/Hr) IV Continuous <Continuous>  metoprolol succinate ER 50 milliGRAM(s) Oral daily  montelukast 10 milliGRAM(s) Oral at bedtime  multivitamin 1 Tablet(s) Oral daily  potassium chloride    Tablet ER 10 milliEquivalent(s) Oral daily  sodium chloride 0.9% lock flush 3 milliLiter(s) IV Push every 8 hours    MEDICATIONS  (PRN):  heparin   Injectable 4000 Unit(s) IV Push every 6 hours PRN For aPTT less than 40  heparin   Injectable 2000 Unit(s) IV Push every 6 hours PRN For aPTT between 40 - 57      FAMILY HISTORY:  CAD (coronary artery disease) (Father, Mother)    Family history of breast cancer in sister (Sibling)        SOCIAL HISTORY: no recent smoking    REVIEW OF SYSTEMS:  CONSTITUTIONAL:    No fatigue, malaise, lethargy.  No fever or chills.  RESPIRATORY:  No cough.  No wheeze.  No hemoptysis.    CARDIOVASCULAR:  No chest pains.  No palpitations. No shortness of breath, No orthopnea or PND,  complains of lower extremity swelling  GASTROINTESTINAL:  No abdominal pain.  No nausea or vomiting.    GENITOURINARY:    No hematuria.    MUSCULOSKELETAL:  No musculoskeletal pain.  No joint swelling.  No arthritis.  NEUROLOGICAL:  No tingling or numbness or weakness.  PSYCHIATRIC:  No confusion  SKIN:  No rashes.            Vital Signs Last 24 Hrs  T(C): 36.6 (31 May 2024 20:48), Max: 36.6 (31 May 2024 20:48)  T(F): 97.9 (31 May 2024 20:48), Max: 97.9 (31 May 2024 20:48)  HR: 60 (31 May 2024 20:48) (60 - 78)  BP: 132/59 (31 May 2024 20:48) (132/59 - 148/77)  BP(mean): 86 (31 May 2024 16:21) (86 - 86)  RR: 18 (31 May 2024 20:48) (18 - 18)  SpO2: 99% (31 May 2024 20:48) (97% - 100%)    Parameters below as of 31 May 2024 20:48  Patient On (Oxygen Delivery Method): room air        PHYSICAL EXAM-    Constitutional:  no acute distress , elderly frail female    Head: Head is normocephalic and atraumatic.      Neck:  No JVD.     Cardiovascular: Regular rate and rhythm without S3, S4. No murmurs or rubs are appreciated.      Respiratory: Breathsounds are normal. No rales. No wheezing.    Abdomen: Soft, nontender, nondistended with positive bowel sounds.      Extremity: No tenderness.  3+ bilateral pedal and calf  pitting edema     Neurologic: The patient is alert and oriented.      Skin: No rash, no obvious lesions noted.      Psychiatric: The patient appears to be emotionally stable.      INTERPRETATION OF TELEMETRY: V paced, atrial fibrillation    ECG:  no EKG in the chart or EMR, ordered one    I&O's Detail    31 May 2024 07:01  -  01 Jun 2024 07:00  --------------------------------------------------------  IN:  Total IN: 0 mL    OUT:    Voided (mL): 450 mL  Total OUT: 450 mL    Total NET: -450 mL          LABS:                        11.9   11.37 )-----------( 592      ( 31 May 2024 14:14 )             38.7     05-31    137  |  107  |  34<H>  ----------------------------<  95  4.6   |  26  |  1.06    Ca    9.4      31 May 2024 14:14    TPro  7.3  /  Alb  2.8<L>  /  TBili  0.5  /  DBili  x   /  AST  42<H>  /  ALT  40  /  AlkPhos  249<H>  05-31        PT/INR - ( 01 Jun 2024 00:37 )   PT: 33.9 sec;   INR: 3.10 ratio         PTT - ( 31 May 2024 19:51 )  PTT:156.3 sec  Urinalysis Basic - ( 31 May 2024 14:14 )    Color: x / Appearance: x / SG: x / pH: x  Gluc: 95 mg/dL / Ketone: x  / Bili: x / Urobili: x   Blood: x / Protein: x / Nitrite: x   Leuk Esterase: x / RBC: x / WBC x   Sq Epi: x / Non Sq Epi: x / Bacteria: x      I&O's Summary    31 May 2024 07:01  -  01 Jun 2024 07:00  --------------------------------------------------------  IN: 0 mL / OUT: 450 mL / NET: -450 mL      BNP  RADIOLOGY & ADDITIONAL STUDIES:  CT chest angio report in chart paper copy with MARISELA FARAH
HPI:  89-year-old female with PMH  multiple significant medical issues including  - JAK2 positive thrombocytosis/myeloproliferative neoplasm  previously on hydroxyurea, stopped as  platelet count normalized,  July 2023  - seronegative rheumatoid arthritis recently discontinued methotrexate  - recent significant gingivitis and osteonecrosis of the mouthof  - HFpEF, CAD, Afib (on Warfarin and recently switched to DOAC), SSS s/p PPM, DLD, HTN     presents to the ED with a diagnosis of  yesterday with  ultrasound duplex Doppler bilateral lower extremities   bilateral DVT    occlusive right lower femoral vein, right popliteal vein, right calf veins   occlusive DVT left common femoral vein   CT angio chest with pulmonary embolism   distal left main pulmonary artery filling defect extends to lobar and segmental vessels left upper lobe and segmental right upper lobe pulmonary artery filling defect compatible with pulmonary emboli  - central pulmonary arteries mildly dilated   cardiomegaly biatrial dilatation without asymmetric right heart dilatation   extensive atherosclerotic calcifications   trace pleural effusions   -NO evidence of malignancy in chest or imaged upper abdomen. Patient was seen at her cardiologist's office for LE swelling found to have B/L DVT while on Warfarin, so was started on Xarelto and a CTPE was ordered. After finding of L main PE with concerns for RV strain patient's cardiologist advised her to come to the ED. On arrival patient denied acute complaints.  (31 May 2024 19:17)    MEDICATIONS  (STANDING):  allopurinol 100 milliGRAM(s) Oral two times a day  atorvastatin 10 milliGRAM(s) Oral at bedtime  folic acid 1 milliGRAM(s) Oral daily  furosemide    Tablet 20 milliGRAM(s) Oral daily  heparin  Infusion.  Unit(s)/Hr (10 mL/Hr) IV Continuous <Continuous>  metoprolol succinate ER 50 milliGRAM(s) Oral daily  montelukast 10 milliGRAM(s) Oral at bedtime  multivitamin 1 Tablet(s) Oral daily  potassium chloride    Tablet ER 10 milliEquivalent(s) Oral daily  sodium chloride 0.9% lock flush 3 milliLiter(s) IV Push every 8 hours    MEDICATIONS  (PRN):  heparin   Injectable 4000 Unit(s) IV Push every 6 hours PRN For aPTT less than 40  heparin   Injectable 2000 Unit(s) IV Push every 6 hours PRN For aPTT between 40 - 57    Allergies  penicillin (Rash)  Eliquis (Unknown)    Vital Signs Last 24 Hrs  T(C): 36.7 (01 Jun 2024 08:42), Max: 36.7 (01 Jun 2024 08:42)  T(F): 98 (01 Jun 2024 08:42), Max: 98 (01 Jun 2024 08:42)  HR: 59 (01 Jun 2024 08:42) (59 - 65)  BP: 123/54 (01 Jun 2024 08:42) (123/54 - 143/63)  BP(mean): 86 (31 May 2024 16:21) (86 - 86)  RR: 18 (01 Jun 2024 08:42) (18 - 18)  SpO2: 100% (01 Jun 2024 08:42) (99% - 100%)    Parameters below as of 01 Jun 2024 08:42  Patient On (Oxygen Delivery Method): room air      LABS:                        10.1   10.27 )-----------( 543      ( 01 Jun 2024 07:19 )             33.2     06-01    140  |  109<H>  |  36<H>  ----------------------------<  82  4.4   |  26  |  1.05    Ca    9.1      01 Jun 2024 07:19  Phos  3.2     06-01  Mg     2.8     06-01    TPro  5.8<L>  /  Alb  2.4<L>  /  TBili  0.4  /  DBili  x   /  AST  29  /  ALT  33  /  AlkPhos  204<H>  06-01    PT/INR - ( 01 Jun 2024 07:19 )   PT: 25.9 sec;   INR: 2.35 ratio         PTT - ( 01 Jun 2024 12:44 )  PTT:43.1 sec  Urinalysis Basic - ( 01 Jun 2024 07:19 )    Color: x / Appearance: x / SG: x / pH: x  Gluc: 82 mg/dL / Ketone: x  / Bili: x / Urobili: x   Blood: x / Protein: x / Nitrite: x   Leuk Esterase: x / RBC: x / WBC x   Sq Epi: x / Non Sq Epi: x / Bacteria: x    RADIOLOGY & ADDITIONAL TESTS:    TTE (5/31/24):  1. Left ventricular cavity is normal in size. Left ventricular systolic function is low normal with an ejection fraction of 52 % by Aguirre's method of disks. 2. Apical septal segment is abnormal. 3. The left atrium is severely dilated. 4. The right atrium is severely dilated. 5. Moderate mitral regurgitation. 6. Moderate to severe tricuspid regurgitation. 7. Mild aortic regurgitation. 8. Mild aortic stenosis. 9. No left ventricular hypertrophy. 10. Severe pulmonary hypertension. Critical Findings: Newly diagnosed Severe Pulmonary Hypertension (RVSP >65mmHg) and RV strain (Possible Pulmonary Embolus). Dr Wright was informed of the findings by Estela Morgan in person on 5/31/2024 at 3:20:00 PM.    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   seronegative rheumatoid arthritis   extensive cardiac disease dyslipidemia   despite being on  warfarin  now presents with bilateral PE and bilateral DVT     patient has multiple reasons for being thrombophilic including myeloproliferative neoplasm now with newly increased platelets   seronegative rheumatoid arthritis   atrial fibrillation and significant heart disease and dyslipidemia     CT scan chest abdomen and pelvis [facility] April 27, 2024 without evidence of malignancy   yesterday CT chest angiogram without evidence of malignancy     plan   agree with strategy to change over to heparin   will review old charts and probably restart treatment for her thrombocythemia, most likely reinitiate hydroxyurea      in light of anemia check iron and B12 studies     monitor CBC closely     above reviewed with hospitalist service

## 2024-06-01 NOTE — CONSULT NOTE ADULT - ASSESSMENT
PE, bilateral DVT   She is on anticoagulation with Xarelto as an outpatient.    Currently INR is elevated and heparin is on standby when the INR drops.    Management per primary team   Probably she needs workup for malignancy    I reviewed all the echocardiogram images and there is no evidence of RV strain.      Atrial fibrillation   On anticoagulation     s/p PPM-  Normal pacing function of the telemetry.       chronic heart failure with preserved ejection fraction   She appears euvolemic on physical exam.    Recommend continuation of the current medical regimen.      Other medical issues- Management per primary team.   Thank you for allowing me to participate in the care of this patient. Please feel free to contact me with any questions.

## 2024-06-01 NOTE — DIETITIAN INITIAL EVALUATION ADULT - [id number]_GEN_A_CORE
I&O's Detail    31 May 2024 07:01  -  01 Jun 2024 07:00  --------------------------------------------------------  IN:  Total IN: 0 mL    OUT:    Voided (mL): 450 mL  Total OUT: 450 mL    Total NET: -450 mL

## 2024-06-01 NOTE — DIETITIAN INITIAL EVALUATION ADULT - ETIOLOGY
r/t decreased ability to meet increased nutrient needs 2/2 PI stg II or >, persistent lack of appetite, osteonecrosis of the jaw

## 2024-06-01 NOTE — PROGRESS NOTE ADULT - ASSESSMENT
89-year-old female with PMH of HFpEF, CAD, Afib (on Warfarin and recently switched to DOAC), SSS s/p PPM, DLD, HTN presents to the ED admitted with B/L DVT while on Warfarin, and L main PE with ? RV strain.    # Pulmonary embolism.   B/L DVT and L main PE diagnosed at outside facility. Echo here with ? evidence of RV strain, reviewed by Cardiology today and as per them no RV strain. Trop negative, BNP 4300. Intermediate risk PE.  INR 4.5 on admission (previously on Warfarin and recently switched to Xarelto), now normalized and will start Heparin ggt.  Follow Cardiology recommendations. They want to consider malignancy workup. Discussed with patient's hem/onc group (Dr. Stapleton from [facility]), they reviewed recent imaging and state malignancy is unlikely, and attribute patient's prothrombotic state to underlying thrombocytosis (management below).     # Deep vein thrombosis (DVT):  As above.    # Chronic atrial fibrillation.   Continue BB (Toprol). Elevated [id number] on DOAC (Wafarin recently switched to Xarelto). Will anticoagulate with Heparin ggt as above.    # Chronic heart failure with preserved ejection fraction (HFpEF).   BNP elevated in the setting of PE with RV strain. No signs of ADHF. On Lasix 20 mg daily at home, continue.    # SSS (sick sinus syndrome).   SSS s/p PPM. V-paced on EKG. Continue to follow with Cardiology.    # Essential thrombocytosis:  Likely culprit for patient's thromboembolic event. PLTs persistently in 500. Start BASA and Heparin per discussion with Hematology. Patient was previously on Hydroxyurea, will need to resume once official Hem/Onc recs are in place. Trend CBC.    # Healthcare maintenance.   VTE prophylaxis: AC as above.

## 2024-06-02 LAB
ALBUMIN SERPL ELPH-MCNC: 2.5 G/DL — LOW (ref 3.3–5)
ALP SERPL-CCNC: 213 U/L — HIGH (ref 40–120)
ALT FLD-CCNC: 29 U/L — SIGNIFICANT CHANGE UP (ref 12–78)
ANION GAP SERPL CALC-SCNC: 6 MMOL/L — SIGNIFICANT CHANGE UP (ref 5–17)
APTT BLD: > 200 SEC (ref 24.5–35.6)
AST SERPL-CCNC: 31 U/L — SIGNIFICANT CHANGE UP (ref 15–37)
BILIRUB SERPL-MCNC: 0.5 MG/DL — SIGNIFICANT CHANGE UP (ref 0.2–1.2)
BUN SERPL-MCNC: 34 MG/DL — HIGH (ref 7–23)
CALCIUM SERPL-MCNC: 9.3 MG/DL — SIGNIFICANT CHANGE UP (ref 8.5–10.1)
CHLORIDE SERPL-SCNC: 109 MMOL/L — HIGH (ref 96–108)
CO2 SERPL-SCNC: 24 MMOL/L — SIGNIFICANT CHANGE UP (ref 22–31)
CREAT SERPL-MCNC: 1.06 MG/DL — SIGNIFICANT CHANGE UP (ref 0.5–1.3)
EGFR: 50 ML/MIN/1.73M2 — LOW
ERYTHROCYTE [SEDIMENTATION RATE] IN BLOOD: 28 MM/HR — HIGH (ref 0–20)
FERRITIN SERPL-MCNC: 471 NG/ML — HIGH (ref 13–330)
FOLATE SERPL-MCNC: >20 NG/ML — SIGNIFICANT CHANGE UP
GLUCOSE SERPL-MCNC: 104 MG/DL — HIGH (ref 70–99)
HCT VFR BLD CALC: 33.5 % — LOW (ref 34.5–45)
HCT VFR BLD CALC: 36.6 % — SIGNIFICANT CHANGE UP (ref 34.5–45)
HGB BLD-MCNC: 10.4 G/DL — LOW (ref 11.5–15.5)
HGB BLD-MCNC: 11.1 G/DL — LOW (ref 11.5–15.5)
IRON SATN MFR SERPL: 18 % — SIGNIFICANT CHANGE UP (ref 14–50)
IRON SATN MFR SERPL: 32 UG/DL — SIGNIFICANT CHANGE UP (ref 30–160)
MAGNESIUM SERPL-MCNC: 2.6 MG/DL — SIGNIFICANT CHANGE UP (ref 1.6–2.6)
MCHC RBC-ENTMCNC: 29.3 PG — SIGNIFICANT CHANGE UP (ref 27–34)
MCHC RBC-ENTMCNC: 29.5 PG — SIGNIFICANT CHANGE UP (ref 27–34)
MCHC RBC-ENTMCNC: 30.3 GM/DL — LOW (ref 32–36)
MCHC RBC-ENTMCNC: 31 GM/DL — LOW (ref 32–36)
MCV RBC AUTO: 95.2 FL — SIGNIFICANT CHANGE UP (ref 80–100)
MCV RBC AUTO: 96.6 FL — SIGNIFICANT CHANGE UP (ref 80–100)
PHOSPHATE SERPL-MCNC: 2.6 MG/DL — SIGNIFICANT CHANGE UP (ref 2.5–4.5)
PLATELET # BLD AUTO: 498 K/UL — HIGH (ref 150–400)
PLATELET # BLD AUTO: 504 K/UL — HIGH (ref 150–400)
POTASSIUM SERPL-MCNC: 3.9 MMOL/L — SIGNIFICANT CHANGE UP (ref 3.5–5.3)
POTASSIUM SERPL-SCNC: 3.9 MMOL/L — SIGNIFICANT CHANGE UP (ref 3.5–5.3)
PROT SERPL-MCNC: 6.3 GM/DL — SIGNIFICANT CHANGE UP (ref 6–8.3)
RBC # BLD: 3.52 M/UL — LOW (ref 3.8–5.2)
RBC # BLD: 3.79 M/UL — LOW (ref 3.8–5.2)
RBC # FLD: 20.3 % — HIGH (ref 10.3–14.5)
RBC # FLD: 20.5 % — HIGH (ref 10.3–14.5)
SODIUM SERPL-SCNC: 139 MMOL/L — SIGNIFICANT CHANGE UP (ref 135–145)
TIBC SERPL-MCNC: 176 UG/DL — LOW (ref 220–430)
UIBC SERPL-MCNC: 143 UG/DL — SIGNIFICANT CHANGE UP (ref 110–370)
VIT B12 SERPL-MCNC: 1237 PG/ML — SIGNIFICANT CHANGE UP (ref 232–1245)
WBC # BLD: 11.41 K/UL — HIGH (ref 3.8–10.5)
WBC # BLD: 12.12 K/UL — HIGH (ref 3.8–10.5)
WBC # FLD AUTO: 11.41 K/UL — HIGH (ref 3.8–10.5)
WBC # FLD AUTO: 12.12 K/UL — HIGH (ref 3.8–10.5)

## 2024-06-02 RX ORDER — HYDROXYUREA 500 MG/1
500 CAPSULE ORAL ONCE
Refills: 0 | Status: COMPLETED | OUTPATIENT
Start: 2024-06-02 | End: 2024-06-02

## 2024-06-02 RX ADMIN — Medication 100 MILLIGRAM(S): at 11:10

## 2024-06-02 RX ADMIN — SODIUM CHLORIDE 3 MILLILITER(S): 9 INJECTION INTRAMUSCULAR; INTRAVENOUS; SUBCUTANEOUS at 12:51

## 2024-06-02 RX ADMIN — SODIUM CHLORIDE 3 MILLILITER(S): 9 INJECTION INTRAMUSCULAR; INTRAVENOUS; SUBCUTANEOUS at 05:16

## 2024-06-02 RX ADMIN — Medication 10 MILLIEQUIVALENT(S): at 11:09

## 2024-06-02 RX ADMIN — Medication 50 MILLIGRAM(S): at 11:10

## 2024-06-02 RX ADMIN — HEPARIN SODIUM 0 UNIT(S)/HR: 5000 INJECTION INTRAVENOUS; SUBCUTANEOUS at 19:39

## 2024-06-02 RX ADMIN — HEPARIN SODIUM 0 UNIT(S)/HR: 5000 INJECTION INTRAVENOUS; SUBCUTANEOUS at 04:03

## 2024-06-02 RX ADMIN — HEPARIN SODIUM 0 UNIT(S)/HR: 5000 INJECTION INTRAVENOUS; SUBCUTANEOUS at 11:37

## 2024-06-02 RX ADMIN — Medication 1 TABLET(S): at 11:10

## 2024-06-02 RX ADMIN — SODIUM CHLORIDE 3 MILLILITER(S): 9 INJECTION INTRAMUSCULAR; INTRAVENOUS; SUBCUTANEOUS at 20:49

## 2024-06-02 RX ADMIN — Medication 100 MILLIGRAM(S): at 20:49

## 2024-06-02 RX ADMIN — Medication 1 MILLIGRAM(S): at 11:09

## 2024-06-02 RX ADMIN — HEPARIN SODIUM 900 UNIT(S)/HR: 5000 INJECTION INTRAVENOUS; SUBCUTANEOUS at 12:46

## 2024-06-02 RX ADMIN — MONTELUKAST 10 MILLIGRAM(S): 4 TABLET, CHEWABLE ORAL at 20:49

## 2024-06-02 RX ADMIN — HEPARIN SODIUM 1100 UNIT(S)/HR: 5000 INJECTION INTRAVENOUS; SUBCUTANEOUS at 04:57

## 2024-06-02 RX ADMIN — HEPARIN SODIUM 700 UNIT(S)/HR: 5000 INJECTION INTRAVENOUS; SUBCUTANEOUS at 20:47

## 2024-06-02 RX ADMIN — HYDROXYUREA 500 MILLIGRAM(S): 500 CAPSULE ORAL at 16:16

## 2024-06-02 RX ADMIN — HEPARIN SODIUM 900 UNIT(S)/HR: 5000 INJECTION INTRAVENOUS; SUBCUTANEOUS at 19:06

## 2024-06-02 RX ADMIN — Medication 20 MILLIGRAM(S): at 11:10

## 2024-06-02 RX ADMIN — HEPARIN SODIUM 1100 UNIT(S)/HR: 5000 INJECTION INTRAVENOUS; SUBCUTANEOUS at 07:21

## 2024-06-02 RX ADMIN — ATORVASTATIN CALCIUM 10 MILLIGRAM(S): 80 TABLET, FILM COATED ORAL at 20:49

## 2024-06-02 NOTE — PROGRESS NOTE ADULT - SUBJECTIVE AND OBJECTIVE BOX
Patient is a 89y old  Female who presents with a chief complaint of PE.       HPI:  89-year-old female with PMH of HFpEF, CAD, Afib (on Warfarin and recently switched to DOAC), SSS s/p PPM, DLD, HTN presents to the ED with a diagnosis of PE. Patient was seen at her cardiologist's office for LE swelling found to have B/L DVT while on Warfarin, so was started on Xarelto and a CTPE was ordered. After finding of L main PE with concerns for RV strain patient's cardiologist advised her to come to the ED. On arrival patient denied acute complaints.   6/1  Mrs Frazier  was seen and examined by me this morning.    She denies any chest pain or pressure.    She denies any shortness of breath.    She states that recently she had poor p.o. intake secondary to her dentition.    She admits weight loss.    Admits bilateral lower extremity swelling  6/2- Mrs Frazier Was seen and examined by me this morning.    Overnight issues   She was started on heparin as today.        PAST MEDICAL & SURGICAL HISTORY:  HTN (hypertension)      Afib      Sleep apnea      Hyperlipidemia      Pacemaker      SSS (sick sinus syndrome)      Carotid artery disease      Chronic CHF      Pacemaker          MEDICATIONS  (STANDING):  allopurinol 100 milliGRAM(s) Oral two times a day  atorvastatin 10 milliGRAM(s) Oral at bedtime  folic acid 1 milliGRAM(s) Oral daily  furosemide    Tablet 20 milliGRAM(s) Oral daily  heparin  Infusion.  Unit(s)/Hr (10 mL/Hr) IV Continuous <Continuous>  metoprolol succinate ER 50 milliGRAM(s) Oral daily  montelukast 10 milliGRAM(s) Oral at bedtime  multivitamin 1 Tablet(s) Oral daily  potassium chloride    Tablet ER 10 milliEquivalent(s) Oral daily  sodium chloride 0.9% lock flush 3 milliLiter(s) IV Push every 8 hours    MEDICATIONS  (PRN):  heparin   Injectable 4000 Unit(s) IV Push every 6 hours PRN For aPTT less than 40  heparin   Injectable 2000 Unit(s) IV Push every 6 hours PRN For aPTT between 40 - 57      FAMILY HISTORY:  CAD (coronary artery disease) (Father, Mother)    Family history of breast cancer in sister (Sibling)        SOCIAL HISTORY: no recent smoking    REVIEW OF SYSTEMS:  CONSTITUTIONAL:    No fatigue, malaise, lethargy.  No fever or chills.  RESPIRATORY:  No cough.  No wheeze.  No hemoptysis.    CARDIOVASCULAR:  No chest pains.  No palpitations. No shortness of breath, No orthopnea or PND,  complains of lower extremity swelling  GASTROINTESTINAL:  No abdominal pain.  No nausea or vomiting.    GENITOURINARY:    No hematuria.    MUSCULOSKELETAL:  No musculoskeletal pain.  No joint swelling.  No arthritis.  NEUROLOGICAL:  No tingling or numbness or weakness.  PSYCHIATRIC:  No confusion  SKIN:  No rashes.      AICU Vital Signs Last 24 Hrs  T(C): 36.7 (01 Jun 2024 23:22), Max: 36.7 (01 Jun 2024 08:42)  T(F): 98.1 (01 Jun 2024 23:22), Max: 98.1 (01 Jun 2024 23:22)  HR: 61 (01 Jun 2024 23:22) (59 - 62)  BP: 102/41 (01 Jun 2024 23:22) (102/41 - 123/54)  BP(mean): 56 (01 Jun 2024 23:22) (56 - 96)  ABP: --  ABP(mean): --  RR: 18 (01 Jun 2024 08:42) (18 - 18)  SpO2: 96% (01 Jun 2024 23:22) (96% - 100%)    O2 Parameters below as of 01 Jun 2024 15:46  Patient On (Oxygen Delivery Method): room air    PHYSICAL EXAM-    Constitutional:  no acute distress , elderly frail female    Head: Head is normocephalic and atraumatic.      Neck:  No JVD.     Cardiovascular: Regular rate and rhythm without S3, S4. No murmurs or rubs are appreciated.      Respiratory: Breathsounds are normal. No rales. No wheezing.    Abdomen: Soft, nontender, nondistended with positive bowel sounds.      Extremity: No tenderness.  3+ bilateral pedal and calf  pitting edema     Neurologic: The patient is alert and oriented.      Skin: No rash, no obvious lesions noted.      Psychiatric: The patient appears to be emotionally stable.      INTERPRETATION OF TELEMETRY: V paced, atrial fibrillation    ECG:  no EKG in the chart or EMR, ordered one    I&O's Detail    31 May 2024 07:01  -  01 Jun 2024 07:00  --------------------------------------------------------  IN:  Total IN: 0 mL    OUT:    Voided (mL): 450 mL  Total OUT: 450 mL    Total NET: -450 mL          LABS:                                   10.4   12.12 )-----------( 498      ( 01 Jun 2024 23:12 )             33.5     06-01    140  |  109<H>  |  36<H>  ----------------------------<  82  4.4   |  26  |  1.05    Ca    9.1      01 Jun 2024 07:19  Phos  3.2     06-01  Mg     2.8     06-01    TPro  5.8<L>  /  Alb  2.4<L>  /  TBili  0.4  /  DBili  x   /  AST  29  /  ALT  33  /  AlkPhos  204<H>  06-01        LIVER FUNCTIONS - ( 01 Jun 2024 07:19 )  Alb: 2.4 g/dL / Pro: 5.8 gm/dL / ALK PHOS: 204 U/L / ALT: 33 U/L / AST: 29 U/L / GGT: x           PT/INR - ( 01 Jun 2024 07:19 )   PT: 25.9 sec;   INR: 2.35 ratio         PTT - ( 02 Jun 2024 03:08 )  PTT:> 200 sec            PT/INR - ( 01 Jun 2024 00:37 )   PT: 33.9 sec;   INR: 3.10 ratio         PTT - ( 31 May 2024 19:51 )  PTT:156.3 sec  Urinalysis Basic - ( 31 May 2024 14:14 )    Color: x / Appearance: x / SG: x / pH: x  Gluc: 95 mg/dL / Ketone: x  / Bili: x / Urobili: x   Blood: x / Protein: x / Nitrite: x   Leuk Esterase: x / RBC: x / WBC x   Sq Epi: x / Non Sq Epi: x / Bacteria: x      I&O's Summary    31 May 2024 07:01  -  01 Jun 2024 07:00  --------------------------------------------------------  IN: 0 mL / OUT: 450 mL / NET: -450 mL      BNP  RADIOLOGY & ADDITIONAL STUDIES:  CT chest angio report in chart paper copy with MARISELA FARAH

## 2024-06-02 NOTE — PROGRESS NOTE ADULT - ASSESSMENT
89-year-old female with PMH of HFpEF, CAD, Afib (on Warfarin and recently switched to DOAC), SSS s/p PPM, DLD, HTN presents to the ED admitted with B/L DVT while on Warfarin, and L main PE with ? RV strain.    # Pulmonary embolism.   B/L DVT and L main PE diagnosed at outside facility. Echo here with ? evidence of RV strain, reviewed by Cardiology today and as per them no RV strain. Trop negative, BNP 4300. Intermediate risk PE.  INR 4.5 on admission (previously on Warfarin with labile INRs and recently switched to Xarelto), now on Heparin ggt.  Follow Cardiology recommendations. Hematology on board as well, states patient thrombophilic state is likely related to thrombocytosis (management below).    # Deep vein thrombosis (DVT):  As above.    # Chronic atrial fibrillation.   Continue BB (Toprol). Elevated [id number] on DOAC (Wafarin recently switched to Xarelto). Will anticoagulate with Heparin ggt as above.    # Chronic heart failure with preserved ejection fraction (HFpEF).   BNP elevated in the setting of PE with RV strain. No signs of ADHF. On Lasix 20 mg daily at home, continue.    # SSS (sick sinus syndrome).   SSS s/p PPM. V-paced on EKG. Continue to follow with Cardiology.    # Essential thrombocytosis:  Per Oncology, myeloproliferative neoplasm is likely culprit for patient's thromboembolic event. PLTs persistently in 500. Patient with poor nutritional status, iron deficiency suspected and may contribute to platelet disorders. Iron studies sent. Started BASA and continue Heparin per discussion with Hematology. Patient was previously on Hydroxyurea, will resume per Hem/Onc recs. Chronic AC alternatives (LMWH vs Warfarin vs DOAC) to be decided by Dr. Arenas on 6/3.    # Healthcare maintenance.   VTE prophylaxis: AC as above.

## 2024-06-02 NOTE — PROGRESS NOTE ADULT - SUBJECTIVE AND OBJECTIVE BOX
HPI:  89-year-old female with PMH of HFpEF, CAD, Afib (on Warfarin and recently switched to DOAC), SSS s/p PPM, DLD, HTN presents to the ED with a diagnosis of PE. Patient was seen at her cardiologist's office for LE swelling found to have B/L DVT while on Warfarin, so was started on Xarelto and a CTPE was ordered. After finding of L main PE with concerns for RV strain patient's cardiologist advised her to come to the ED. On arrival patient denied acute complaints.  (31 May 2024 19:17)    MEDICATIONS  (STANDING):  allopurinol 100 milliGRAM(s) Oral two times a day  atorvastatin 10 milliGRAM(s) Oral at bedtime  folic acid 1 milliGRAM(s) Oral daily  furosemide    Tablet 20 milliGRAM(s) Oral daily  heparin  Infusion.  Unit(s)/Hr (10 mL/Hr) IV Continuous <Continuous>  hydroxyurea 500 milliGRAM(s) Oral once  metoprolol succinate ER 50 milliGRAM(s) Oral daily  montelukast 10 milliGRAM(s) Oral at bedtime  multivitamin 1 Tablet(s) Oral daily  potassium chloride    Tablet ER 10 milliEquivalent(s) Oral daily  sodium chloride 0.9% lock flush 3 milliLiter(s) IV Push every 8 hours    MEDICATIONS  (PRN):  heparin   Injectable 4000 Unit(s) IV Push every 6 hours PRN For aPTT less than 40  heparin   Injectable 2000 Unit(s) IV Push every 6 hours PRN For aPTT between 40 - 57    Allergies  penicillin (Rash)  Eliquis (Unknown)    Vital Signs Last 24 Hrs  T(C): 36.6 (02 Jun 2024 08:50), Max: 36.7 (01 Jun 2024 23:22)  T(F): 97.8 (02 Jun 2024 08:50), Max: 98.1 (01 Jun 2024 23:22)  HR: 60 (02 Jun 2024 08:50) (60 - 62)  BP: 132/54 (02 Jun 2024 08:50) (102/41 - 132/54)  BP(mean): 56 (01 Jun 2024 23:22) (56 - 96)  RR: 18 (02 Jun 2024 08:50) (18 - 18)  SpO2: 95% (02 Jun 2024 08:50) (95% - 100%)    Parameters below as of 02 Jun 2024 08:50  Patient On (Oxygen Delivery Method): room air    PHYSICAL EXAMINATION:    GENERAL APPEARANCE:  Pt. is not currently dyspneic, in no distress. Pt. is alert, oriented, and pleasant.  HEENT:  Pupils are normal and react normally. No icterus. Mucous membranes well colored.  NECK:  Supple. No lymphadenopathy. Jugular venous pressure not elevated. Carotids equal.   HEART:   The cardiac impulse has a normal quality. There are no murmurs, rubs or gallops noted  CHEST:  Chest is clear to auscultation. Normal respiratory effort.  ABDOMEN:  Soft and nontender.   EXTREMITIES:  There is no edema.   SKIN:  No rash or significant lesions are noted.    LABS:                        11.1   11.41 )-----------( 504      ( 02 Jun 2024 09:21 )             36.6     06-02    139  |  109<H>  |  34<H>  ----------------------------<  104<H>  3.9   |  24  |  1.06    Ca    9.3      02 Jun 2024 09:21  Phos  2.6     06-02  Mg     2.6     06-02    TPro  6.3  /  Alb  2.5<L>  /  TBili  0.5  /  DBili  x   /  AST  31  /  ALT  29  /  AlkPhos  213<H>  06-02    PT/INR - ( 01 Jun 2024 07:19 )   PT: 25.9 sec;   INR: 2.35 ratio         PTT - ( 02 Jun 2024 09:22 )  PTT:> 200 sec  Urinalysis Basic - ( 02 Jun 2024 09:21 )    Color: x / Appearance: x / SG: x / pH: x  Gluc: 104 mg/dL / Ketone: x  / Bili: x / Urobili: x   Blood: x / Protein: x / Nitrite: x   Leuk Esterase: x / RBC: x / WBC x   Sq Epi: x / Non Sq Epi: x / Bacteria: x    RADIOLOGY & ADDITIONAL TESTS:    TTE (5/31/24):  1. Left ventricular cavity is normal in size. Left ventricular systolic function is low normal with an ejection fraction of 52 % by Aguirre's method of disks. 2. Apical septal segment is abnormal. 3. The left atrium is severely dilated. 4. The right atrium is severely dilated. 5. Moderate mitral regurgitation. 6. Moderate to severe tricuspid regurgitation. 7. Mild aortic regurgitation. 8. Mild aortic stenosis. 9. No left ventricular hypertrophy. 10. Severe pulmonary hypertension. Critical Findings: Newly diagnosed Severe Pulmonary Hypertension (RVSP >65mmHg) and RV strain (Possible Pulmonary Embolus). Dr Wright was informed of the findings by Estela Morgan in person on 5/31/2024 at 3:20:00 PM.    ASSESSMENT & PLAN:

## 2024-06-02 NOTE — PROVIDER CONTACT NOTE (CRITICAL VALUE NOTIFICATION) - ACTION/TREATMENT ORDERED:
Treatment as per Heparin nomogram
MD made aware, ordered to hold heparin gtt and draw repeat INR in 4 hours. Instructed to resume heparin gtt if repeat INR result is less than 2.5.

## 2024-06-02 NOTE — PROGRESS NOTE ADULT - ASSESSMENT
PE, bilateral DVT   She is on anticoagulation with Xarelto as an outpatient.    on heparin  Management per primary team   heme/onc team input noted    I reviewed all the echocardiogram images and there is no evidence of RV strain.       thrombophilia  Hematology team following the patient.    Management per Hematology.        Atrial fibrillation   On anticoagulation     s/p PPM-  Normal pacing function of the telemetry.       chronic heart failure with preserved ejection fraction   She appears euvolemic on physical exam.    Recommend continuation of the current medical regimen.      Other medical issues- Management per primary team.   Thank you for allowing me to participate in the care of this patient. Please feel free to contact me with any questions.

## 2024-06-02 NOTE — PROGRESS NOTE ADULT - ASSESSMENT
HPI:  89-year-old female with PMH  multiple significant medical issues including  - JAK2 positive thrombocytosis/myeloproliferative neoplasm  previously on hydroxyurea, stopped as  platelet count normalized,  July 2023  - seronegative rheumatoid arthritis recently discontinued methotrexate  - recent significant gingivitis and osteonecrosis of the mouthof  - HFpEF, CAD, Afib (on Warfarin and recently switched to DOAC), SSS s/p PPM, DLD, HTN     presents to the ED with a diagnosis of  yesterday with  ultrasound duplex Doppler bilateral lower extremities   bilateral DVT    occlusive right lower femoral vein, right popliteal vein, right calf veins   occlusive DVT left common femoral vein   CT angio chest with pulmonary embolism   distal left main pulmonary artery filling defect extends to lobar and segmental vessels left upper lobe and segmental right upper lobe pulmonary artery filling defect compatible with pulmonary emboli  - central pulmonary arteries mildly dilated   cardiomegaly biatrial dilatation without asymmetric right heart dilatation   extensive atherosclerotic calcifications   trace pleural effusions   -NO evidence of malignancy in chest or imaged upper abdomen. Patient was seen at her cardiologist's office for LE swelling found to have B/L DVT while on Warfarin, so was started on Xarelto and a CTPE was ordered. After finding of L main PE with concerns for RV strain patient's cardiologist advised her to come to the ED. On arrival patient denied acute complaints.  (31 May 2024 19:17)    MEDICATIONS  (STANDING):  allopurinol 100 milliGRAM(s) Oral two times a day  atorvastatin 10 milliGRAM(s) Oral at bedtime  folic acid 1 milliGRAM(s) Oral daily  furosemide    Tablet 20 milliGRAM(s) Oral daily  heparin  Infusion.  Unit(s)/Hr (10 mL/Hr) IV Continuous <Continuous>  metoprolol succinate ER 50 milliGRAM(s) Oral daily  montelukast 10 milliGRAM(s) Oral at bedtime  multivitamin 1 Tablet(s) Oral daily  potassium chloride    Tablet ER 10 milliEquivalent(s) Oral daily  sodium chloride 0.9% lock flush 3 milliLiter(s) IV Push every 8 hours       UPDATE June 2, 2024  Patienr seen  Case reviewed in detail w patient, daughter (on phone), granddaughter, and hospitalist.    Weeks ago pt was "septic", approx 6 days Iv antibiotics, then home on oral antibiotics.  INR was variable, noted to be below 2 on at least 1 weekly check.    Imp  DVTs, PE (new?)  while on warfarin  anemia  thrombocytosis    Multiple causes for thrombophilia:  JAK2 +   MPN  Rhematoid Arthritis  A Fib,  CAD, dydlipidemia,  Old frail, not very moblile  Recent infection w hospitalization, 12 days antibiotics    PLAN  continue heparin   Await iron level ; may be contributing to thrombocytosis     hydrea 500 today    Tomorrow primary cardiologist, hematologist to consider optimal anticoagulation for this complex patient:  _ Warfarin w very close home monitoring possibly daily or qod  vs lovenox or xarelto.     Reconsider Hydrea possibly  2 times a week?    Patient and family comfortable w plan.

## 2024-06-03 LAB
ANION GAP SERPL CALC-SCNC: 6 MMOL/L — SIGNIFICANT CHANGE UP (ref 5–17)
APTT BLD: 128.9 SEC — CRITICAL HIGH (ref 24.5–35.6)
APTT BLD: 63.9 SEC — HIGH (ref 24.5–35.6)
APTT BLD: 87.2 SEC — HIGH (ref 24.5–35.6)
BUN SERPL-MCNC: 33 MG/DL — HIGH (ref 7–23)
CALCIUM SERPL-MCNC: 8.7 MG/DL — SIGNIFICANT CHANGE UP (ref 8.5–10.1)
CHLORIDE SERPL-SCNC: 109 MMOL/L — HIGH (ref 96–108)
CO2 SERPL-SCNC: 24 MMOL/L — SIGNIFICANT CHANGE UP (ref 22–31)
CREAT SERPL-MCNC: 1.19 MG/DL — SIGNIFICANT CHANGE UP (ref 0.5–1.3)
EGFR: 44 ML/MIN/1.73M2 — LOW
GLUCOSE SERPL-MCNC: 79 MG/DL — SIGNIFICANT CHANGE UP (ref 70–99)
HCT VFR BLD CALC: 30.7 % — LOW (ref 34.5–45)
HGB BLD-MCNC: 9.4 G/DL — LOW (ref 11.5–15.5)
INR BLD: 1.24 RATIO — HIGH (ref 0.85–1.18)
MAGNESIUM SERPL-MCNC: 2.5 MG/DL — SIGNIFICANT CHANGE UP (ref 1.6–2.6)
MCHC RBC-ENTMCNC: 29.4 PG — SIGNIFICANT CHANGE UP (ref 27–34)
MCHC RBC-ENTMCNC: 30.6 GM/DL — LOW (ref 32–36)
MCV RBC AUTO: 95.9 FL — SIGNIFICANT CHANGE UP (ref 80–100)
PHOSPHATE SERPL-MCNC: 2.9 MG/DL — SIGNIFICANT CHANGE UP (ref 2.5–4.5)
PLATELET # BLD AUTO: 467 K/UL — HIGH (ref 150–400)
POTASSIUM SERPL-MCNC: 3.8 MMOL/L — SIGNIFICANT CHANGE UP (ref 3.5–5.3)
POTASSIUM SERPL-SCNC: 3.8 MMOL/L — SIGNIFICANT CHANGE UP (ref 3.5–5.3)
PROTHROM AB SERPL-ACNC: 13.9 SEC — HIGH (ref 9.5–13)
RBC # BLD: 3.2 M/UL — LOW (ref 3.8–5.2)
RBC # FLD: 20.1 % — HIGH (ref 10.3–14.5)
SODIUM SERPL-SCNC: 139 MMOL/L — SIGNIFICANT CHANGE UP (ref 135–145)
WBC # BLD: 10.86 K/UL — HIGH (ref 3.8–10.5)
WBC # FLD AUTO: 10.86 K/UL — HIGH (ref 3.8–10.5)

## 2024-06-03 PROCEDURE — 99232 SBSQ HOSP IP/OBS MODERATE 35: CPT

## 2024-06-03 RX ADMIN — HEPARIN SODIUM 500 UNIT(S)/HR: 5000 INJECTION INTRAVENOUS; SUBCUTANEOUS at 07:20

## 2024-06-03 RX ADMIN — ATORVASTATIN CALCIUM 10 MILLIGRAM(S): 80 TABLET, FILM COATED ORAL at 21:02

## 2024-06-03 RX ADMIN — SODIUM CHLORIDE 3 MILLILITER(S): 9 INJECTION INTRAMUSCULAR; INTRAVENOUS; SUBCUTANEOUS at 21:02

## 2024-06-03 RX ADMIN — Medication 50 MILLIGRAM(S): at 10:10

## 2024-06-03 RX ADMIN — HEPARIN SODIUM 500 UNIT(S)/HR: 5000 INJECTION INTRAVENOUS; SUBCUTANEOUS at 14:05

## 2024-06-03 RX ADMIN — HEPARIN SODIUM 500 UNIT(S)/HR: 5000 INJECTION INTRAVENOUS; SUBCUTANEOUS at 05:03

## 2024-06-03 RX ADMIN — HEPARIN SODIUM 0 UNIT(S)/HR: 5000 INJECTION INTRAVENOUS; SUBCUTANEOUS at 04:03

## 2024-06-03 RX ADMIN — Medication 1 TABLET(S): at 10:11

## 2024-06-03 RX ADMIN — Medication 20 MILLIGRAM(S): at 10:11

## 2024-06-03 RX ADMIN — Medication 1 MILLIGRAM(S): at 10:11

## 2024-06-03 RX ADMIN — SODIUM CHLORIDE 3 MILLILITER(S): 9 INJECTION INTRAMUSCULAR; INTRAVENOUS; SUBCUTANEOUS at 05:23

## 2024-06-03 RX ADMIN — SODIUM CHLORIDE 3 MILLILITER(S): 9 INJECTION INTRAMUSCULAR; INTRAVENOUS; SUBCUTANEOUS at 15:58

## 2024-06-03 RX ADMIN — HEPARIN SODIUM 500 UNIT(S)/HR: 5000 INJECTION INTRAVENOUS; SUBCUTANEOUS at 19:36

## 2024-06-03 RX ADMIN — MONTELUKAST 10 MILLIGRAM(S): 4 TABLET, CHEWABLE ORAL at 21:02

## 2024-06-03 RX ADMIN — Medication 100 MILLIGRAM(S): at 21:34

## 2024-06-03 RX ADMIN — HEPARIN SODIUM 500 UNIT(S)/HR: 5000 INJECTION INTRAVENOUS; SUBCUTANEOUS at 21:00

## 2024-06-03 RX ADMIN — Medication 10 MILLIEQUIVALENT(S): at 10:10

## 2024-06-03 RX ADMIN — Medication 100 MILLIGRAM(S): at 10:12

## 2024-06-03 NOTE — PROGRESS NOTE ADULT - SUBJECTIVE AND OBJECTIVE BOX
incomplete note    HOSPITALIST PROGRESS NOTE    SUBJECTIVE / INTERVAL HPI: Patient seen and examined at bedside.     ROS: All 10 systems reviewed and found to be negative with the exception of what has been described above.     VITAL SIGNS:  Vital Signs Last 24 Hrs  T(C): 36.8 (03 Jun 2024 08:57), Max: 36.8 (02 Jun 2024 23:20)  T(F): 98.3 (03 Jun 2024 08:57), Max: 98.3 (03 Jun 2024 08:57)  HR: 61 (03 Jun 2024 08:57) (60 - 61)  BP: 122/59 (03 Jun 2024 08:57) (107/58 - 122/59)  BP(mean): 64 (02 Jun 2024 15:47) (64 - 64)  RR: 19 (03 Jun 2024 08:57) (19 - 19)  SpO2: 97% (03 Jun 2024 08:57) (93% - 100%)    Parameters below as of 03 Jun 2024 08:57  Patient On (Oxygen Delivery Method): room air      PHYSICAL EXAM:  General: No acute distress  HEENT: NC/AT; PERRL, anicteric sclera; MMM  Neck: Supple  Cardiovascular: +S1/S2, RRR, no murmurs, rubs, gallops  Respiratory: CTA B/L; no W/R/R  Gastrointestinal: soft, NT/ND; +BSx4  Extremities: WWP; no edema, clubbing or cyanosis  Vascular: 2+ radial, DP/PT pulses B/L  Neurological: AAOx3; no focal deficits    MEDICATIONS:  MEDICATIONS  (STANDING):  allopurinol 100 milliGRAM(s) Oral two times a day  atorvastatin 10 milliGRAM(s) Oral at bedtime  folic acid 1 milliGRAM(s) Oral daily  furosemide    Tablet 20 milliGRAM(s) Oral daily  heparin  Infusion.  Unit(s)/Hr (10 mL/Hr) IV Continuous <Continuous>  metoprolol succinate ER 50 milliGRAM(s) Oral daily  montelukast 10 milliGRAM(s) Oral at bedtime  multivitamin 1 Tablet(s) Oral daily  potassium chloride    Tablet ER 10 milliEquivalent(s) Oral daily  sodium chloride 0.9% lock flush 3 milliLiter(s) IV Push every 8 hours    MEDICATIONS  (PRN):  heparin   Injectable 4000 Unit(s) IV Push every 6 hours PRN For aPTT less than 40  heparin   Injectable 2000 Unit(s) IV Push every 6 hours PRN For aPTT between 40 - 57      ALLERGIES:  Allergies    penicillin (Rash)  Eliquis (Unknown)    Intolerances        LABS:                        9.4    10.86 )-----------( 467      ( 03 Jun 2024 02:32 )             30.7     06-03    139  |  109<H>  |  33<H>  ----------------------------<  79  3.8   |  24  |  1.19    Ca    8.7      03 Jun 2024 02:32  Phos  2.9     06-03  Mg     2.5     06-03    TPro  6.3  /  Alb  2.5<L>  /  TBili  0.5  /  DBili  x   /  AST  31  /  ALT  29  /  AlkPhos  213<H>  06-02    PTT - ( 03 Jun 2024 02:32 )  PTT:128.9 sec  Urinalysis Basic - ( 03 Jun 2024 02:32 )    Color: x / Appearance: x / SG: x / pH: x  Gluc: 79 mg/dL / Ketone: x  / Bili: x / Urobili: x   Blood: x / Protein: x / Nitrite: x   Leuk Esterase: x / RBC: x / WBC x   Sq Epi: x / Non Sq Epi: x / Bacteria: x      CAPILLARY BLOOD GLUCOSE            RADIOLOGY & ADDITIONAL TESTS: Reviewed. HOSPITALIST PROGRESS NOTE    SUBJECTIVE / INTERVAL HPI: Patient seen and examined at bedside. No new complaints. Denies SOB. Daughter at bedside shared urine culture results from 1 week ago - culture growing Enterococcus Faecalis. Patient without urinary sx.     ROS: All 10 systems reviewed and found to be negative with the exception of what has been described above.     VITAL SIGNS:  Vital Signs Last 24 Hrs  T(C): 36.8 (03 Jun 2024 08:57), Max: 36.8 (02 Jun 2024 23:20)  T(F): 98.3 (03 Jun 2024 08:57), Max: 98.3 (03 Jun 2024 08:57)  HR: 61 (03 Jun 2024 08:57) (60 - 61)  BP: 122/59 (03 Jun 2024 08:57) (107/58 - 122/59)  BP(mean): 64 (02 Jun 2024 15:47) (64 - 64)  RR: 19 (03 Jun 2024 08:57) (19 - 19)  SpO2: 97% (03 Jun 2024 08:57) (93% - 100%)    Parameters below as of 03 Jun 2024 08:57  Patient On (Oxygen Delivery Method): room air    PHYSICAL EXAM:  General: No acute distress  HEENT: NC/AT; PERRL, anicteric sclera; MMM  Neck: Supple  Cardiovascular: +S1/S2, RRR, no murmurs, rubs, gallops  Respiratory: CTA B/L; no W/R/R  Gastrointestinal: soft, NT/ND; +BSx4  Extremities: WWP; trace bilateral ankle edema  Vascular: 2+ radial, DP/PT pulses B/L  Neurological: AAOx3; no focal deficits    MEDICATIONS:  MEDICATIONS  (STANDING):  allopurinol 100 milliGRAM(s) Oral two times a day  atorvastatin 10 milliGRAM(s) Oral at bedtime  folic acid 1 milliGRAM(s) Oral daily  furosemide    Tablet 20 milliGRAM(s) Oral daily  heparin  Infusion.  Unit(s)/Hr (10 mL/Hr) IV Continuous <Continuous>  metoprolol succinate ER 50 milliGRAM(s) Oral daily  montelukast 10 milliGRAM(s) Oral at bedtime  multivitamin 1 Tablet(s) Oral daily  potassium chloride    Tablet ER 10 milliEquivalent(s) Oral daily  sodium chloride 0.9% lock flush 3 milliLiter(s) IV Push every 8 hours    MEDICATIONS  (PRN):  heparin   Injectable 4000 Unit(s) IV Push every 6 hours PRN For aPTT less than 40  heparin   Injectable 2000 Unit(s) IV Push every 6 hours PRN For aPTT between 40 - 57      ALLERGIES:  Allergies    penicillin (Rash)  Eliquis (Unknown)    Intolerances        LABS:                        9.4    10.86 )-----------( 467      ( 03 Jun 2024 02:32 )             30.7     06-03    139  |  109<H>  |  33<H>  ----------------------------<  79  3.8   |  24  |  1.19    Ca    8.7      03 Jun 2024 02:32  Phos  2.9     06-03  Mg     2.5     06-03    TPro  6.3  /  Alb  2.5<L>  /  TBili  0.5  /  DBili  x   /  AST  31  /  ALT  29  /  AlkPhos  213<H>  06-02    PTT - ( 03 Jun 2024 02:32 )  PTT:128.9 sec  Urinalysis Basic - ( 03 Jun 2024 02:32 )    Color: x / Appearance: x / SG: x / pH: x  Gluc: 79 mg/dL / Ketone: x  / Bili: x / Urobili: x   Blood: x / Protein: x / Nitrite: x   Leuk Esterase: x / RBC: x / WBC x   Sq Epi: x / Non Sq Epi: x / Bacteria: x      CAPILLARY BLOOD GLUCOSE            RADIOLOGY & ADDITIONAL TESTS: Reviewed.

## 2024-06-03 NOTE — PROGRESS NOTE ADULT - SUBJECTIVE AND OBJECTIVE BOX
The patient was seen and examined. No acute events overnight. No CP or SOB. No palpitations.    Initial Consult HPI    HPI:  89-year-old female with PMH of HFpEF, CAD, Afib (on Warfarin and recently switched to DOAC), SSS s/p PPM, DLD, HTN presents to the ED with a diagnosis of PE. Patient was seen at her cardiologist's office for LE swelling found to have B/L DVT while on Warfarin, so was started on Xarelto and a CTPE was ordered. After finding of L main PE with concerns for RV strain patient's cardiologist advised her to come to the ED. On arrival patient denied acute complaints.   6/1  Mrs Frazier  was seen and examined by me this morning.    She denies any chest pain or pressure.    She denies any shortness of breath.    She states that recently she had poor p.o. intake secondary to her dentition.    She admits weight loss.    Admits bilateral lower extremity swelling  6/2- Mrs Frazier Was seen and examined by me this morning.    Overnight issues   She was started on heparin as today.        PAST MEDICAL & SURGICAL HISTORY:  HTN (hypertension)      Afib      Sleep apnea      Hyperlipidemia      Pacemaker      SSS (sick sinus syndrome)      Carotid artery disease      Chronic CHF      Pacemaker          Home Medications:  allopurinol 100 mg oral tablet: 1 tab(s) orally 2 times a day (31 May 2024 15:16)  ammonium lactate 12% topical lotion: Apply topically to affected area once a day (31 May 2024 15:16)  folic acid 1 mg oral tablet: 1 tab(s) orally once a day (31 May 2024 15:16)  furosemide 20 mg oral tablet: 1 tab(s) orally once a day (31 May 2024 15:59)  Metoprolol Succinate ER 50 mg oral tablet, extended release: 1 tab(s) orally once a day (31 May 2024 15:16)  montelukast 10 mg oral tablet: 1 tab(s) orally once a day (31 May 2024 15:16)  Multiple Vitamins oral tablet: 1 tab(s) orally once a day (31 May 2024 15:16)  potassium chloride 10 mEq oral tablet, extended release: 1 tab(s) orally once a day (31 May 2024 15:16)  pravastatin 20 mg oral tablet: 1 tab(s) orally once a day (in the evening) (31 May 2024 15:16)  Silvadene 1% topical cream: Apply topically to affected area once a day as needed for  wound care (31 May 2024 16:01)  Tylenol 500 mg oral tablet: 2 tab(s) orally every 8 hours as needed for (31 May 2024 15:16)  Xarelto: **** patient started xarelto sample 5/30/2024 for blood clot. Daughter can&#x27;t recall the mg*** (31 May 2024 15:56)  ZyrTEC 10 mg oral tablet: 1 tab(s) orally once a day (31 May 2024 15:16)    MEDICATIONS  (STANDING):  allopurinol 100 milliGRAM(s) Oral two times a day  atorvastatin 10 milliGRAM(s) Oral at bedtime  folic acid 1 milliGRAM(s) Oral daily  furosemide    Tablet 20 milliGRAM(s) Oral daily  heparin  Infusion.  Unit(s)/Hr (10 mL/Hr) IV Continuous <Continuous>  metoprolol succinate ER 50 milliGRAM(s) Oral daily  montelukast 10 milliGRAM(s) Oral at bedtime  multivitamin 1 Tablet(s) Oral daily  potassium chloride    Tablet ER 10 milliEquivalent(s) Oral daily  sodium chloride 0.9% lock flush 3 milliLiter(s) IV Push every 8 hours    MEDICATIONS  (PRN):  heparin   Injectable 4000 Unit(s) IV Push every 6 hours PRN For aPTT less than 40  heparin   Injectable 2000 Unit(s) IV Push every 6 hours PRN For aPTT between 40 - 57      Vital Signs Last 24 Hrs  T(C): 37.1 (03 Jun 2024 15:43), Max: 37.1 (03 Jun 2024 15:43)  T(F): 98.8 (03 Jun 2024 15:43), Max: 98.8 (03 Jun 2024 15:43)  HR: 81 (03 Jun 2024 15:43) (60 - 81)  BP: 109/64 (03 Jun 2024 15:43) (107/58 - 122/59)  BP(mean): --  RR: 19 (03 Jun 2024 15:43) (19 - 19)  SpO2: 94% (03 Jun 2024 15:43) (93% - 97%)    Parameters below as of 03 Jun 2024 15:43  Patient On (Oxygen Delivery Method): room air      I&O's Summary    02 Jun 2024 07:01  -  03 Jun 2024 07:00  --------------------------------------------------------  IN: 482 mL / OUT: 275 mL / NET: 207 mL    03 Jun 2024 07:01  -  03 Jun 2024 21:41  --------------------------------------------------------  IN: 55 mL / OUT: 0 mL / NET: 55 mL      PHYSICAL EXAM-    Constitutional:  no acute distress , elderly frail female    Head: Head is normocephalic and atraumatic.      Neck:  No JVD.     Cardiovascular: Regular rate and rhythm without S3, S4. No murmurs or rubs are appreciated.      Respiratory: Breathsounds are normal. No rales. No wheezing.    Abdomen: Soft, nontender, nondistended with positive bowel sounds.      Extremity: No tenderness.  3+ bilateral pedal and calf  pitting edema     Neurologic: The patient is alert and oriented.      Skin: No rash, no obvious lesions noted.      Psychiatric: The patient appears to be emotionally stable.      INTERPRETATION OF TELEMETRY: V paced, atrial fibrillation    ECG:  no EKG in the chart or EMR, ordered one    LABS:                        9.4    10.86 )-----------( 467      ( 03 Jun 2024 02:32 )             30.7          06-03    139  |  109<H>  |  33<H>  ----------------------------<  79  3.8   |  24  |  1.19    Ca    8.7      03 Jun 2024 02:32  Phos  2.9     06-03  Mg     2.5     06-03    TPro  6.3  /  Alb  2.5<L>  /  TBili  0.5  /  DBili  x   /  AST  31  /  ALT  29  /  AlkPhos  213<H>  06-02    PT/INR - ( 03 Jun 2024 10:56 )   PT: 13.9 sec;   INR: 1.24 ratio       PTT - ( 03 Jun 2024 20:19 )  PTT:63.9 sec  Urinalysis Basic - ( 03 Jun 2024 02:32 )    Color: x / Appearance: x / SG: x / pH: x  Gluc: 79 mg/dL / Ketone: x  / Bili: x / Urobili: x   Blood: x / Protein: x / Nitrite: x   Leuk Esterase: x / RBC: x / WBC x   Sq Epi: x / Non Sq Epi: x / Bacteria: x          LIVER FUNCTIONS - ( 01 Jun 2024 07:19 )  Alb: 2.4 g/dL / Pro: 5.8 gm/dL / ALK PHOS: 204 U/L / ALT: 33 U/L / AST: 29 U/L / GGT: x           PT/INR - ( 01 Jun 2024 07:19 )   PT: 25.9 sec;   INR: 2.35 ratio       PTT - ( 02 Jun 2024 03:08 )  PTT:> 200 sec     BNP  RADIOLOGY & ADDITIONAL STUDIES:  CT chest angio report in chart paper copy with MARISELA FARAH

## 2024-06-03 NOTE — PROGRESS NOTE ADULT - ASSESSMENT
89-year-old female with PMH of HFpEF, CAD, Afib (on Warfarin and recently switched to DOAC), SSS s/p PPM, DLD, HTN presents to the ED admitted with B/L DVT while on Warfarin, and L main PE with ? RV strain.    # Pulmonary embolism.   B/L DVT and L main PE diagnosed at outside facility. Echo here with ? evidence of RV strain, reviewed by Cardiology today and as per them no RV strain. Trop negative, BNP 4300. Intermediate risk PE.  INR 4.5 on admission (previously on Warfarin with labile INRs and recently switched to Xarelto), now on Heparin ggt.  Follow Cardiology recommendations. Hematology on board as well, states patient thrombophilic state is likely related to thrombocytosis (management below).    # Deep vein thrombosis (DVT):  As above.    # Chronic atrial fibrillation.   Continue BB (Toprol). Elevated [id number] on DOAC (Wafarin recently switched to Xarelto). Will anticoagulate with Heparin ggt as above.    # Chronic heart failure with preserved ejection fraction (HFpEF).   BNP elevated in the setting of PE with RV strain. No signs of ADHF. On Lasix 20 mg daily at home, continue.    # SSS (sick sinus syndrome).   SSS s/p PPM. V-paced on EKG. Continue to follow with Cardiology.    # Essential thrombocytosis:  Per Oncology, myeloproliferative neoplasm is likely culprit for patient's thromboembolic event. PLTs persistently in 500. Patient with poor nutritional status, iron deficiency suspected and may contribute to platelet disorders. Iron studies sent. Started BASA and continue Heparin per discussion with Hematology. Patient was previously on Hydroxyurea, will resume per Hem/Onc recs. Chronic AC alternatives (LMWH vs Warfarin vs DOAC) to be decided by Dr. Arenas on 6/3.    # Healthcare maintenance.   VTE prophylaxis: AC as above. 89-year-old female with PMH of HFpEF, CAD, Afib (on Warfarin and recently switched to DOAC), SSS s/p PPM, DLD, HTN presents to the ED admitted with B/L DVT while on Warfarin, and L main PE with ? RV strain.    #Pulmonary embolism.   B/L DVT and L main PE diagnosed at outside facility. Echo here with ? evidence of RV strain, reviewed by Cardiology today and as per them no RV strain. Trop negative, BNP 4300. Intermediate risk PE.  INR 4.5 on admission (previously on Warfarin with labile INRs and recently switched to Xarelto), now on Heparin gtt.  Follow Cardiology recommendations. Hematology on board as well.   Dr. Arneas to see patient today to determine anti-coagulation    #Deep vein thrombosis (DVT):  As above.    #Positive urine culture  Outpatient urine cultures positive for enterococcus faecalis Given pt is asymptomatic and not septic, will defer starting antibiotics and obtain repeat u/a with culture - discussed with ID Dr. Dasilva.     #Chronic atrial fibrillation.   Continue BB (Toprol). Elevated [id number] on DOAC (Wafarin recently switched to Xarelto). Will anticoagulate with Heparin gtt as above.    #Chronic heart failure with preserved ejection fraction (HFpEF).   BNP elevated in the setting of PE with RV strain. No signs of ADHF. On Lasix 20 mg daily at home, continue.    # SSS (sick sinus syndrome).   SSS s/p PPM. V-paced on EKG. Continue to follow with Cardiology.    #Essential thrombocytosis:  Per Oncology, myeloproliferative neoplasm is likely culprit for patient's thromboembolic event. PLTs persistently in 500. Patient with poor nutritional status, iron deficiency suspected and may contribute to platelet disorders. Iron studies sent. Started BASA and continue Heparin per discussion with Hematology. Patient was previously on Hydroxyurea, will resume per Hem/Onc recs. Chronic AC alternatives (LMWH vs Warfarin vs DOAC) to be decided by Dr. Arenas on 6/3.    #Healthcare maintenance.   VTE prophylaxis: AC as above.

## 2024-06-03 NOTE — PROGRESS NOTE ADULT - ASSESSMENT
PE and DVT  Thrombophilia  Atrial fibrillation on anticoagulation w/ warfarin (could not tolerate eliquis and declined xarelto in the past)  SSS s/p dual chamber PPM  Chronic heart failure with preserved ejection fraction   CAD    Plan  Could not tolerate Eliquis in the past and failed coumadin. d/w dtr, opened to trying xarelto.  Cont metoprol.   Cont lasix, no SOB.  Cont statin.

## 2024-06-03 NOTE — PHYSICAL THERAPY INITIAL EVALUATION ADULT - PERTINENT HX OF CURRENT PROBLEM, REHAB EVAL
Pt admitted to  secondary to bilateral DVT and PE. Pt seen in cardiologist's office for bilateral LE edema. Pt was found to have bilateral LE DVT and PE. Pt was sent to  ED. H/H- 90.4/30.7.

## 2024-06-03 NOTE — PHYSICAL THERAPY INITIAL EVALUATION ADULT - ACTIVE RANGE OF MOTION EXAMINATION, REHAB EVAL
Bilateral shoulder flex ~ 120 degrees./Left UE Active ROM was WFL (within functional limits)/Right UE Active ROM was WFL (within functional limits)/Left LE Active ROM was WFL (within functional limits)/Right LE Active ROM was WFL (within functional limits)

## 2024-06-03 NOTE — PHYSICAL THERAPY INITIAL EVALUATION ADULT - PLANNED THERAPY INTERVENTIONS, PT EVAL
Eval, amb, transfers x 15'. Pt left OOB in chair with all observation section equipment/material intact and pad alarm intact/activated. Pt with no c/o pain. Will cont to follow./bed mobility training/gait training/ROM/strengthening/transfer training

## 2024-06-04 ENCOUNTER — TRANSCRIPTION ENCOUNTER (OUTPATIENT)
Age: 89
End: 2024-06-04

## 2024-06-04 LAB
APPEARANCE UR: ABNORMAL
APTT BLD: 45.5 SEC — HIGH (ref 24.5–35.6)
BACTERIA # UR AUTO: ABNORMAL /HPF
BILIRUB UR-MCNC: NEGATIVE — SIGNIFICANT CHANGE UP
CAST: SIGNIFICANT CHANGE UP /LPF
COLOR SPEC: YELLOW — SIGNIFICANT CHANGE UP
DIFF PNL FLD: ABNORMAL
GLUCOSE UR QL: NEGATIVE MG/DL — SIGNIFICANT CHANGE UP
HCT VFR BLD CALC: 31.5 % — LOW (ref 34.5–45)
HGB BLD-MCNC: 9.6 G/DL — LOW (ref 11.5–15.5)
KETONES UR-MCNC: NEGATIVE MG/DL — SIGNIFICANT CHANGE UP
LEUKOCYTE ESTERASE UR-ACNC: ABNORMAL
MCHC RBC-ENTMCNC: 29.4 PG — SIGNIFICANT CHANGE UP (ref 27–34)
MCHC RBC-ENTMCNC: 30.5 GM/DL — LOW (ref 32–36)
MCV RBC AUTO: 96.6 FL — SIGNIFICANT CHANGE UP (ref 80–100)
NITRITE UR-MCNC: NEGATIVE — SIGNIFICANT CHANGE UP
PH UR: 5.5 — SIGNIFICANT CHANGE UP (ref 5–8)
PLATELET # BLD AUTO: 431 K/UL — HIGH (ref 150–400)
PROT UR-MCNC: NEGATIVE MG/DL — SIGNIFICANT CHANGE UP
RBC # BLD: 3.26 M/UL — LOW (ref 3.8–5.2)
RBC # FLD: 20.5 % — HIGH (ref 10.3–14.5)
RBC CASTS # UR COMP ASSIST: 18 /HPF — HIGH (ref 0–4)
SP GR SPEC: 1.01 — SIGNIFICANT CHANGE UP (ref 1–1.03)
SQUAMOUS # UR AUTO: 9 /HPF — HIGH (ref 0–5)
UROBILINOGEN FLD QL: 0.2 MG/DL — SIGNIFICANT CHANGE UP (ref 0.2–1)
WBC # BLD: 9.8 K/UL — SIGNIFICANT CHANGE UP (ref 3.8–10.5)
WBC # FLD AUTO: 9.8 K/UL — SIGNIFICANT CHANGE UP (ref 3.8–10.5)
WBC UR QL: 59 /HPF — HIGH (ref 0–5)

## 2024-06-04 PROCEDURE — 99233 SBSQ HOSP IP/OBS HIGH 50: CPT

## 2024-06-04 RX ORDER — RIVAROXABAN 15 MG-20MG
15 KIT ORAL
Refills: 0 | Status: DISCONTINUED | OUTPATIENT
Start: 2024-06-04 | End: 2024-06-06

## 2024-06-04 RX ORDER — VANCOMYCIN HCL 1 G
750 VIAL (EA) INTRAVENOUS EVERY 12 HOURS
Refills: 0 | Status: DISCONTINUED | OUTPATIENT
Start: 2024-06-04 | End: 2024-06-05

## 2024-06-04 RX ADMIN — Medication 250 MILLIGRAM(S): at 21:43

## 2024-06-04 RX ADMIN — Medication 100 MILLIGRAM(S): at 21:36

## 2024-06-04 RX ADMIN — SODIUM CHLORIDE 3 MILLILITER(S): 9 INJECTION INTRAMUSCULAR; INTRAVENOUS; SUBCUTANEOUS at 05:30

## 2024-06-04 RX ADMIN — Medication 1 MILLIGRAM(S): at 10:09

## 2024-06-04 RX ADMIN — MONTELUKAST 10 MILLIGRAM(S): 4 TABLET, CHEWABLE ORAL at 21:36

## 2024-06-04 RX ADMIN — Medication 20 MILLIGRAM(S): at 10:10

## 2024-06-04 RX ADMIN — HEPARIN SODIUM 500 UNIT(S)/HR: 5000 INJECTION INTRAVENOUS; SUBCUTANEOUS at 07:11

## 2024-06-04 RX ADMIN — Medication 10 MILLIEQUIVALENT(S): at 10:10

## 2024-06-04 RX ADMIN — RIVAROXABAN 15 MILLIGRAM(S): KIT at 10:53

## 2024-06-04 RX ADMIN — RIVAROXABAN 15 MILLIGRAM(S): KIT at 19:36

## 2024-06-04 RX ADMIN — SODIUM CHLORIDE 3 MILLILITER(S): 9 INJECTION INTRAMUSCULAR; INTRAVENOUS; SUBCUTANEOUS at 22:00

## 2024-06-04 RX ADMIN — Medication 100 MILLIGRAM(S): at 10:10

## 2024-06-04 RX ADMIN — Medication 1 TABLET(S): at 10:09

## 2024-06-04 RX ADMIN — Medication 250 MILLIGRAM(S): at 15:29

## 2024-06-04 RX ADMIN — ATORVASTATIN CALCIUM 10 MILLIGRAM(S): 80 TABLET, FILM COATED ORAL at 21:35

## 2024-06-04 RX ADMIN — Medication 50 MILLIGRAM(S): at 10:10

## 2024-06-04 NOTE — PROGRESS NOTE ADULT - ASSESSMENT
89-year-old female with PMH of HFpEF, CAD, Afib (on Warfarin and recently switched to DOAC), SSS s/p PPM, DLD, HTN presents to the ED admitted with B/L DVT while on Warfarin, and L main PE with ? RV strain.    #Pulmonary embolism.   B/L DVT and L main PE diagnosed at outside facility. Echo here with ? evidence of RV strain, reviewed by Cardiology today and as per them no RV strain. Trop negative, BNP 4300. Intermediate risk PE.  INR 4.5 on admission (previously on Warfarin with labile INRs and recently switched to Xarelto), now on Heparin gtt.  Follow Cardiology recommendations. Hematology on board as well.   Dr. Arenas to see patient today to determine anti-coagulation    #Deep vein thrombosis (DVT):  As above.    #Positive urine culture  Outpatient urine cultures positive for enterococcus faecalis Given pt is asymptomatic and not septic, will defer starting antibiotics and obtain repeat u/a with culture - discussed with ID Dr. Dasilva.     #Chronic atrial fibrillation.   Continue BB (Toprol). Elevated [id number] on DOAC (Wafarin recently switched to Xarelto). Will anticoagulate with Heparin gtt as above.    #Chronic heart failure with preserved ejection fraction (HFpEF).   BNP elevated in the setting of PE with RV strain. No signs of ADHF. On Lasix 20 mg daily at home, continue.    # SSS (sick sinus syndrome).   SSS s/p PPM. V-paced on EKG. Continue to follow with Cardiology.    #Essential thrombocytosis:  Per Oncology, myeloproliferative neoplasm is likely culprit for patient's thromboembolic event. PLTs persistently in 500. Patient with poor nutritional status, iron deficiency suspected and may contribute to platelet disorders. Iron studies sent. Started BASA and continue Heparin per discussion with Hematology. Patient was previously on Hydroxyurea, will resume per Hem/Onc recs. Chronic AC alternatives (LMWH vs Warfarin vs DOAC) to be decided by Dr. Arenas on 6/3.    #Severe protein calorie malnutrition    #Healthcare maintenance.   VTE prophylaxis: AC as above.   89-year-old female with PMH of HFpEF, CAD, Afib (on Warfarin and recently switched to DOAC), SSS s/p PPM, DLD, HTN presents to the ED admitted with B/L DVT while on Warfarin, and L main PE with ? RV strain.    #Pulmonary embolism.   B/L DVT and L main PE diagnosed at outside facility. Echo here with ? evidence of RV strain, reviewed by Cardiology today and as per them no RV strain. Trop negative, BNP 4300. Intermediate risk PE.  INR 4.5 on admission (previously on Warfarin with labile INRs and recently switched to Xarelto), transitioned to Heparin gtt.  Follow Cardiology recommendations. Hematology on board as well.   Dr. Arenas following  Transition from Heparin gtt to Xarelto this morning. Monitor     #Deep vein thrombosis (DVT):  As above.    #Positive urine culture  Outpatient urine cultures positive for enterococcus faecalis. Obtained repeat U/A and urine cultures  -U/A positive for WBC. Culture pending  -Based on outpatient sensitivities and pt's penicillin allergy, will start tx with Vancomycin. If repeat cultures negative, will discontinue. If positive, will tx with Vancomycin for 3 days. Discussed at length with Dr. Dasilva - recs appreciated    #Chronic atrial fibrillation.   Continue BB (Toprol). Elevated [id number] on DOAC (Wafarin recently switched to Xarelto). Will anticoagulate with Xarelto as above    #Chronic heart failure with preserved ejection fraction (HFpEF).   BNP elevated in the setting of PE with RV strain. No signs of ADHF. On Lasix 20 mg daily at home, continue.    # SSS (sick sinus syndrome).   SSS s/p PPM. V-paced on EKG. Continue to follow with Cardiology.    #Essential thrombocytosis:  Per Oncology, myeloproliferative neoplasm is likely culprit for patient's thromboembolic event. PLTs persistently in 500. Patient with poor nutritional status, iron deficiency suspected and may contribute to platelet disorders. Iron studies sent. Started BASA and continue Heparin per discussion with Hematology. Patient was previously on Hydroxyurea, will resume per Hem/Onc recs. Chronic AC alternatives (LMWH vs Warfarin vs DOAC) to be decided by Dr. Arenas on 6/3.    #Severe protein calorie malnutrition  Family requesting ensure clear + ensure high protein    #Healthcare maintenance.   VTE prophylaxis: AC as above.    DISPO: Pending repeat urine culture results  Daughter at bedside updated

## 2024-06-04 NOTE — H&P ADULT - NSHPPOAURINARYCATHETER_GEN_ALL_CORE
No
no
Xerosis Normal Treatment: I recommended application of Cetaphil or CeraVe numerous times a day and before going to bed to all dry areas.

## 2024-06-04 NOTE — DISCHARGE NOTE NURSING/CASE MANAGEMENT/SOCIAL WORK - PATIENT PORTAL LINK FT
You can access the FollowMyHealth Patient Portal offered by [facility] by registering at the following website: [email or web address]. By joining [facility]’s FollowMyHealth portal, you will also be able to view your health information using other applications (apps) compatible with our system.

## 2024-06-04 NOTE — PROGRESS NOTE ADULT - ASSESSMENT
PE and DVT  Thrombophilia  Atrial fibrillation on anticoagulation w/ warfarin (could not tolerate eliquis and declined xarelto in the past)  SSS s/p dual chamber PPM  Chronic heart failure with preserved ejection fraction   CAD    Plan    Start Xarelto and monitor for side effects.  Could not tolerate and close in the past.  Cont metoprol.   Cont lasix, no SOB.  Cont statin.

## 2024-06-04 NOTE — PROGRESS NOTE ADULT - SUBJECTIVE AND OBJECTIVE BOX
incomplete note    HOSPITALIST PROGRESS NOTE    SUBJECTIVE / INTERVAL HPI: Patient seen and examined at bedside.     ROS: All 10 systems reviewed and found to be negative with the exception of what has been described above.     VITAL SIGNS:  Vital Signs Last 24 Hrs  T(C): 36.4 (2024 07:35), Max: 36.6 (2024 23:26)  T(F): 97.5 (2024 07:35), Max: 97.8 (2024 23:26)  HR: 60 (2024 07:35) (60 - 63)  BP: 108/52 (2024 07:35) (108/52 - 111/55)  BP(mean): 68 (2024 07:35) (68 - 68)  RR: 18 (2024 07:35) (17 - 18)  SpO2: 95% (2024 07:35) (95% - 95%)    Parameters below as of 2024 07:35  Patient On (Oxygen Delivery Method): room air    PHYSICAL EXAM:  General: No acute distress  HEENT: NC/AT; PERRL, anicteric sclera; MMM  Neck: Supple  Cardiovascular: +S1/S2, RRR, no murmurs, rubs, gallops  Respiratory: CTA B/L; no W/R/R  Gastrointestinal: soft, NT/ND; +BSx4  Extremities: WWP; trace bilateral ankle edema  Vascular: 2+ radial, DP/PT pulses B/L  Neurological: AAOx3; no focal deficits    MEDICATIONS:  MEDICATIONS  (STANDING):  allopurinol 100 milliGRAM(s) Oral two times a day  atorvastatin 10 milliGRAM(s) Oral at bedtime  folic acid 1 milliGRAM(s) Oral daily  furosemide    Tablet 20 milliGRAM(s) Oral daily  metoprolol succinate ER 50 milliGRAM(s) Oral daily  montelukast 10 milliGRAM(s) Oral at bedtime  multivitamin 1 Tablet(s) Oral daily  potassium chloride    Tablet ER 10 milliEquivalent(s) Oral daily  rivaroxaban 15 milliGRAM(s) Oral two times a day with meals  sodium chloride 0.9% lock flush 3 milliLiter(s) IV Push every 8 hours  vancomycin  IVPB 750 milliGRAM(s) IV Intermittent every 12 hours    MEDICATIONS  (PRN):      ALLERGIES:  Allergies    penicillin (Rash)  Eliquis (Unknown)    Intolerances        LABS:                        9.6    9.80  )-----------( 431      ( 2024 07:57 )             31.5     06-03    139  |  109<H>  |  33<H>  ----------------------------<  79  3.8   |  24  |  1.19    Ca    8.7      2024 02:32  Phos  2.9     06-03  Mg     2.5     06-03      PT/INR - ( 2024 10:56 )   PT: 13.9 sec;   INR: 1.24 ratio         PTT - ( 2024 07:57 )  PTT:45.5 sec  Urinalysis Basic - ( 2024 05:45 )    Color: Yellow / Appearance: Cloudy / S.014 / pH: x  Gluc: x / Ketone: Negative mg/dL  / Bili: Negative / Urobili: 0.2 mg/dL   Blood: x / Protein: Negative mg/dL / Nitrite: Negative   Leuk Esterase: Large / RBC: 18 /HPF / WBC 59 /HPF   Sq Epi: x / Non Sq Epi: 9 /HPF / Bacteria: Moderate /HPF      CAPILLARY BLOOD GLUCOSE        Blood, Urine: Moderate ( @ 05:45)      RADIOLOGY & ADDITIONAL TESTS: Reviewed. HOSPITALIST PROGRESS NOTE    SUBJECTIVE / INTERVAL HPI: Patient seen and examined at bedside. Doing well no complaints. Denies urinary sx however repeat U/A still with WBC. Pending culture. Discussed with ID - will tx with Vancomycin until repeat cultures result. Wife at bedside updated.     ROS: All 10 systems reviewed and found to be negative with the exception of what has been described above.     VITAL SIGNS:  Vital Signs Last 24 Hrs  T(C): 36.4 (2024 07:35), Max: 36.6 (2024 23:26)  T(F): 97.5 (2024 07:35), Max: 97.8 (2024 23:26)  HR: 60 (2024 07:35) (60 - 63)  BP: 108/52 (2024 07:35) (108/52 - 111/55)  BP(mean): 68 (2024 07:35) (68 - 68)  RR: 18 (2024 07:35) (17 - 18)  SpO2: 95% (2024 07:35) (95% - 95%)    Parameters below as of 2024 07:35  Patient On (Oxygen Delivery Method): room air    PHYSICAL EXAM:  General: No acute distress  HEENT: NC/AT; PERRL, anicteric sclera; MMM  Neck: Supple  Cardiovascular: +S1/S2, RRR, no murmurs, rubs, gallops  Respiratory: CTA B/L; no W/R/R  Gastrointestinal: soft, NT/ND; +BSx4  Extremities: WWP; trace bilateral ankle edema  Vascular: 2+ radial, DP/PT pulses B/L  Neurological: AAOx3; no focal deficits    MEDICATIONS:  MEDICATIONS  (STANDING):  allopurinol 100 milliGRAM(s) Oral two times a day  atorvastatin 10 milliGRAM(s) Oral at bedtime  folic acid 1 milliGRAM(s) Oral daily  furosemide    Tablet 20 milliGRAM(s) Oral daily  metoprolol succinate ER 50 milliGRAM(s) Oral daily  montelukast 10 milliGRAM(s) Oral at bedtime  multivitamin 1 Tablet(s) Oral daily  potassium chloride    Tablet ER 10 milliEquivalent(s) Oral daily  rivaroxaban 15 milliGRAM(s) Oral two times a day with meals  sodium chloride 0.9% lock flush 3 milliLiter(s) IV Push every 8 hours  vancomycin  IVPB 750 milliGRAM(s) IV Intermittent every 12 hours    MEDICATIONS  (PRN):      ALLERGIES:  Allergies    penicillin (Rash)  Eliquis (Unknown)    Intolerances        LABS:                        9.6    9.80  )-----------( 431      ( 2024 07:57 )             31.5     06-03    139  |  109<H>  |  33<H>  ----------------------------<  79  3.8   |  24  |  1.19    Ca    8.7      2024 02:32  Phos  2.9     06-03  Mg     2.5     06-03      PT/INR - ( 2024 10:56 )   PT: 13.9 sec;   INR: 1.24 ratio         PTT - ( 2024 07:57 )  PTT:45.5 sec  Urinalysis Basic - ( 2024 05:45 )    Color: Yellow / Appearance: Cloudy / S.014 / pH: x  Gluc: x / Ketone: Negative mg/dL  / Bili: Negative / Urobili: 0.2 mg/dL   Blood: x / Protein: Negative mg/dL / Nitrite: Negative   Leuk Esterase: Large / RBC: 18 /HPF / WBC 59 /HPF   Sq Epi: x / Non Sq Epi: 9 /HPF / Bacteria: Moderate /HPF      CAPILLARY BLOOD GLUCOSE        Blood, Urine: Moderate ( @ 05:45)      RADIOLOGY & ADDITIONAL TESTS: Reviewed.

## 2024-06-04 NOTE — PROGRESS NOTE ADULT - SUBJECTIVE AND OBJECTIVE BOX
The patient was seen and examined. No acute events overnight. No CP or SOB. No palpitations.    Initial Consult HPI    HPI:  89-year-old female with PMH of HFpEF, CAD, Afib (on Warfarin and recently switched to DOAC), SSS s/p PPM, DLD, HTN presents to the ED with a diagnosis of PE. Patient was seen at her cardiologist's office for LE swelling found to have B/L DVT while on Warfarin, so was started on Xarelto and a CTPE was ordered. After finding of L main PE with concerns for RV strain patient's cardiologist advised her to come to the ED. On arrival patient denied acute complaints.     Mrs Frazier  was seen and examined by me this morning.    She denies any chest pain or pressure.    She denies any shortness of breath.    She states that recently she had poor p.o. intake secondary to her dentition.    She admits weight loss.    Admits bilateral lower extremity swelling  - Mrs Frazier Was seen and examined by me this morning.    Overnight issues   She was started on heparin as today.        PAST MEDICAL & SURGICAL HISTORY:  HTN (hypertension)    Afib    Sleep apnea    Hyperlipidemia    Pacemaker    SSS (sick sinus syndrome)    Carotid artery disease    Chronic CHF    Pacemaker          Home Medications:  allopurinol 100 mg oral tablet: 1 tab(s) orally 2 times a day (31 May 2024 15:16)  ammonium lactate 12% topical lotion: Apply topically to affected area once a day (31 May 2024 15:16)  folic acid 1 mg oral tablet: 1 tab(s) orally once a day (31 May 2024 15:16)  furosemide 20 mg oral tablet: 1 tab(s) orally once a day (31 May 2024 15:59)  Metoprolol Succinate ER 50 mg oral tablet, extended release: 1 tab(s) orally once a day (31 May 2024 15:16)  montelukast 10 mg oral tablet: 1 tab(s) orally once a day (31 May 2024 15:16)  Multiple Vitamins oral tablet: 1 tab(s) orally once a day (31 May 2024 15:16)  potassium chloride 10 mEq oral tablet, extended release: 1 tab(s) orally once a day (31 May 2024 15:16)  pravastatin 20 mg oral tablet: 1 tab(s) orally once a day (in the evening) (31 May 2024 15:16)  Silvadene 1% topical cream: Apply topically to affected area once a day as needed for  wound care (31 May 2024 16:01)  Tylenol 500 mg oral tablet: 2 tab(s) orally every 8 hours as needed for (31 May 2024 15:16)  Xarelto: **** patient started xarelto sample 2024 for blood clot. Daughter can&#x27;t recall the mg*** (31 May 2024 15:56)  ZyrTEC 10 mg oral tablet: 1 tab(s) orally once a day (31 May 2024 15:16)    MEDICATIONS  (STANDING):  allopurinol 100 milliGRAM(s) Oral two times a day  atorvastatin 10 milliGRAM(s) Oral at bedtime  folic acid 1 milliGRAM(s) Oral daily  furosemide    Tablet 20 milliGRAM(s) Oral daily  metoprolol succinate ER 50 milliGRAM(s) Oral daily  montelukast 10 milliGRAM(s) Oral at bedtime  multivitamin 1 Tablet(s) Oral daily  potassium chloride    Tablet ER 10 milliEquivalent(s) Oral daily  rivaroxaban 15 milliGRAM(s) Oral two times a day with meals  sodium chloride 0.9% lock flush 3 milliLiter(s) IV Push every 8 hours  vancomycin  IVPB 750 milliGRAM(s) IV Intermittent every 12 hours    MEDICATIONS  (PRN):      Vital Signs Last 24 Hrs  T(C): 36.8 (2024 16:20), Max: 36.8 (2024 16:20)  T(F): 98.3 (2024 16:20), Max: 98.3 (2024 16:20)  HR: 59 (2024 16:20) (59 - 63)  BP: 115/46 (2024 16:20) (108/52 - 115/46)  BP(mean): 68 (2024 07:35) (68 - 68)  RR: 18 (2024 16:20) (17 - 18)  SpO2: 98% (2024 16:20) (95% - 98%)    Parameters below as of 2024 16:20  Patient On (Oxygen Delivery Method): room air      I&O's Summary    2024 07:01  -  2024 07:00  --------------------------------------------------------  IN: 55 mL / OUT: 1 mL / NET: 54 mL          PHYSICAL EXAM-    Constitutional:  no acute distress , elderly frail female    Head: Head is normocephalic and atraumatic.      Neck:  No JVD.     Cardiovascular: Regular rate and rhythm without S3, S4. No murmurs or rubs are appreciated.      Respiratory: Breath sounds are normal. No rales. No wheezing.    Abdomen: Soft, nontender, nondistended with positive bowel sounds.      Extremity: No tenderness.  3+ bilateral pedal and calf  pitting edema     Neurologic: The patient is alert and oriented.      Skin: No rash, no obvious lesions noted.      Psychiatric: The patient appears to be emotionally stable.      INTERPRETATION OF TELEMETRY: V paced, atrial fibrillation    ECG: V paced at 61 BPM    LABS:                                        9.6    9.80  )-----------( 431      ( 2024 07:57 )             31.5          06-03    139  |  109<H>  |  33<H>  ----------------------------<  79  3.8   |  24  |  1.19    Ca    8.7      2024 02:32  Phos  2.9     06-03  Mg     2.5     06-03          PT/INR - ( 2024 10:56 )   PT: 13.9 sec;   INR: 1.24 ratio         PTT - ( 2024 07:57 )  PTT:45.5 sec  Urinalysis Basic - ( 2024 05:45 )    Color: Yellow / Appearance: Cloudy / S.014 / pH: x  Gluc: x / Ketone: Negative mg/dL  / Bili: Negative / Urobili: 0.2 mg/dL   Blood: x / Protein: Negative mg/dL / Nitrite: Negative   Leuk Esterase: Large / RBC: 18 /HPF / WBC 59 /HPF   Sq Epi: x / Non Sq Epi: 9 /HPF / Bacteria: Moderate /HPF        LIVER FUNCTIONS - ( 2024 07:19 )  Alb: 2.4 g/dL / Pro: 5.8 gm/dL / ALK PHOS: 204 U/L / ALT: 33 U/L / AST: 29 U/L / GGT: x           PT/INR - ( 2024 07:19 )   PT: 25.9 sec;   INR: 2.35 ratio       PTT - ( 2024 03:08 )  PTT:> 200 sec     BNP  RADIOLOGY & ADDITIONAL STUDIES:  CT chest angio report in chart paper copy with L PE

## 2024-06-05 LAB
ANION GAP SERPL CALC-SCNC: 5 MMOL/L — SIGNIFICANT CHANGE UP (ref 5–17)
BUN SERPL-MCNC: 29 MG/DL — HIGH (ref 7–23)
CALCIUM SERPL-MCNC: 9 MG/DL — SIGNIFICANT CHANGE UP (ref 8.5–10.1)
CHLORIDE SERPL-SCNC: 112 MMOL/L — HIGH (ref 96–108)
CO2 SERPL-SCNC: 23 MMOL/L — SIGNIFICANT CHANGE UP (ref 22–31)
CREAT SERPL-MCNC: 1.04 MG/DL — SIGNIFICANT CHANGE UP (ref 0.5–1.3)
CULTURE RESULTS: SIGNIFICANT CHANGE UP
EGFR: 51 ML/MIN/1.73M2 — LOW
GLUCOSE SERPL-MCNC: 89 MG/DL — SIGNIFICANT CHANGE UP (ref 70–99)
HCT VFR BLD CALC: 33.1 % — LOW (ref 34.5–45)
HGB BLD-MCNC: 10.2 G/DL — LOW (ref 11.5–15.5)
MCHC RBC-ENTMCNC: 29.5 PG — SIGNIFICANT CHANGE UP (ref 27–34)
MCHC RBC-ENTMCNC: 30.8 GM/DL — LOW (ref 32–36)
MCV RBC AUTO: 95.7 FL — SIGNIFICANT CHANGE UP (ref 80–100)
PLATELET # BLD AUTO: 440 K/UL — HIGH (ref 150–400)
POTASSIUM SERPL-MCNC: 4.1 MMOL/L — SIGNIFICANT CHANGE UP (ref 3.5–5.3)
POTASSIUM SERPL-SCNC: 4.1 MMOL/L — SIGNIFICANT CHANGE UP (ref 3.5–5.3)
RBC # BLD: 3.46 M/UL — LOW (ref 3.8–5.2)
RBC # FLD: 20.5 % — HIGH (ref 10.3–14.5)
SODIUM SERPL-SCNC: 140 MMOL/L — SIGNIFICANT CHANGE UP (ref 135–145)
SPECIMEN SOURCE: SIGNIFICANT CHANGE UP
VANCOMYCIN TROUGH SERPL-MCNC: 24.4 UG/ML — HIGH (ref 10–20)
WBC # BLD: 10.2 K/UL — SIGNIFICANT CHANGE UP (ref 3.8–10.5)
WBC # FLD AUTO: 10.2 K/UL — SIGNIFICANT CHANGE UP (ref 3.8–10.5)

## 2024-06-05 PROCEDURE — 99232 SBSQ HOSP IP/OBS MODERATE 35: CPT

## 2024-06-05 RX ORDER — RIVAROXABAN 15 MG-20MG
15 KIT ORAL ONCE
Refills: 0 | Status: COMPLETED | OUTPATIENT
Start: 2024-06-05 | End: 2024-06-05

## 2024-06-05 RX ADMIN — SODIUM CHLORIDE 3 MILLILITER(S): 9 INJECTION INTRAMUSCULAR; INTRAVENOUS; SUBCUTANEOUS at 13:00

## 2024-06-05 RX ADMIN — SODIUM CHLORIDE 3 MILLILITER(S): 9 INJECTION INTRAMUSCULAR; INTRAVENOUS; SUBCUTANEOUS at 21:16

## 2024-06-05 RX ADMIN — Medication 20 MILLIGRAM(S): at 09:52

## 2024-06-05 RX ADMIN — Medication 100 MILLIGRAM(S): at 09:26

## 2024-06-05 RX ADMIN — RIVAROXABAN 15 MILLIGRAM(S): KIT at 21:15

## 2024-06-05 RX ADMIN — Medication 100 MILLIGRAM(S): at 21:15

## 2024-06-05 RX ADMIN — MONTELUKAST 10 MILLIGRAM(S): 4 TABLET, CHEWABLE ORAL at 21:16

## 2024-06-05 RX ADMIN — Medication 50 MILLIGRAM(S): at 09:53

## 2024-06-05 RX ADMIN — Medication 250 MILLIGRAM(S): at 09:57

## 2024-06-05 RX ADMIN — Medication 1 TABLET(S): at 09:26

## 2024-06-05 RX ADMIN — ATORVASTATIN CALCIUM 10 MILLIGRAM(S): 80 TABLET, FILM COATED ORAL at 21:15

## 2024-06-05 RX ADMIN — RIVAROXABAN 15 MILLIGRAM(S): KIT at 09:26

## 2024-06-05 RX ADMIN — Medication 1 MILLIGRAM(S): at 09:27

## 2024-06-05 RX ADMIN — Medication 10 MILLIEQUIVALENT(S): at 09:52

## 2024-06-05 RX ADMIN — SODIUM CHLORIDE 3 MILLILITER(S): 9 INJECTION INTRAMUSCULAR; INTRAVENOUS; SUBCUTANEOUS at 07:27

## 2024-06-05 NOTE — PROGRESS NOTE ADULT - SUBJECTIVE AND OBJECTIVE BOX
incomplete note    HOSPITALIST PROGRESS NOTE    SUBJECTIVE / INTERVAL HPI: Patient seen and examined at bedside.     ROS: All 10 systems reviewed and found to be negative with the exception of what has been described above.     VITAL SIGNS:  Vital Signs Last 24 Hrs  T(C): 36.5 (2024 08:28), Max: 37 (2024 23:31)  T(F): 97.7 (2024 08:28), Max: 98.6 (2024 23:31)  HR: 60 (:28) (59 - 60)  BP: 120/50 (2024 08:28) (113/51 - 120/50)  BP(mean): --  RR: 18 (:28) (18 - 18)  SpO2: 95% (:) (95% - 98%)    Parameters below as of 2024 08:28  Patient On (Oxygen Delivery Method): room air    PHYSICAL EXAM:  General: No acute distress  HEENT: NC/AT; PERRL, anicteric sclera; MMM  Neck: Supple  Cardiovascular: +S1/S2, RRR, no murmurs, rubs, gallops  Respiratory: CTA B/L; no W/R/R  Gastrointestinal: soft, NT/ND; +BSx4  Extremities: WWP; trace bilateral ankle edema  Vascular: 2+ radial, DP/PT pulses B/L  Neurological: AAOx3; no focal deficits    MEDICATIONS:  MEDICATIONS  (STANDING):  allopurinol 100 milliGRAM(s) Oral two times a day  atorvastatin 10 milliGRAM(s) Oral at bedtime  folic acid 1 milliGRAM(s) Oral daily  furosemide    Tablet 20 milliGRAM(s) Oral daily  metoprolol succinate ER 50 milliGRAM(s) Oral daily  montelukast 10 milliGRAM(s) Oral at bedtime  multivitamin 1 Tablet(s) Oral daily  potassium chloride    Tablet ER 10 milliEquivalent(s) Oral daily  rivaroxaban 15 milliGRAM(s) Oral two times a day with meals  sodium chloride 0.9% lock flush 3 milliLiter(s) IV Push every 8 hours  vancomycin  IVPB 750 milliGRAM(s) IV Intermittent every 12 hours    MEDICATIONS  (PRN):      ALLERGIES:  Allergies    penicillin (Rash)  Eliquis (Unknown)    Intolerances        LABS:                        9.6    9.80  )-----------( 431      ( 2024 07:57 )             31.5           PT/INR - ( 2024 10:56 )   PT: 13.9 sec;   INR: 1.24 ratio         PTT - ( 2024 07:57 )  PTT:45.5 sec  Urinalysis Basic - ( 2024 05:45 )    Color: Yellow / Appearance: Cloudy / S.014 / pH: x  Gluc: x / Ketone: Negative mg/dL  / Bili: Negative / Urobili: 0.2 mg/dL   Blood: x / Protein: Negative mg/dL / Nitrite: Negative   Leuk Esterase: Large / RBC: 18 /HPF / WBC 59 /HPF   Sq Epi: x / Non Sq Epi: 9 /HPF / Bacteria: Moderate /HPF      CAPILLARY BLOOD GLUCOSE            RADIOLOGY & ADDITIONAL TESTS: Reviewed. HOSPITALIST PROGRESS NOTE    SUBJECTIVE / INTERVAL HPI: Patient seen and examined at bedside. Doing well, no complaints. Awaiting urine cx.     ROS: All 10 systems reviewed and found to be negative with the exception of what has been described above.     VITAL SIGNS:  Vital Signs Last 24 Hrs  T(C): 36.5 (2024 08:28), Max: 37 (2024 23:31)  T(F): 97.7 (2024 08:28), Max: 98.6 (2024 23:31)  HR: 60 (2024 08:28) (59 - 60)  BP: 120/50 (2024 08:28) (113/51 - 120/50)  BP(mean): --  RR: 18 (:) (18 - 18)  SpO2: 95% (:) (95% - 98%)    Parameters below as of 2024 08:28  Patient On (Oxygen Delivery Method): room air    PHYSICAL EXAM:  General: No acute distress  HEENT: NC/AT; PERRL, anicteric sclera; MMM  Neck: Supple  Cardiovascular: +S1/S2, RRR, no murmurs, rubs, gallops  Respiratory: CTA B/L; no W/R/R  Gastrointestinal: soft, NT/ND; +BSx4  Extremities: WWP; trace bilateral ankle edema  Vascular: 2+ radial, DP/PT pulses B/L  Neurological: AAOx3; no focal deficits    MEDICATIONS:  MEDICATIONS  (STANDING):  allopurinol 100 milliGRAM(s) Oral two times a day  atorvastatin 10 milliGRAM(s) Oral at bedtime  folic acid 1 milliGRAM(s) Oral daily  furosemide    Tablet 20 milliGRAM(s) Oral daily  metoprolol succinate ER 50 milliGRAM(s) Oral daily  montelukast 10 milliGRAM(s) Oral at bedtime  multivitamin 1 Tablet(s) Oral daily  potassium chloride    Tablet ER 10 milliEquivalent(s) Oral daily  rivaroxaban 15 milliGRAM(s) Oral two times a day with meals  sodium chloride 0.9% lock flush 3 milliLiter(s) IV Push every 8 hours  vancomycin  IVPB 750 milliGRAM(s) IV Intermittent every 12 hours    MEDICATIONS  (PRN):      ALLERGIES:  Allergies    penicillin (Rash)  Eliquis (Unknown)    Intolerances        LABS:                        9.6    9.80  )-----------( 431      ( 2024 07:57 )             31.5           PT/INR - ( 2024 10:56 )   PT: 13.9 sec;   INR: 1.24 ratio         PTT - ( 2024 07:57 )  PTT:45.5 sec  Urinalysis Basic - ( 2024 05:45 )    Color: Yellow / Appearance: Cloudy / S.014 / pH: x  Gluc: x / Ketone: Negative mg/dL  / Bili: Negative / Urobili: 0.2 mg/dL   Blood: x / Protein: Negative mg/dL / Nitrite: Negative   Leuk Esterase: Large / RBC: 18 /HPF / WBC 59 /HPF   Sq Epi: x / Non Sq Epi: 9 /HPF / Bacteria: Moderate /HPF      CAPILLARY BLOOD GLUCOSE            RADIOLOGY & ADDITIONAL TESTS: Reviewed.

## 2024-06-05 NOTE — PROGRESS NOTE ADULT - ASSESSMENT
PE and DVT  Thrombophilia  Atrial fibrillation on anticoagulation w/ warfarin (could not tolerate eliquis and declined xarelto in the past)  SSS s/p dual chamber PPM  Chronic heart failure with preserved ejection fraction   CAD    Plan    Continue Xarelto for anticoagulation.  Continue beta-blocker.  Continue statin.  Antibiotics per primary team.  Will follow as needed.

## 2024-06-05 NOTE — PROGRESS NOTE ADULT - ASSESSMENT
89-year-old female with PMH of HFpEF, CAD, Afib (on Warfarin and recently switched to DOAC), SSS s/p PPM, DLD, HTN presents to the ED admitted with B/L DVT while on Warfarin, and L main PE with ? RV strain.    #Pulmonary embolism.   B/L DVT and L main PE diagnosed at outside facility. Echo here with ? evidence of RV strain, reviewed by Cardiology today and as per them no RV strain. Trop negative, BNP 4300. Intermediate risk PE.  INR 4.5 on admission (previously on Warfarin with labile INRs and recently switched to Xarelto), transitioned to Heparin gtt.  Follow Cardiology recommendations. Hematology on board as well.   Dr. Arenas following  Transition from Heparin gtt to Xarelto this morning. Monitor     #Deep vein thrombosis (DVT):  As above.    #Positive urine culture  Outpatient urine cultures positive for enterococcus faecalis. Obtained repeat U/A and urine cultures  -U/A positive for WBC. Culture pending  -Based on outpatient sensitivities and pt's penicillin allergy, will start tx with Vancomycin. If repeat cultures negative, will discontinue. If positive, will tx with Vancomycin for 3 days. Discussed at length with Dr. Dasilva - recs appreciated    #Chronic atrial fibrillation.   Continue BB (Toprol). Elevated [id number] on DOAC (Wafarin recently switched to Xarelto). Will anticoagulate with Xarelto as above    #Chronic heart failure with preserved ejection fraction (HFpEF).   BNP elevated in the setting of PE with RV strain. No signs of ADHF. On Lasix 20 mg daily at home, continue.    # SSS (sick sinus syndrome).   SSS s/p PPM. V-paced on EKG. Continue to follow with Cardiology.    #Essential thrombocytosis:  Per Oncology, myeloproliferative neoplasm is likely culprit for patient's thromboembolic event. PLTs persistently in 500. Patient with poor nutritional status, iron deficiency suspected and may contribute to platelet disorders. Iron studies sent. Started BASA and continue Heparin per discussion with Hematology. Patient was previously on Hydroxyurea, will resume per Hem/Onc recs. Chronic AC alternatives (LMWH vs Warfarin vs DOAC) to be decided by Dr. Arenas on 6/3.    #Severe protein calorie malnutrition  Family requesting ensure clear + ensure high protein    #Healthcare maintenance.   VTE prophylaxis: AC as above.    DISPO: Pending repeat urine culture results  Daughter at bedside updated   89-year-old female with PMH of HFpEF, CAD, Afib (on Warfarin and recently switched to DOAC), SSS s/p PPM, DLD, HTN presents to the ED admitted with B/L DVT while on Warfarin, and L main PE with ? RV strain.    #Pulmonary embolism.   B/L DVT and L main PE diagnosed at outside facility. Echo here with ? evidence of RV strain, reviewed by Cardiology today and as per them no RV strain. Trop negative, BNP 4300. Intermediate risk PE.  INR 4.5 on admission (previously on Warfarin with labile INRs and recently switched to Xarelto), transitioned to Heparin gtt.  Follow Cardiology recommendations. Hematology on board as well.   Dr. Arenas following  Transition from Heparin gtt to Xarelto. Tolerating well    #Deep vein thrombosis (DVT):  As above.    #Positive urine culture  Outpatient urine cultures positive for enterococcus faecalis. Obtained repeat U/A and urine cultures  -U/A positive for WBC. Culture pending  -Based on outpatient sensitivities and pt's penicillin allergy, started abx with Vancomycin. If repeat cultures negative, will discontinue. If positive, will tx with Vancomycin for 3 days. Discussed at length with Dr. Dasilva - recs appreciated    #Chronic atrial fibrillation.   Continue BB (Toprol). Elevated [id number] on DOAC (Wafarin recently switched to Xarelto). Will anticoagulate with Xarelto as above    #Chronic heart failure with preserved ejection fraction (HFpEF).   BNP elevated in the setting of PE with RV strain. No signs of ADHF. On Lasix 20 mg daily at home, continue.    # SSS (sick sinus syndrome).   SSS s/p PPM. V-paced on EKG. Continue to follow with Cardiology.    #Essential thrombocytosis:  Per Oncology, myeloproliferative neoplasm is likely culprit for patient's thromboembolic event. PLTs persistently in 500. Patient with poor nutritional status, iron deficiency suspected and may contribute to platelet disorders. Iron studies sent. Started BASA and continue Heparin per discussion with Hematology. Patient was previously on Hydroxyurea, will resume per Hem/Onc recs. Chronic AC alternatives (LMWH vs Warfarin vs DOAC) to be decided by Dr. Arenas on 6/3.    #Severe protein calorie malnutrition  Family requesting ensure clear + ensure high protein    #Healthcare maintenance.   VTE prophylaxis: AC as above.    DISPO: Pending repeat urine culture results  Daughter at bedside updated

## 2024-06-05 NOTE — PROGRESS NOTE ADULT - NUTRITIONAL ASSESSMENT
This patient has been assessed with a concern for Malnutrition and has been determined to have a diagnosis/diagnoses of Severe protein-calorie malnutrition.    This patient is being managed with:   Diet DASH/TLC-  Sodium & Cholesterol Restricted  Entered: May 31 2024  2:33PM  
This patient has been assessed with a concern for Malnutrition and has been determined to have a diagnosis/diagnoses of Severe protein-calorie malnutrition.    This patient is being managed with:   Diet DASH/TLC-  Sodium & Cholesterol Restricted  Entered: May 31 2024  2:33PM  
This patient has been assessed with a concern for Malnutrition and has been determined to have a diagnosis/diagnoses of Severe protein-calorie malnutrition.    This patient is being managed with:   Diet Regular-  Supplement Feeding Modality:  Oral  Ensure Clear Cans or Servings Per Day:  1       Frequency:  Two Times a day  Ensure Plus High Protein Cans or Servings Per Day:  1       Frequency:  Daily  Entered: Jun 4 2024  6:06PM  
This patient has been assessed with a concern for Malnutrition and has been determined to have a diagnosis/diagnoses of Severe protein-calorie malnutrition.    This patient is being managed with:   Diet DASH/TLC-  Sodium & Cholesterol Restricted  Entered: May 31 2024  2:33PM  
No

## 2024-06-05 NOTE — PROGRESS NOTE ADULT - SUBJECTIVE AND OBJECTIVE BOX
The patient was seen and examined.  Tolerating Xarelto.  No chest discomfort.  No shortness of breath today.    Initial Consult HPI    HPI:  89-year-old female with PMH of HFpEF, CAD, Afib (on Warfarin and recently switched to DOAC), SSS s/p PPM, DLD, HTN presents to the ED with a diagnosis of PE. Patient was seen at her cardiologist's office for LE swelling found to have B/L DVT while on Warfarin, so was started on Xarelto and a CTPE was ordered. After finding of L main PE with concerns for RV strain patient's cardiologist advised her to come to the ED. On arrival patient denied acute complaints.     Mrs Frazier  was seen and examined by me this morning.    She denies any chest pain or pressure.    She denies any shortness of breath.    She states that recently she had poor p.o. intake secondary to her dentition.    She admits weight loss.    Admits bilateral lower extremity swelling  - Mrs Frazier Was seen and examined by me this morning.    Overnight issues   She was started on heparin as today.        PAST MEDICAL & SURGICAL HISTORY:  HTN (hypertension)    Afib    Sleep apnea    Hyperlipidemia    Pacemaker    SSS (sick sinus syndrome)    Carotid artery disease    Chronic CHF    Pacemaker          Home Medications:  allopurinol 100 mg oral tablet: 1 tab(s) orally 2 times a day (31 May 2024 15:16)  ammonium lactate 12% topical lotion: Apply topically to affected area once a day (31 May 2024 15:16)  folic acid 1 mg oral tablet: 1 tab(s) orally once a day (31 May 2024 15:16)  furosemide 20 mg oral tablet: 1 tab(s) orally once a day (31 May 2024 15:59)  Metoprolol Succinate ER 50 mg oral tablet, extended release: 1 tab(s) orally once a day (31 May 2024 15:16)  montelukast 10 mg oral tablet: 1 tab(s) orally once a day (31 May 2024 15:16)  Multiple Vitamins oral tablet: 1 tab(s) orally once a day (31 May 2024 15:16)  potassium chloride 10 mEq oral tablet, extended release: 1 tab(s) orally once a day (31 May 2024 15:16)  pravastatin 20 mg oral tablet: 1 tab(s) orally once a day (in the evening) (31 May 2024 15:16)  Silvadene 1% topical cream: Apply topically to affected area once a day as needed for  wound care (31 May 2024 16:01)  Tylenol 500 mg oral tablet: 2 tab(s) orally every 8 hours as needed for (31 May 2024 15:16)  Xarelto: **** patient started xarelto sample 2024 for blood clot. Daughter can&#x27;t recall the mg*** (31 May 2024 15:56)  ZyrTEC 10 mg oral tablet: 1 tab(s) orally once a day (31 May 2024 15:16)      MEDICATIONS  (STANDING):  allopurinol 100 milliGRAM(s) Oral two times a day  atorvastatin 10 milliGRAM(s) Oral at bedtime  folic acid 1 milliGRAM(s) Oral daily  furosemide    Tablet 20 milliGRAM(s) Oral daily  metoprolol succinate ER 50 milliGRAM(s) Oral daily  montelukast 10 milliGRAM(s) Oral at bedtime  multivitamin 1 Tablet(s) Oral daily  potassium chloride    Tablet ER 10 milliEquivalent(s) Oral daily  rivaroxaban 15 milliGRAM(s) Oral two times a day with meals  sodium chloride 0.9% lock flush 3 milliLiter(s) IV Push every 8 hours  vancomycin  IVPB 750 milliGRAM(s) IV Intermittent every 12 hours    MEDICATIONS  (PRN):      Vital Signs Last 24 Hrs  T(C): 36.5 (2024 08:28), Max: 37 (2024 23:31)  T(F): 97.7 (2024 08:28), Max: 98.6 (2024 23:31)  HR: 60 (2024 08:28) (59 - 60)  BP: 120/50 (2024 08:28) (113/51 - 120/50)  BP(mean): --  RR: 18 (2024 08:28) (18 - 18)  SpO2: 95% (2024 08:28) (95% - 98%)    Parameters below as of 2024 08:28  Patient On (Oxygen Delivery Method): room air      I&O's Summary      PHYSICAL EXAM-    Constitutional:  no acute distress , elderly frail female    Head: Head is normocephalic and atraumatic.      Neck:  No JVD.     Cardiovascular: Regular rate and rhythm without S3, S4. No murmurs or rubs are appreciated.      Respiratory: Breath sounds are normal. No rales. No wheezing.    Abdomen: Soft, nontender, nondistended with positive bowel sounds.      Extremity: No tenderness.  3+ bilateral pedal and calf  pitting edema     Neurologic: The patient is alert and oriented.      Skin: No rash, no obvious lesions noted.      Psychiatric: The patient appears to be emotionally stable.      INTERPRETATION OF TELEMETRY: V paced, atrial fibrillation    ECG: V paced at 61 BPM    LABS:                 Ca    8.7      2024 02:32  Phos  2.9     06-03  Mg     2.5     06-03          PT/INR - ( 2024 10:56 )   PT: 13.9 sec;   INR: 1.24 ratio         PTT - ( 2024 07:57 )  PTT:45.5 sec  Urinalysis Basic - ( 2024 05:45 )    Color: Yellow / Appearance: Cloudy / S.014 / pH: x  Gluc: x / Ketone: Negative mg/dL  / Bili: Negative / Urobili: 0.2 mg/dL   Blood: x / Protein: Negative mg/dL / Nitrite: Negative   Leuk Esterase: Large / RBC: 18 /HPF / WBC 59 /HPF   Sq Epi: x / Non Sq Epi: 9 /HPF / Bacteria: Moderate /HPF        LIVER FUNCTIONS - ( 2024 07:19 )  Alb: 2.4 g/dL / Pro: 5.8 gm/dL / ALK PHOS: 204 U/L / ALT: 33 U/L / AST: 29 U/L / GGT: x           PT/INR - ( 2024 07:19 )   PT: 25.9 sec;   INR: 2.35 ratio       PTT - ( 2024 03:08 )  PTT:> 200 sec     BNP  RADIOLOGY & ADDITIONAL STUDIES:  CT chest angio report in chart paper copy with MARISELA FARAH

## 2024-06-06 ENCOUNTER — TRANSCRIPTION ENCOUNTER (OUTPATIENT)
Age: 89
End: 2024-06-06

## 2024-06-06 VITALS
OXYGEN SATURATION: 97 % | SYSTOLIC BLOOD PRESSURE: 125 MMHG | TEMPERATURE: 98 F | HEART RATE: 61 BPM | DIASTOLIC BLOOD PRESSURE: 57 MMHG | RESPIRATION RATE: 18 BRPM

## 2024-06-06 LAB
HCT VFR BLD CALC: 32.4 % — LOW (ref 34.5–45)
HGB BLD-MCNC: 9.9 G/DL — LOW (ref 11.5–15.5)
MCHC RBC-ENTMCNC: 29.3 PG — SIGNIFICANT CHANGE UP (ref 27–34)
MCHC RBC-ENTMCNC: 30.6 GM/DL — LOW (ref 32–36)
MCV RBC AUTO: 95.9 FL — SIGNIFICANT CHANGE UP (ref 80–100)
PLATELET # BLD AUTO: 415 K/UL — HIGH (ref 150–400)
RBC # BLD: 3.38 M/UL — LOW (ref 3.8–5.2)
RBC # FLD: 20.6 % — HIGH (ref 10.3–14.5)
WBC # BLD: 9.78 K/UL — SIGNIFICANT CHANGE UP (ref 3.8–10.5)
WBC # FLD AUTO: 9.78 K/UL — SIGNIFICANT CHANGE UP (ref 3.8–10.5)

## 2024-06-06 PROCEDURE — 99239 HOSP IP/OBS DSCHRG MGMT >30: CPT

## 2024-06-06 RX ORDER — RIVAROXABAN 15 MG-20MG
1 KIT ORAL
Qty: 30 | Refills: 0
Start: 2024-06-06 | End: 2024-07-05

## 2024-06-06 RX ORDER — RIVAROXABAN 15 MG-20MG
0 KIT ORAL
Refills: 0 | DISCHARGE

## 2024-06-06 RX ORDER — RIVAROXABAN 15 MG-20MG
1 KIT ORAL
Qty: 36 | Refills: 0
Start: 2024-06-06 | End: 2024-06-23

## 2024-06-06 RX ADMIN — RIVAROXABAN 15 MILLIGRAM(S): KIT at 09:20

## 2024-06-06 RX ADMIN — Medication 50 MILLIGRAM(S): at 09:20

## 2024-06-06 RX ADMIN — Medication 1 MILLIGRAM(S): at 09:20

## 2024-06-06 RX ADMIN — Medication 20 MILLIGRAM(S): at 09:20

## 2024-06-06 RX ADMIN — Medication 100 MILLIGRAM(S): at 09:21

## 2024-06-06 RX ADMIN — SODIUM CHLORIDE 3 MILLILITER(S): 9 INJECTION INTRAMUSCULAR; INTRAVENOUS; SUBCUTANEOUS at 13:09

## 2024-06-06 RX ADMIN — Medication 1 TABLET(S): at 09:20

## 2024-06-06 RX ADMIN — Medication 10 MILLIEQUIVALENT(S): at 09:20

## 2024-06-06 RX ADMIN — SODIUM CHLORIDE 3 MILLILITER(S): 9 INJECTION INTRAMUSCULAR; INTRAVENOUS; SUBCUTANEOUS at 06:47

## 2024-06-06 NOTE — DISCHARGE NOTE PROVIDER - PROVIDER TOKENS
PROVIDER:[TOKEN:[[id number]],FOLLOWUP:[2 weeks],ESTABLISHEDPATIENT:[T]],PROVIDER:[TOKEN:[[id number]],FOLLOWUP:[2 weeks]]

## 2024-06-06 NOTE — PROGRESS NOTE ADULT - ASSESSMENT
DVTs, PE (new?)  while on warfarin  anemia  thrombocytosis    Multiple causes for thrombophilia:  JAK2 +   MPN  Rhematoid Arthritis  A Fib,  CAD, dydlipidemia,  Old frail, not very moblile  Recent infection w hospitalization, 12 days antibiotics    PLAN  now on xarelto and tolerating well  blood counts reviewed and stable today  likely for dc  f/u iron panel    f/u with Dr Arenas    Thank you for the courtesy of this consultation and we will continue to follow.    Jesse Woodard MD  [facility]  Cell: [phone]

## 2024-06-06 NOTE — DISCHARGE NOTE PROVIDER - CARE PROVIDERS DIRECT ADDRESSES
[email or web address]

## 2024-06-06 NOTE — DISCHARGE NOTE PROVIDER - NSDCFUSCHEDAPPT_GEN_ALL_CORE_FT
[facility] Physician [address]  Scheduled Appointment: 06/10/2024     [facility] Physician [address]  Scheduled Appointment: 08/05/2024

## 2024-06-06 NOTE — PROGRESS NOTE ADULT - SUBJECTIVE AND OBJECTIVE BOX
Pt seen, tolerating xarelto  no bleeding or bruising    MEDICATIONS  (STANDING):  allopurinol 100 milliGRAM(s) Oral two times a day  atorvastatin 10 milliGRAM(s) Oral at bedtime  folic acid 1 milliGRAM(s) Oral daily  furosemide    Tablet 20 milliGRAM(s) Oral daily  metoprolol succinate ER 50 milliGRAM(s) Oral daily  montelukast 10 milliGRAM(s) Oral at bedtime  multivitamin 1 Tablet(s) Oral daily  potassium chloride    Tablet ER 10 milliEquivalent(s) Oral daily  rivaroxaban 15 milliGRAM(s) Oral two times a day with meals  sodium chloride 0.9% lock flush 3 milliLiter(s) IV Push every 8 hours    MEDICATIONS  (PRN):      ROS  No fever, sweats, chills  No epistaxis, HA, sore throat  No CP, SOB, cough, sputum  No n/v/d, abd pain, melena, hematochezia  No edema  No rash  No anxiety  No back pain, joint pain  No bleeding, bruising  No dysuria, hematuria    Vital Signs Last 24 Hrs  T(C): 36.5 (06 Jun 2024 08:44), Max: 37.2 (05 Jun 2024 23:38)  T(F): 97.7 (06 Jun 2024 08:44), Max: 99 (05 Jun 2024 23:38)  HR: 61 (06 Jun 2024 08:44) (60 - 119)  BP: 125/57 (06 Jun 2024 08:44) (119/53 - 125/78)  BP(mean): --  RR: 18 (06 Jun 2024 08:44) (18 - 18)  SpO2: 97% (06 Jun 2024 08:44) (95% - 97%)    Parameters below as of 06 Jun 2024 08:44  Patient On (Oxygen Delivery Method): room air        PE  NAD  Awake, alert  Anicteric, MMM     No c/c/e  No rash grossly  FROM                          9.9    9.78  )-----------( 415      ( 06 Jun 2024 06:45 )             32.4       06-05    140  |  112<H>  |  29<H>  ----------------------------<  89  4.1   |  23  |  1.04    Ca    9.0      05 Jun 2024 08:28

## 2024-06-06 NOTE — DISCHARGE NOTE PROVIDER - DETAILS OF MALNUTRITION DIAGNOSIS/DIAGNOSES
This patient has been assessed with a concern for Malnutrition and was treated during this hospitalization for the following Nutrition diagnosis/diagnoses:     -  06/01/2024: Severe protein-calorie malnutrition

## 2024-06-06 NOTE — DISCHARGE NOTE PROVIDER - NSDCFUADDAPPT_GEN_ALL_CORE_FT
Follow-up appointment with Dr. Daugherty on June 13, 2024 at 10:00am    APPTS ARE READY TO BE MADE: [X] YES    Best Family or Patient Contact (if needed):    Additional Information about above appointments (if needed):    1: Hematology follow up within 2 weeks  2:  3:    Follow-up appointment with Dr. Daugherty on June 13, 2024 at 10:00am    APPTS ARE READY TO BE MADE: [X] YES    Best Family or Patient Contact (if needed):    Additional Information about above appointments (if needed):    1: Hematology follow up within 2 weeks  2:  3:     Patient informed us they already have secured all follow up appointments which is not visible on [facility] and declined to provide appointment details.

## 2024-06-06 NOTE — PROGRESS NOTE ADULT - PROVIDER SPECIALTY LIST ADULT
Cardiology
Heme/Onc
Hospitalist
Cardiology
Cardiology
Hospitalist
Cardiology
Heme/Onc

## 2024-06-06 NOTE — DISCHARGE NOTE PROVIDER - HOSPITAL COURSE
Admission HPI: 89-year-old female with PMH of HFpEF, CAD, Afib (on Warfarin and recently switched to DOAC), SSS s/p PPM, DLD, HTN presents to the ED with a diagnosis of PE. Patient was seen at her cardiologist's office for LE swelling found to have B/L DVT while on Warfarin, so was started on Xarelto and a CTPE was ordered. After finding of L main PE with concerns for RV strain patient's cardiologist advised her to come to the ED. On arrival patient denied acute complaints.     6/6: Patient seen and examined at bedside. No complaints - tolerating medications. Antibiotics discontinued and urine cx came back contaminated with >3 bugs. No signs of sepsis or urinary sx.     Hospital course (by problem):     #Pulmonary embolism.   B/L DVT and L main PE diagnosed at outside facility. Echo here with ? evidence of RV strain, reviewed by Cardiology today and as per them no RV strain. Trop negative, BNP 4300. Intermediate risk PE.  INR 4.5 on admission (previously on Warfarin with labile INRs and recently switched to Xarelto), transitioned to Heparin gtt.  Follow Cardiology recommendations. Hematology on board as well.   Dr. Arenas following  Transition from Heparin gtt to Xarelto. Tolerating well    #Deep vein thrombosis (DVT):  As above.    #Positive urine culture  Outpatient urine cultures positive for enterococcus faecalis. Obtained repeat U/A and urine cultures  -U/A positive for WBC. Culture pending  -Based on outpatient sensitivities and pt's penicillin allergy, started abx with Vancomycin. If repeat cultures negative, will discontinue. If positive, will tx with Vancomycin for 3 days. Discussed at length with Dr. Dasilva - recs appreciated    #Chronic atrial fibrillation.   Continue BB (Toprol). Elevated [id number] on DOAC ([place]arin recently switched to Xarelto). Will anticoagulate with Xarelto as above    #Chronic heart failure with preserved ejection fraction (HFpEF).   BNP elevated in the setting of PE with RV strain. No signs of ADHF. On Lasix 20 mg daily at home, continue.    # SSS (sick sinus syndrome).   SSS s/p PPM. V-paced on EKG. Continue to follow with Cardiology.    #Essential thrombocytosis:  Per Oncology, myeloproliferative neoplasm is likely culprit for patient's thromboembolic event. PLTs persistently in 500. Patient with poor nutritional status, iron deficiency suspected and may contribute to platelet disorders. Iron studies sent. Started BASA and continue Heparin per discussion with Hematology. Patient was previously on Hydroxyurea, will resume per Hem/Onc recs. Chronic AC alternatives (LMWH vs Warfarin vs DOAC) to be decided by Dr. Arenas on 6/3.    #Severe protein calorie malnutrition  Family requesting ensure clear + ensure high protein      Patient was discharged to: Home with home care    Physical exam at the time of discharge:  LOS: 6d    VITALS:   T(C): 37.2 (06-05-24 @ 23:38), Max: 37.2 (06-05-24 @ 23:38)  HR: 60 (06-06-24 @ 01:06) (60 - 119)  BP: 125/78 (06-05-24 @ 23:38) (119/53 - 125/78)  RR: 18 (06-05-24 @ 23:38) (18 - 18)  SpO2: 95% (06-05-24 @ 23:38) (95% - 95%)    PHYSICAL EXAM:  General: No acute distress  HEENT: NC/AT; PERRL, anicteric sclera; MMM  Neck: Supple  Cardiovascular: +S1/S2, RRR, no murmurs, rubs, gallops  Respiratory: CTA B/L; no W/R/R  Gastrointestinal: soft, NT/ND; +BSx4  Extremities: WWP; trace bilateral ankle edema  Vascular: 2+ radial, DP/PT pulses B/L  Neurological: AAOx3; no focal deficits       Admission HPI: 89-year-old female with PMH of HFpEF, CAD, Afib (on Warfarin and recently switched to DOAC), SSS s/p PPM, DLD, HTN presents to the ED with a diagnosis of PE. Patient was seen at her cardiologist's office for LE swelling found to have B/L DVT while on Warfarin, so was started on Xarelto and a CTPE was ordered. After finding of L main PE with concerns for RV strain patient's cardiologist advised her to come to the ED. On arrival patient denied acute complaints.     6/6: Patient seen and examined at bedside. No complaints - tolerating medications. Antibiotics discontinued and urine cx came back contaminated with >3 bugs. No signs of sepsis or urinary sx.   Plan discussed with daughter.     Hospital course (by problem):   #Pulmonary embolism.   B/L DVT and L main PE diagnosed at outside facility. Echo here with ? evidence of RV strain, reviewed by Cardiology today and as per them no RV strain. Trop negative, BNP 4300. Intermediate risk PE.  INR 4.5 on admission (previously on Warfarin with labile INRs and recently switched to Xarelto), transitioned to Heparin gtt.  Follow Cardiology recommendations. Hematology on board as well.   Dr. Arenas following  Transition from Heparin gtt to Xarelto. Tolerating well    #Deep vein thrombosis (DVT):  As above.    #Positive urine culture  Outpatient urine cultures positive for enterococcus faecalis. Obtained repeat U/A and urine cultures, however urine culture obtained in hospital came back contaminated. Will d/c Vancomycin at this point. No urinary sx and no signs of sepsis. Discussed with ID - Dr Dasilva recommendations appreciated.     #Chronic atrial fibrillation.   Continue BB (Toprol). Elevated [id number] on DOAC (Warfarin recently switched to Xarelto). Will anticoagulate with Xarelto as above    #Chronic heart failure with preserved ejection fraction (HFpEF).   BNP elevated in the setting of PE with RV strain. No signs of ADHF. On Lasix 20 mg daily at home, continue.    #SSS (sick sinus syndrome).   SSS s/p PPM. V-paced on EKG. Continue to follow with Cardiology.    #Essential thrombocytosis:  Per Oncology, myeloproliferative neoplasm is likely culprit for patient's thromboembolic event. PLTs persistently in 500. Patient with poor nutritional status, iron deficiency suspected and may contribute to platelet disorders. Iron studies sent. Started BASA and continue Heparin per discussion with Hematology. Patient was previously on Hydroxyurea, follow up with Heme/Onc outpatient to discuss     #Severe protein calorie malnutrition  Family requesting ensure clear + ensure high protein    Patient was discharged to: Home with home care    Physical exam at the time of discharge:  LOS: 6d    VITALS:   T(C): 37.2 (06-05-24 @ 23:38), Max: 37.2 (06-05-24 @ 23:38)  HR: 60 (06-06-24 @ 01:06) (60 - 119)  BP: 125/78 (06-05-24 @ 23:38) (119/53 - 125/78)  RR: 18 (06-05-24 @ 23:38) (18 - 18)  SpO2: 95% (06-05-24 @ 23:38) (95% - 95%)    PHYSICAL EXAM:  General: No acute distress  HEENT: NC/AT; PERRL, anicteric sclera; MMM  Neck: Supple  Cardiovascular: +S1/S2, RRR, no murmurs, rubs, gallops  Respiratory: CTA B/L; no W/R/R  Gastrointestinal: soft, NT/ND; +BSx4  Extremities: WWP; trace bilateral ankle edema  Vascular: 2+ radial, DP/PT pulses B/L  Neurological: AAOx3; no focal deficits       Admission HPI: 89-year-old female with PMH of HFpEF, CAD, Afib (on Warfarin and recently switched to DOAC), SSS s/p PPM, DLD, HTN presents to the ED with a diagnosis of PE. Patient was seen at her cardiologist's office for LE swelling found to have B/L DVT while on Warfarin, so was started on Xarelto and a CTPE was ordered. After finding of L main PE with concerns for RV strain patient's cardiologist advised her to come to the ED. On arrival patient denied acute complaints.     6/6: Patient seen and examined at bedside. No complaints - tolerating medications. Antibiotics discontinued and urine cx came back contaminated with >3 bugs. No signs of sepsis or urinary sx.   Plan discussed with daughter.     Hospital course (by problem):   #Pulmonary embolism.   B/L DVT and L main PE diagnosed at outside facility. Echo here with ? evidence of RV strain, reviewed by Cardiology today and as per them no RV strain. Trop negative, BNP 4300. Intermediate risk PE.  INR 4.5 on admission (previously on Warfarin with labile INRs and recently switched to Xarelto), transitioned to Heparin gtt.  Follow Cardiology recommendations. Hematology on board as well.   Dr. Arenas following  Transition from Heparin gtt to Xarelto. Tolerating well    #Deep vein thrombosis (DVT):  As above.    #Positive urine culture  Outpatient urine cultures positive for enterococcus faecalis. Obtained repeat U/A and urine cultures, however urine culture obtained in hospital came back contaminated. Will d/c Vancomycin at this point. No urinary sx and no signs of sepsis. Discussed with ID - Dr Dasilva recommendations appreciated.     #Chronic atrial fibrillation.   Continue BB (Toprol). Elevated [id number] on DOAC (Warfarin recently switched to Xarelto). Will anticoagulate with Xarelto as above    #Chronic heart failure with preserved ejection fraction (HFpEF).   BNP elevated in the setting of PE with RV strain. No signs of ADHF. On Lasix 20 mg daily at home, continue.    #SSS (sick sinus syndrome).   SSS s/p PPM. V-paced on EKG. Continue to follow with Cardiology.    #Essential thrombocytosis:  Per Oncology, myeloproliferative neoplasm is likely culprit for patient's thromboembolic event. PLTs persistently in 500. Patient with poor nutritional status, iron deficiency suspected and may contribute to platelet disorders. Iron studies sent. Started BASA and continue Heparin per discussion with Hematology. Patient was previously on Hydroxyurea, follow up with Heme/Onc outpatient to discuss     #Severe protein calorie malnutrition  Family requesting ensure clear + ensure high protein    Patient was discharged to: Home with home care    GOC: Reviewed previous MOLST with daughter/HCP. Patient is DNR/DNI with trial of NIV. Daughter has MOLST at home    Physical exam at the time of discharge:  LOS: 6d    VITALS:   T(C): 37.2 (06-05-24 @ 23:38), Max: 37.2 (06-05-24 @ 23:38)  HR: 60 (06-06-24 @ 01:06) (60 - 119)  BP: 125/78 (06-05-24 @ 23:38) (119/53 - 125/78)  RR: 18 (06-05-24 @ 23:38) (18 - 18)  SpO2: 95% (06-05-24 @ 23:38) (95% - 95%)    PHYSICAL EXAM:  General: No acute distress  HEENT: NC/AT; PERRL, anicteric sclera; MMM  Neck: Supple  Cardiovascular: +S1/S2, RRR, no murmurs, rubs, gallops  Respiratory: CTA B/L; no W/R/R  Gastrointestinal: soft, NT/ND; +BSx4  Extremities: WWP; trace bilateral ankle edema  Vascular: 2+ radial, DP/PT pulses B/L  Neurological: AAOx3; no focal deficits

## 2024-06-06 NOTE — DISCHARGE NOTE PROVIDER - NSDCCPCAREPLAN_GEN_ALL_CORE_FT
PRINCIPAL DISCHARGE DIAGNOSIS  Diagnosis: Pulmonary embolism  Assessment and Plan of Treatment: Pulmonary embolism (or "PE") is a blockage in one or more of the blood vessels that supply blood to the lungs. Most often these blockages are caused by blood clots that form elsewhere and then travel to the lungs.   Continue Xarelto as prescribed and follow up with Dr. Arenas outpatient for further management.     PRINCIPAL DISCHARGE DIAGNOSIS  Diagnosis: Pulmonary embolism  Assessment and Plan of Treatment: Pulmonary embolism (or "PE") is a blockage in one or more of the blood vessels that supply blood to the lungs. Most often these blockages are caused by blood clots that form elsewhere and then travel to the lungs.   Continue Xarelto as prescribed and follow up with Dr. Arenas outpatient for further management.  Medication instructions:  Xarelto 15 mg twice per day until 6/24/24  Starting 6/25/24, take Xarelto 20 mg once per day      SECONDARY DISCHARGE DIAGNOSES  Diagnosis: Chronic atrial fibrillation  Assessment and Plan of Treatment: Continue outpatient management with Dr. Daugherty.

## 2024-06-06 NOTE — DISCHARGE NOTE PROVIDER - NSDCCAREPROVSEEN_GEN_ALL_CORE_FT
Saundra, Janet Machado, Ciro Lincoln, Ranjith Daugherty, Gisselle Espinoza, Opal Dasilva, Kaushik Dominguez, Rosana Woodard, Deborah Yang

## 2024-06-06 NOTE — DISCHARGE NOTE PROVIDER - CARE PROVIDER_API CALL
KEIRY ALONSO  [address] [place] [address], [address]  [place] [address]  Phone: [phone]  Fax: [phone]  Established Patient  Follow Up Time: 2 weeks    Gisselle Daugherty  Cardiology  [address] [place] [address]  Phone: [phone]  Fax: [phone]  Follow Up Time: 2 weeks

## 2024-06-06 NOTE — DISCHARGE NOTE PROVIDER - NSDCMRMEDTOKEN_GEN_ALL_CORE_FT
allopurinol 100 mg oral tablet: 1 tab(s) orally 2 times a day  ammonium lactate 12% topical lotion: Apply topically to affected area once a day  folic acid 1 mg oral tablet: 1 tab(s) orally once a day  furosemide 20 mg oral tablet: 1 tab(s) orally once a day  Metoprolol Succinate ER 50 mg oral tablet, extended release: 1 tab(s) orally once a day  montelukast 10 mg oral tablet: 1 tab(s) orally once a day  Multiple Vitamins oral tablet: 1 tab(s) orally once a day  potassium chloride 10 mEq oral tablet, extended release: 1 tab(s) orally once a day  pravastatin 20 mg oral tablet: 1 tab(s) orally once a day (in the evening)  Silvadene 1% topical cream: Apply topically to affected area once a day as needed for  wound care  Tylenol 500 mg oral tablet: 2 tab(s) orally every 8 hours as needed for  Xarelto: **** patient started xarelto sample 5/30/2024 for blood clot. Daughter can&#x27;t recall the mg***  ZyrTEC 10 mg oral tablet: 1 tab(s) orally once a day   allopurinol 100 mg oral tablet: 1 tab(s) orally 2 times a day  ammonium lactate 12% topical lotion: Apply topically to affected area once a day  folic acid 1 mg oral tablet: 1 tab(s) orally once a day  furosemide 20 mg oral tablet: 1 tab(s) orally once a day  Metoprolol Succinate ER 50 mg oral tablet, extended release: 1 tab(s) orally once a day  montelukast 10 mg oral tablet: 1 tab(s) orally once a day  Multiple Vitamins oral tablet: 1 tab(s) orally once a day  potassium chloride 10 mEq oral tablet, extended release: 1 tab(s) orally once a day  pravastatin 20 mg oral tablet: 1 tab(s) orally once a day (in the evening)  Silvadene 1% topical cream: Apply topically to affected area once a day as needed for  wound care  Tylenol 500 mg oral tablet: 2 tab(s) orally every 8 hours as needed for  Xarelto 15 mg oral tablet: 1 tab(s) orally every 12 hours Last day on 6/24/24  Xarelto 20 mg oral tablet: 1 tab(s) orally once a day Start on 6/25/24  ZyrTEC 10 mg oral tablet: 1 tab(s) orally once a day

## 2024-06-16 ENCOUNTER — APPOINTMENT (OUTPATIENT)
Dept: ELECTROPHYSIOLOGY | Facility: CLINIC | Age: 89
End: 2024-06-16

## 2024-06-17 DIAGNOSIS — I82.413 ACUTE EMBOLISM AND THROMBOSIS OF FEMORAL VEIN, BILATERAL: ICD-10-CM

## 2024-06-17 DIAGNOSIS — G47.30 SLEEP APNEA, UNSPECIFIED: ICD-10-CM

## 2024-06-17 DIAGNOSIS — I49.5 SICK SINUS SYNDROME: ICD-10-CM

## 2024-06-17 DIAGNOSIS — K05.10 CHRONIC GINGIVITIS, PLAQUE INDUCED: ICD-10-CM

## 2024-06-17 DIAGNOSIS — I26.99 OTHER PULMONARY EMBOLISM WITHOUT ACUTE COR PULMONALE: ICD-10-CM

## 2024-06-17 DIAGNOSIS — D47.3 ESSENTIAL (HEMORRHAGIC) THROMBOCYTHEMIA: ICD-10-CM

## 2024-06-17 DIAGNOSIS — M06.00 RHEUMATOID ARTHRITIS WITHOUT RHEUMATOID FACTOR, UNSPECIFIED SITE: ICD-10-CM

## 2024-06-17 DIAGNOSIS — I50.32 CHRONIC DIASTOLIC (CONGESTIVE) HEART FAILURE: ICD-10-CM

## 2024-06-17 DIAGNOSIS — I11.0 HYPERTENSIVE HEART DISEASE WITH HEART FAILURE: ICD-10-CM

## 2024-06-17 DIAGNOSIS — M27.2 INFLAMMATORY CONDITIONS OF JAWS: ICD-10-CM

## 2024-06-17 DIAGNOSIS — I48.20 CHRONIC ATRIAL FIBRILLATION, UNSPECIFIED: ICD-10-CM

## 2024-06-17 DIAGNOSIS — Z95.0 PRESENCE OF CARDIAC PACEMAKER: ICD-10-CM

## 2024-06-17 DIAGNOSIS — Z79.01 LONG TERM (CURRENT) USE OF ANTICOAGULANTS: ICD-10-CM

## 2024-06-17 DIAGNOSIS — Z88.0 ALLERGY STATUS TO PENICILLIN: ICD-10-CM

## 2024-06-17 DIAGNOSIS — I25.10 ATHEROSCLEROTIC HEART DISEASE OF NATIVE CORONARY ARTERY WITHOUT ANGINA PECTORIS: ICD-10-CM

## 2024-06-17 DIAGNOSIS — D47.1 CHRONIC MYELOPROLIFERATIVE DISEASE: ICD-10-CM

## 2024-06-17 DIAGNOSIS — I82.4Z1 ACUTE EMBOLISM AND THROMBOSIS OF UNSPECIFIED DEEP VEINS OF RIGHT DISTAL LOWER EXTREMITY: ICD-10-CM

## 2024-06-17 DIAGNOSIS — E78.5 HYPERLIPIDEMIA, UNSPECIFIED: ICD-10-CM

## 2024-06-17 DIAGNOSIS — I82.431 ACUTE EMBOLISM AND THROMBOSIS OF RIGHT POPLITEAL VEIN: ICD-10-CM

## 2024-06-17 DIAGNOSIS — E43 UNSPECIFIED SEVERE PROTEIN-CALORIE MALNUTRITION: ICD-10-CM

## 2024-06-17 DIAGNOSIS — Z88.8 ALLERGY STATUS TO OTHER DRUGS, MEDICAMENTS AND BIOLOGICAL SUBSTANCES: ICD-10-CM

## 2024-07-08 NOTE — PATIENT PROFILE ADULT - IS PATIENT POST-MENOPAUSAL?
Preventive Care Visit  [facility]  Bertha Merida MD, Internal Medicine  Jul 8, 2024      Assessment & Plan     Jamila was seen today for physical.    Diagnoses and all orders for this visit:    Routine general medical examination at a health care facility  Preventive health counseling was also done.  Last mammogram on 03/19/2024  Last colonoscopy on 09/22/2022 - repeat in 10 years  Last Pap on 03/24/2023  She receives COVID-19 booster today   Advised patient to get shingrix vaccine at pharmacy.     Hyperlipidemia, unspecified hyperlipidemia type  -     Lipid panel reflex to direct LDL Non-fasting; Future    Recurrent postcoital urinary tract infection  -     nitroFURantoin macrocrystal (MACRODANTIN) 50 MG capsule; Take one tablet after intercourse to prevent UTI    Generalized anxiety disorder  On wellbutrin 150 mg daily and ativan as needed     IUD (intrauterine device) in place  Advised patient to contact her OB/GYN in [place] regarding IUD as she is not sure about the dates     Anxiety  -     buPROPion (WELLBUTRIN XL) 150 MG 24 hr tablet; Take 1 tablet (150 mg) by mouth every morning  Works well for her     Panic attacks  -     LORazepam (ATIVAN) 0.5 MG tablet; Take 1 tablet (0.5 mg) by mouth daily as needed for anxiety  She uses it sparingly   Patient is aware of risks and dependence of the medication. Patient understands not to take the medication with alcohol or while driving. We also require 6 month follow up and patient understands this.      Overweight (BMI 25.0-29.9)  -     metFORMIN (GLUCOPHAGE XR) 500 MG 24 hr tablet; Take 1 tablet (500 mg) by mouth daily (with dinner)  -     phentermine 30 MG capsule; Take 1 capsule (30 mg) by mouth every morning for weight loss  She has lost weight with the medications - today weight at 182 lb. She is also careful with her diet and does regular physical activities   We are using it for long term as it is helping her so much    Screening for  "thyroid disorder  -     TSH with free T4 reflex; Future    Medication management  -     Comprehensive metabolic panel; Future    Screening for deficiency anemia  -     CBC with platelets; Future    Need for hepatitis B screening test  -     Hepatitis B Surface Antibody; Future    Absence of menstruation  -     Follicle stimulating hormone; Future  Will do FSH to check for menopausal status.     Other orders  -     REVIEW OF HEALTH MAINTENANCE PROTOCOL ORDERS  -     PRIMARY CARE FOLLOW-UP SCHEDULING; Future      Patient has been advised of split billing requirements and indicates understanding: Yes        BMI  Estimated body mass index is 27.97 kg/m  as calculated from the following:    Height as of this encounter: 1.72 m (5' 7.72\").    Weight as of this encounter: 82.7 kg (182 lb 6.4 oz).   Weight management plan: Discussed healthy diet and exercise guidelines    Counseling  Appropriate preventive services were discussed with this patient, including applicable screening as appropriate for fall prevention, nutrition, physical activity, Tobacco-use cessation, weight loss and cognition.  Checklist reviewing preventive services available has been given to the patient.  Reviewed patient's diet, addressing concerns and/or questions.     Tania Marino is a 50 year old, presenting for the following:  Physical         No data to display                 Health Care Directive  Patient does not have a Health Care Directive or Living Will: Discussed advance care planning with patient; however, patient declined at this time.    JANAK Marino is a 50 year old, presenting for the following:  Physical        7/8/2024   General Health   How would you rate your overall physical health? Good   Feel stress (tense, anxious, or unable to sleep) Not at all            7/8/2024   Nutrition   Three or more servings of calcium each day? Yes   Diet: Regular (no restrictions)   How many servings of fruit and vegetables per day? (!) 2-3 "   How many sweetened beverages each day? 0-1            7/8/2024   Exercise   Days per week of moderate/strenous exercise 5 days   Average minutes spent exercising at this level 60 min            7/8/2024   Social Factors   Frequency of gathering with friends or relatives Once a week   Worry food won't last until get money to buy more No   Food not last or not have enough money for food? No   Do you have housing? (Housing is defined as stable permanent housing and does not include staying ouside in a car, in a tent, in an abandoned building, in an overnight shelter, or couch-surfing.) Yes   Are you worried about losing your housing? No   Lack of transportation? No   Unable to get utilities (heat,electricity)? No            7/8/2024   Fall Risk   Fallen 2 or more times in the past year? No   Trouble with walking or balance? No             7/8/2024   Dental   Dentist two times every year? Yes            7/8/2024   TB Screening   Were you born outside of the [place]? No          Today's PHQ-9 Score:       7/7/2024    11:32 AM   PHQ-9 SCORE   PHQ-9 Total Score MyChart 0   PHQ-9 Total Score 0         7/8/2024   Substance Use   Alcohol more than 3/day or more than 7/wk No   Do you use any other substances recreationally? No        Social History     Tobacco Use    Smoking status: Never    Smokeless tobacco: Never   Vaping Use    Vaping status: Never Used   Substance Use Topics    Alcohol use: Not Currently    Drug use: Never           3/19/2024   LAST FHS-7 RESULTS   1st degree relative breast or ovarian cancer No   Any relative bilateral breast cancer No   Any male have breast cancer No   Any ONE woman have BOTH breast AND ovarian cancer No   Any woman with breast cancer before 50yrs Yes   2 or more relatives with breast AND/OR ovarian cancer No   2 or more relatives with breast AND/OR bowel cancer No           Mammogram Screening - Mammogram every 1-2 years updated in Health Maintenance based on mutual decision making         "7/8/2024   STI Screening   New sexual partner(s) since last STI/HIV test? No        History of abnormal Pap smear: No - age 30- 64 PAP with HPV every 5 years recommended        Latest Ref Rng & Units 3/24/2023     9:05 AM   PAP / HPV   PAP  Negative for Intraepithelial Lesion or Malignancy (NILM)    HPV 16 DNA Negative Negative    HPV 18 DNA Negative Negative    Other HR HPV Negative Negative      ASCVD Risk   The 10-year ASCVD risk score (April SOLO, et al., 2019) is: 0.7%    Values used to calculate the score:      Age: 50 years      Sex: Female      Is Non- : No      Diabetic: No      Tobacco smoker: No      Systolic Blood Pressure: 109 mmHg      Is BP treated: No      HDL Cholesterol: 71 mg/dL      Total Cholesterol: 215 mg/dL        7/8/2024   Contraception/Family Planning   Questions about contraception or family planning No           Reviewed and updated as needed this visit by Provider                  Review of Systems  Constitutional, HEENT, cardiovascular, pulmonary, GI, , musculoskeletal, neuro, skin, endocrine and psych systems are negative, except as otherwise noted.     Objective    Exam  /68 (BP Location: Right arm, Patient Position: Sitting, Cuff Size: Adult Regular)   Pulse 85   Temp 98.4  F (36.9  C) (Oral)   Resp 16   Ht 1.72 m (5' 7.72\")   Wt 82.7 kg (182 lb 6.4 oz)   SpO2 96%   BMI 27.97 kg/m     Estimated body mass index is 27.97 kg/m  as calculated from the following:    Height as of this encounter: 1.72 m (5' 7.72\").    Weight as of this encounter: 82.7 kg (182 lb 6.4 oz).    Physical Exam  GENERAL: alert and no distress  EYES: Eyes grossly normal to inspection, PERRL and conjunctivae and sclerae normal  HENT: ear canals and TM's normal, nose and mouth without ulcers or lesions  NECK: no adenopathy, no asymmetry, masses, or scars  RESP: lungs clear to auscultation - no rales, rhonchi or wheezes  BREAST: normal without masses, tenderness or " nipple discharge and no palpable axillary masses or adenopathy  CV: regular rate and rhythm, normal S1 S2, no S3 or S4, no murmur, click or rub, no peripheral edema  ABDOMEN: soft, nontender, no hepatosplenomegaly, no masses and bowel sounds normal  MS: no gross musculoskeletal defects noted, no edema  SKIN: numerous moles throughout the body   NEURO: Normal strength and tone, mentation intact and speech normal  PSYCH: mentation appears normal, affect normal/bright      Signed Electronically by: Bertha Merida MD      This document serves as a record of the services and decisions personally performed and made by Dr. Merida. It was created on her behalf by Kameron Delcid, a trained medical scribe. The creation of this document is based the provider's statements to the medical scribe.     yes

## 2024-07-10 ENCOUNTER — OUTPATIENT (OUTPATIENT)
Dept: OUTPATIENT SERVICES | Facility: HOSPITAL | Age: 89
LOS: 1 days | End: 2024-07-10
Payer: MEDICARE

## 2024-07-10 DIAGNOSIS — Z95.0 PRESENCE OF CARDIAC PACEMAKER: Chronic | ICD-10-CM

## 2024-07-10 DIAGNOSIS — L89.156 PRESSURE-INDUCED DEEP TISSUE DAMAGE OF SACRAL REGION: ICD-10-CM

## 2024-07-10 PROCEDURE — 99212 OFFICE O/P EST SF 10 MIN: CPT

## 2024-07-16 DIAGNOSIS — S60.812A ABRASION OF LEFT WRIST, INITIAL ENCOUNTER: ICD-10-CM

## 2024-07-16 DIAGNOSIS — I25.10 ATHEROSCLEROTIC HEART DISEASE OF NATIVE CORONARY ARTERY WITHOUT ANGINA PECTORIS: ICD-10-CM
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
## 2024-07-16 DIAGNOSIS — M87.180 OSTEONECROSIS DUE TO DRUGS, JAW: ICD-10-CM
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
## 2024-07-16 DIAGNOSIS — I49.9 CARDIAC ARRHYTHMIA, UNSPECIFIED: ICD-10-CM

## 2024-07-16 DIAGNOSIS — I25.2 OLD MYOCARDIAL INFARCTION: ICD-10-CM

## 2024-07-31 ENCOUNTER — OUTPATIENT (OUTPATIENT)
Dept: OUTPATIENT SERVICES | Facility: HOSPITAL | Age: 89
LOS: 1 days | End: 2024-07-31
Payer: MEDICARE

## 2024-07-31 DIAGNOSIS — Z95.0 PRESENCE OF CARDIAC PACEMAKER: Chronic | ICD-10-CM

## 2024-07-31 DIAGNOSIS — L89.156 PRESSURE-INDUCED DEEP TISSUE DAMAGE OF SACRAL REGION: ICD-10-CM

## 2024-07-31 PROCEDURE — 17250 CHEM CAUT OF GRANLTJ TISSUE: CPT

## 2024-07-31 PROCEDURE — 99213 OFFICE O/P EST LOW 20 MIN: CPT

## 2024-08-05 ENCOUNTER — NON-APPOINTMENT (OUTPATIENT)
Age: 89
End: 2024-08-05

## 2024-08-05 ENCOUNTER — APPOINTMENT (OUTPATIENT)
Dept: ELECTROPHYSIOLOGY | Facility: CLINIC | Age: 89
End: 2024-08-05

## 2024-08-05 PROCEDURE — 93280 PM DEVICE PROGR EVAL DUAL: CPT

## 2024-08-09 DIAGNOSIS — I49.9 CARDIAC ARRHYTHMIA, UNSPECIFIED: ICD-10-CM

## 2024-08-09 DIAGNOSIS — S60.812A ABRASION OF LEFT WRIST, INITIAL ENCOUNTER: ICD-10-CM

## 2024-08-09 DIAGNOSIS — I25.2 OLD MYOCARDIAL INFARCTION: ICD-10-CM
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
## 2024-08-09 DIAGNOSIS — I25.10 ATHEROSCLEROTIC HEART DISEASE OF NATIVE CORONARY ARTERY WITHOUT ANGINA PECTORIS: ICD-10-CM
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
## 2024-11-04 ENCOUNTER — NON-APPOINTMENT (OUTPATIENT)
Age: 89
End: 2024-11-04

## 2024-11-04 ENCOUNTER — APPOINTMENT (OUTPATIENT)
Dept: ELECTROPHYSIOLOGY | Facility: CLINIC | Age: 89
End: 2024-11-04
Payer: MEDICARE

## 2024-11-04 VITALS
WEIGHT: 119 LBS | DIASTOLIC BLOOD PRESSURE: 60 MMHG | BODY MASS INDEX: 21.09 KG/M2 | OXYGEN SATURATION: 99 % | HEART RATE: 68 BPM | SYSTOLIC BLOOD PRESSURE: 130 MMHG | HEIGHT: 63 IN

## 2024-11-04 PROCEDURE — 93280 PM DEVICE PROGR EVAL DUAL: CPT

## 2024-11-04 RX ORDER — RIVAROXABAN 15 MG/1
15 TABLET, FILM COATED ORAL
Refills: 0 | Status: ACTIVE | COMMUNITY

## 2024-12-22 ENCOUNTER — INPATIENT (INPATIENT)
Facility: HOSPITAL | Age: 88
LOS: 2 days | Discharge: HOME | DRG: 602 | End: 2024-12-25
Attending: HOSPITALIST | Admitting: INTERNAL MEDICINE
Payer: MEDICARE

## 2024-12-22 VITALS — WEIGHT: 115.08 LBS | HEIGHT: 63 IN

## 2024-12-22 DIAGNOSIS — Z95.0 PRESENCE OF CARDIAC PACEMAKER: Chronic | ICD-10-CM

## 2024-12-22 PROCEDURE — 99285 EMERGENCY DEPT VISIT HI MDM: CPT

## 2024-12-22 NOTE — ED ADULT TRIAGE NOTE - CHIEF COMPLAINT QUOTE
Patient ambulatory to the ER c/o right 1st toe wound. Patient's daughter reports that her mother called her into her room tonight to look at her right foot. Right foot appears red and more swollen than usual according to the daughter. There is a wound below the right 1st toe nail that has yellow drainage. Right is warm to the touch. Patient has a pacemaker and is on Xarelto

## 2024-12-23 DIAGNOSIS — I82.90 ACUTE EMBOLISM AND THROMBOSIS OF UNSPECIFIED VEIN: ICD-10-CM

## 2024-12-23 DIAGNOSIS — L03.90 CELLULITIS, UNSPECIFIED: ICD-10-CM

## 2024-12-23 DIAGNOSIS — I48.0 PAROXYSMAL ATRIAL FIBRILLATION: ICD-10-CM

## 2024-12-23 DIAGNOSIS — M10.9 GOUT, UNSPECIFIED: ICD-10-CM

## 2024-12-23 DIAGNOSIS — I50.32 CHRONIC DIASTOLIC (CONGESTIVE) HEART FAILURE: ICD-10-CM

## 2024-12-23 DIAGNOSIS — Z29.9 ENCOUNTER FOR PROPHYLACTIC MEASURES, UNSPECIFIED: ICD-10-CM

## 2024-12-23 LAB
ALBUMIN SERPL ELPH-MCNC: 3.1 G/DL — LOW (ref 3.3–5)
ALBUMIN SERPL ELPH-MCNC: 3.2 G/DL — LOW (ref 3.3–5)
ALP SERPL-CCNC: 130 U/L — HIGH (ref 40–120)
ALP SERPL-CCNC: 140 U/L — HIGH (ref 40–120)
ALT FLD-CCNC: 14 U/L — SIGNIFICANT CHANGE UP (ref 12–78)
ALT FLD-CCNC: 21 U/L — SIGNIFICANT CHANGE UP (ref 12–78)
ANION GAP SERPL CALC-SCNC: 3 MMOL/L — LOW (ref 5–17)
ANION GAP SERPL CALC-SCNC: 4 MMOL/L — LOW (ref 5–17)
ANISOCYTOSIS BLD QL: SIGNIFICANT CHANGE UP
AST SERPL-CCNC: 25 U/L — SIGNIFICANT CHANGE UP (ref 15–37)
AST SERPL-CCNC: 32 U/L — SIGNIFICANT CHANGE UP (ref 15–37)
BASOPHILS # BLD AUTO: 0.1 K/UL — SIGNIFICANT CHANGE UP (ref 0–0.2)
BASOPHILS # BLD AUTO: 0.11 K/UL — SIGNIFICANT CHANGE UP (ref 0–0.2)
BASOPHILS NFR BLD AUTO: 1 % — SIGNIFICANT CHANGE UP (ref 0–2)
BASOPHILS NFR BLD AUTO: 1.1 % — SIGNIFICANT CHANGE UP (ref 0–2)
BILIRUB SERPL-MCNC: 0.5 MG/DL — SIGNIFICANT CHANGE UP (ref 0.2–1.2)
BILIRUB SERPL-MCNC: 0.6 MG/DL — SIGNIFICANT CHANGE UP (ref 0.2–1.2)
BUN SERPL-MCNC: 53 MG/DL — HIGH (ref 7–23)
BUN SERPL-MCNC: 60 MG/DL — HIGH (ref 7–23)
CALCIUM SERPL-MCNC: 8.9 MG/DL — SIGNIFICANT CHANGE UP (ref 8.5–10.1)
CALCIUM SERPL-MCNC: 9.6 MG/DL — SIGNIFICANT CHANGE UP (ref 8.5–10.1)
CHLORIDE SERPL-SCNC: 111 MMOL/L — HIGH (ref 96–108)
CHLORIDE SERPL-SCNC: 112 MMOL/L — HIGH (ref 96–108)
CK SERPL-CCNC: 20 U/L — LOW (ref 26–192)
CO2 SERPL-SCNC: 24 MMOL/L — SIGNIFICANT CHANGE UP (ref 22–31)
CO2 SERPL-SCNC: 26 MMOL/L — SIGNIFICANT CHANGE UP (ref 22–31)
CREAT SERPL-MCNC: 1.06 MG/DL — SIGNIFICANT CHANGE UP (ref 0.5–1.3)
CREAT SERPL-MCNC: 1.23 MG/DL — SIGNIFICANT CHANGE UP (ref 0.5–1.3)
CRP SERPL-MCNC: 16.5 MG/ML — HIGH (ref 0–5)
CRP SERPL-MCNC: 17.7 MG/ML — HIGH (ref 0–5)
DACRYOCYTES BLD QL SMEAR: SLIGHT — SIGNIFICANT CHANGE UP
EGFR: 42 ML/MIN/1.73M2 — LOW
EGFR: 50 ML/MIN/1.73M2 — LOW
ELLIPTOCYTES BLD QL SMEAR: SLIGHT — SIGNIFICANT CHANGE UP
EOSINOPHIL # BLD AUTO: 0.79 K/UL — HIGH (ref 0–0.5)
EOSINOPHIL # BLD AUTO: 0.89 K/UL — HIGH (ref 0–0.5)
EOSINOPHIL NFR BLD AUTO: 8.3 % — HIGH (ref 0–6)
EOSINOPHIL NFR BLD AUTO: 8.9 % — HIGH (ref 0–6)
ERYTHROCYTE [SEDIMENTATION RATE] IN BLOOD: 11 MM/HR — SIGNIFICANT CHANGE UP (ref 0–20)
ERYTHROCYTE [SEDIMENTATION RATE] IN BLOOD: 8 MM/HR — SIGNIFICANT CHANGE UP (ref 0–20)
GLUCOSE SERPL-MCNC: 118 MG/DL — HIGH (ref 70–99)
GLUCOSE SERPL-MCNC: 94 MG/DL — SIGNIFICANT CHANGE UP (ref 70–99)
GRAM STN FLD: ABNORMAL
HCT VFR BLD CALC: 42.5 % — SIGNIFICANT CHANGE UP (ref 34.5–45)
HCT VFR BLD CALC: 43.9 % — SIGNIFICANT CHANGE UP (ref 34.5–45)
HGB BLD-MCNC: 12.8 G/DL — SIGNIFICANT CHANGE UP (ref 11.5–15.5)
HGB BLD-MCNC: 13.3 G/DL — SIGNIFICANT CHANGE UP (ref 11.5–15.5)
IMM GRANULOCYTES NFR BLD AUTO: 0.5 % — SIGNIFICANT CHANGE UP (ref 0–0.9)
IMM GRANULOCYTES NFR BLD AUTO: 0.6 % — SIGNIFICANT CHANGE UP (ref 0–0.9)
LYMPHOCYTES # BLD AUTO: 0.64 K/UL — LOW (ref 1–3.3)
LYMPHOCYTES # BLD AUTO: 0.97 K/UL — LOW (ref 1–3.3)
LYMPHOCYTES # BLD AUTO: 7.2 % — LOW (ref 13–44)
LYMPHOCYTES # BLD AUTO: 9.1 % — LOW (ref 13–44)
MACROCYTES BLD QL: SLIGHT — SIGNIFICANT CHANGE UP
MAGNESIUM SERPL-MCNC: 2.4 MG/DL — SIGNIFICANT CHANGE UP (ref 1.6–2.6)
MANUAL SMEAR VERIFICATION: SIGNIFICANT CHANGE UP
MCHC RBC-ENTMCNC: 27.1 PG — SIGNIFICANT CHANGE UP (ref 27–34)
MCHC RBC-ENTMCNC: 27.3 PG — SIGNIFICANT CHANGE UP (ref 27–34)
MCHC RBC-ENTMCNC: 30.1 G/DL — LOW (ref 32–36)
MCHC RBC-ENTMCNC: 30.3 G/DL — LOW (ref 32–36)
MCV RBC AUTO: 89.9 FL — SIGNIFICANT CHANGE UP (ref 80–100)
MCV RBC AUTO: 90.1 FL — SIGNIFICANT CHANGE UP (ref 80–100)
MICROCYTES BLD QL: SLIGHT — SIGNIFICANT CHANGE UP
MONOCYTES # BLD AUTO: 0.46 K/UL — SIGNIFICANT CHANGE UP (ref 0–0.9)
MONOCYTES # BLD AUTO: 0.77 K/UL — SIGNIFICANT CHANGE UP (ref 0–0.9)
MONOCYTES NFR BLD AUTO: 5.2 % — SIGNIFICANT CHANGE UP (ref 2–14)
MONOCYTES NFR BLD AUTO: 7.2 % — SIGNIFICANT CHANGE UP (ref 2–14)
NEUTROPHILS # BLD AUTO: 6.83 K/UL — SIGNIFICANT CHANGE UP (ref 1.8–7.4)
NEUTROPHILS # BLD AUTO: 7.9 K/UL — HIGH (ref 1.8–7.4)
NEUTROPHILS NFR BLD AUTO: 73.8 % — SIGNIFICANT CHANGE UP (ref 43–77)
NEUTROPHILS NFR BLD AUTO: 77.1 % — HIGH (ref 43–77)
PHOSPHATE SERPL-MCNC: 3.2 MG/DL — SIGNIFICANT CHANGE UP (ref 2.5–4.5)
PLAT MORPH BLD: NORMAL — SIGNIFICANT CHANGE UP
PLATELET # BLD AUTO: 298 K/UL — SIGNIFICANT CHANGE UP (ref 150–400)
PLATELET # BLD AUTO: 321 K/UL — SIGNIFICANT CHANGE UP (ref 150–400)
POIKILOCYTOSIS BLD QL AUTO: SLIGHT — SIGNIFICANT CHANGE UP
POTASSIUM SERPL-MCNC: 4 MMOL/L — SIGNIFICANT CHANGE UP (ref 3.5–5.3)
POTASSIUM SERPL-MCNC: 4.6 MMOL/L — SIGNIFICANT CHANGE UP (ref 3.5–5.3)
POTASSIUM SERPL-SCNC: 4 MMOL/L — SIGNIFICANT CHANGE UP (ref 3.5–5.3)
POTASSIUM SERPL-SCNC: 4.6 MMOL/L — SIGNIFICANT CHANGE UP (ref 3.5–5.3)
PROT SERPL-MCNC: 6.3 GM/DL — SIGNIFICANT CHANGE UP (ref 6–8.3)
PROT SERPL-MCNC: 6.8 GM/DL — SIGNIFICANT CHANGE UP (ref 6–8.3)
RBC # BLD: 4.73 M/UL — SIGNIFICANT CHANGE UP (ref 3.8–5.2)
RBC # BLD: 4.87 M/UL — SIGNIFICANT CHANGE UP (ref 3.8–5.2)
RBC # FLD: 21.2 % — HIGH (ref 10.3–14.5)
RBC # FLD: 21.5 % — HIGH (ref 10.3–14.5)
RBC BLD AUTO: ABNORMAL
SODIUM SERPL-SCNC: 140 MMOL/L — SIGNIFICANT CHANGE UP (ref 135–145)
SODIUM SERPL-SCNC: 140 MMOL/L — SIGNIFICANT CHANGE UP (ref 135–145)
SPECIMEN SOURCE: SIGNIFICANT CHANGE UP
WBC # BLD: 10.7 K/UL — HIGH (ref 3.8–10.5)
WBC # BLD: 8.86 K/UL — SIGNIFICANT CHANGE UP (ref 3.8–10.5)
WBC # FLD AUTO: 10.7 K/UL — HIGH (ref 3.8–10.5)
WBC # FLD AUTO: 8.86 K/UL — SIGNIFICANT CHANGE UP (ref 3.8–10.5)

## 2024-12-23 PROCEDURE — 36415 COLL VENOUS BLD VENIPUNCTURE: CPT

## 2024-12-23 PROCEDURE — 85025 COMPLETE CBC W/AUTO DIFF WBC: CPT

## 2024-12-23 PROCEDURE — 93926 LOWER EXTREMITY STUDY: CPT | Mod: 26,RT

## 2024-12-23 PROCEDURE — 85027 COMPLETE CBC AUTOMATED: CPT

## 2024-12-23 PROCEDURE — 85652 RBC SED RATE AUTOMATED: CPT

## 2024-12-23 PROCEDURE — 99223 1ST HOSP IP/OBS HIGH 75: CPT | Mod: 25

## 2024-12-23 PROCEDURE — 99222 1ST HOSP IP/OBS MODERATE 55: CPT

## 2024-12-23 PROCEDURE — 87186 SC STD MICRODIL/AGAR DIL: CPT

## 2024-12-23 PROCEDURE — 11730 AVULSION NAIL PLATE SIMPLE 1: CPT | Mod: T5

## 2024-12-23 PROCEDURE — 93971 EXTREMITY STUDY: CPT | Mod: 26,RT

## 2024-12-23 PROCEDURE — 82550 ASSAY OF CK (CPK): CPT

## 2024-12-23 PROCEDURE — 83735 ASSAY OF MAGNESIUM: CPT

## 2024-12-23 PROCEDURE — 71045 X-RAY EXAM CHEST 1 VIEW: CPT | Mod: 26

## 2024-12-23 PROCEDURE — 73630 X-RAY EXAM OF FOOT: CPT | Mod: 26,RT

## 2024-12-23 PROCEDURE — 87040 BLOOD CULTURE FOR BACTERIA: CPT

## 2024-12-23 PROCEDURE — 71045 X-RAY EXAM CHEST 1 VIEW: CPT

## 2024-12-23 PROCEDURE — 87070 CULTURE OTHR SPECIMN AEROBIC: CPT

## 2024-12-23 PROCEDURE — 80048 BASIC METABOLIC PNL TOTAL CA: CPT

## 2024-12-23 PROCEDURE — 86140 C-REACTIVE PROTEIN: CPT

## 2024-12-23 PROCEDURE — 87077 CULTURE AEROBIC IDENTIFY: CPT

## 2024-12-23 PROCEDURE — 80053 COMPREHEN METABOLIC PANEL: CPT

## 2024-12-23 PROCEDURE — 84100 ASSAY OF PHOSPHORUS: CPT

## 2024-12-23 RX ORDER — INFLUENZA A VIRUS A/WISCONSIN/588/2019 (H1N1) RECOMBINANT HEMAGGLUTININ ANTIGEN, INFLUENZA A VIRUS A/DARWIN/6/2021 (H3N2) RECOMBINANT HEMAGGLUTININ ANTIGEN, INFLUENZA B VIRUS B/AUSTRIA/1359417/2021 RECOMBINANT HEMAGGLUTININ ANTIGEN, AND INFLUENZA B VIRUS B/PHUKET/3073/2013 RECOMBINANT HEMAGGLUTININ ANTIGEN 45; 45; 45; 45 UG/.5ML; UG/.5ML; UG/.5ML; UG/.5ML
0.5 INJECTION INTRAMUSCULAR ONCE
Refills: 0 | Status: COMPLETED | OUTPATIENT
Start: 2024-12-23 | End: 2024-12-23

## 2024-12-23 RX ORDER — METOPROLOL TARTRATE 50 MG
50 TABLET ORAL AT BEDTIME
Refills: 0 | Status: DISCONTINUED | OUTPATIENT
Start: 2024-12-23 | End: 2024-12-25

## 2024-12-23 RX ORDER — ATORVASTATIN CALCIUM 40 MG/1
10 TABLET, FILM COATED ORAL AT BEDTIME
Refills: 0 | Status: DISCONTINUED | OUTPATIENT
Start: 2024-12-23 | End: 2024-12-25

## 2024-12-23 RX ORDER — RIVAROXABAN 2.5 MG/1
15 TABLET, FILM COATED ORAL
Refills: 0 | Status: DISCONTINUED | OUTPATIENT
Start: 2024-12-23 | End: 2024-12-25

## 2024-12-23 RX ORDER — GINKGO BILOBA 40 MG
3 CAPSULE ORAL AT BEDTIME
Refills: 0 | Status: DISCONTINUED | OUTPATIENT
Start: 2024-12-23 | End: 2024-12-25

## 2024-12-23 RX ORDER — VITAMIN A 10000 UNIT
1 TABLET ORAL DAILY
Refills: 0 | Status: DISCONTINUED | OUTPATIENT
Start: 2024-12-23 | End: 2024-12-25

## 2024-12-23 RX ORDER — CLINDAMYCIN HYDROCHLORIDE 300 MG/1
600 CAPSULE ORAL ONCE
Refills: 0 | Status: COMPLETED | OUTPATIENT
Start: 2024-12-23 | End: 2024-12-23

## 2024-12-23 RX ORDER — AMMONIUM LACTATE 12 %
1 LOTION (GRAM) TOPICAL EVERY 12 HOURS
Refills: 0 | Status: DISCONTINUED | OUTPATIENT
Start: 2024-12-23 | End: 2024-12-25

## 2024-12-23 RX ORDER — VANCOMYCIN HYDROCHLORIDE 5 G/100ML
750 INJECTION, POWDER, LYOPHILIZED, FOR SOLUTION INTRAVENOUS ONCE
Refills: 0 | Status: COMPLETED | OUTPATIENT
Start: 2024-12-23 | End: 2024-12-23

## 2024-12-23 RX ORDER — ACETAMINOPHEN 80 MG/.8ML
650 SOLUTION/ DROPS ORAL EVERY 6 HOURS
Refills: 0 | Status: DISCONTINUED | OUTPATIENT
Start: 2024-12-23 | End: 2024-12-25

## 2024-12-23 RX ORDER — B COMPLEX, C NO.20/FOLIC ACID 1 MG
1 CAPSULE ORAL DAILY
Refills: 0 | Status: DISCONTINUED | OUTPATIENT
Start: 2024-12-23 | End: 2024-12-25

## 2024-12-23 RX ORDER — CEFTRIAXONE SODIUM 1 G/1
1000 INJECTION, POWDER, FOR SOLUTION INTRAMUSCULAR; INTRAVENOUS EVERY 24 HOURS
Refills: 0 | Status: DISCONTINUED | OUTPATIENT
Start: 2024-12-23 | End: 2024-12-25

## 2024-12-23 RX ORDER — ALLOPURINOL 100 MG/1
100 TABLET ORAL
Refills: 0 | Status: DISCONTINUED | OUTPATIENT
Start: 2024-12-23 | End: 2024-12-25

## 2024-12-23 RX ORDER — SODIUM CHLORIDE 9 MG/ML
500 INJECTION, SOLUTION INTRAMUSCULAR; INTRAVENOUS; SUBCUTANEOUS ONCE
Refills: 0 | Status: COMPLETED | OUTPATIENT
Start: 2024-12-23 | End: 2024-12-23

## 2024-12-23 RX ORDER — VANCOMYCIN HYDROCHLORIDE 5 G/100ML
500 INJECTION, POWDER, LYOPHILIZED, FOR SOLUTION INTRAVENOUS EVERY 12 HOURS
Refills: 0 | Status: DISCONTINUED | OUTPATIENT
Start: 2024-12-23 | End: 2024-12-25

## 2024-12-23 RX ORDER — VANCOMYCIN HYDROCHLORIDE 5 G/100ML
750 INJECTION, POWDER, LYOPHILIZED, FOR SOLUTION INTRAVENOUS EVERY 12 HOURS
Refills: 0 | Status: DISCONTINUED | OUTPATIENT
Start: 2024-12-23 | End: 2024-12-23

## 2024-12-23 RX ORDER — MONTELUKAST SODIUM 10 MG/1
10 TABLET, FILM COATED ORAL DAILY
Refills: 0 | Status: DISCONTINUED | OUTPATIENT
Start: 2024-12-23 | End: 2024-12-25

## 2024-12-23 RX ORDER — ONDANSETRON 4 MG/1
4 TABLET ORAL EVERY 8 HOURS
Refills: 0 | Status: DISCONTINUED | OUTPATIENT
Start: 2024-12-23 | End: 2024-12-25

## 2024-12-23 RX ORDER — MAG HYDROX/ALUMINUM HYD/SIMETH 200-200-20
30 SUSPENSION, ORAL (FINAL DOSE FORM) ORAL EVERY 4 HOURS
Refills: 0 | Status: DISCONTINUED | OUTPATIENT
Start: 2024-12-23 | End: 2024-12-25

## 2024-12-23 RX ADMIN — CLINDAMYCIN HYDROCHLORIDE 100 MILLIGRAM(S): 300 CAPSULE ORAL at 01:39

## 2024-12-23 RX ADMIN — ACETAMINOPHEN 650 MILLIGRAM(S): 80 SOLUTION/ DROPS ORAL at 14:45

## 2024-12-23 RX ADMIN — VANCOMYCIN HYDROCHLORIDE 166.67 MILLIGRAM(S): 5 INJECTION, POWDER, LYOPHILIZED, FOR SOLUTION INTRAVENOUS at 04:07

## 2024-12-23 RX ADMIN — CEFTRIAXONE SODIUM 1000 MILLIGRAM(S): 1 INJECTION, POWDER, FOR SOLUTION INTRAMUSCULAR; INTRAVENOUS at 03:39

## 2024-12-23 RX ADMIN — RIVAROXABAN 15 MILLIGRAM(S): 2.5 TABLET, FILM COATED ORAL at 17:17

## 2024-12-23 RX ADMIN — ATORVASTATIN CALCIUM 10 MILLIGRAM(S): 40 TABLET, FILM COATED ORAL at 22:07

## 2024-12-23 RX ADMIN — Medication 1 TABLET(S): at 10:25

## 2024-12-23 RX ADMIN — Medication 1 APPLICATION(S): at 22:07

## 2024-12-23 RX ADMIN — ALLOPURINOL 100 MILLIGRAM(S): 100 TABLET ORAL at 22:08

## 2024-12-23 RX ADMIN — VANCOMYCIN HYDROCHLORIDE 100 MILLIGRAM(S): 5 INJECTION, POWDER, LYOPHILIZED, FOR SOLUTION INTRAVENOUS at 17:16

## 2024-12-23 RX ADMIN — MONTELUKAST SODIUM 10 MILLIGRAM(S): 10 TABLET, FILM COATED ORAL at 10:26

## 2024-12-23 RX ADMIN — SODIUM CHLORIDE 500 MILLILITER(S): 9 INJECTION, SOLUTION INTRAMUSCULAR; INTRAVENOUS; SUBCUTANEOUS at 01:39

## 2024-12-23 RX ADMIN — Medication 1 MILLIGRAM(S): at 10:25

## 2024-12-23 RX ADMIN — ALLOPURINOL 100 MILLIGRAM(S): 100 TABLET ORAL at 10:26

## 2024-12-23 NOTE — DIETITIAN INITIAL EVALUATION ADULT - OTHER INFO
89 y/o F with PMHx of HFpEF, CAD, Afib (on Warfarin and recently switched to DOAC), SSS s/p PPM, DLD, HTN presents with complaints of foot pain, erythema and warmth of right foot for 2 days. Daughter at bedside who is also the caretaker reports she noticed the right 1st toe had a wound 2 days ago, developed swelling and erythema rapidly and worsened today with purulent discharge. Patient has had chills, generalized "shaking" and fatigue. No other acute complaints and denies other ROS. Cellulitis - being given vancomycin. Right toenail removed; xrays ordered and wound cx taken. Chronic HFpEF - holding lasix for now. Admitting diagnosis: cellulitis    Pt known to nutr services; previously met criteria for PCM (most recent June 2024). Continues to meet criteria on this admit. Pt and dtr present at time of RD visit. Pt had ensure shake for breakfast which dtr brought in for her. Pt dtr reports appetite is good. UBW ~ 115-120#; no known wt changes at this time. RD obtained bed scale wt 115# on 12/23/24 - 1+ R/L foot. Weight history reviewed: 113# on 6/1/24 (taken by RD) 2+ BL LE edema; 107.8# on 4/28/24 (taken by RD). Unable to determine any pertinent wt changes at this time 2/2 ? fluid loss/shifts. Pt appears thin; NFPE reveals mod-sev muscle/fat wasting. Consider liberalizing diet to regular to optimize nutritional status. Will add ensure plus high protein BID to optimize PO intake (provides 350 kcal, 20g protein/ shake). Pt requires assistance with meals - needs help with cutting foods into small pieces if dtr not here. Requires softer foods (pancakes, Vietnamese toast, tuna salad/chicken salad - small pieces on soft bread no crust, peanut butter and jelly sandwich, etc). Please see additional recommendations below.

## 2024-12-23 NOTE — DIETITIAN NUTRITION RISK NOTIFICATION - ADDITIONAL COMMENTS/DIETITIAN RECOMMENDATIONS
1. Consider liberalizing diet to regular to maximize caloric and nutrient intake.   2. Add ensure plus high protein BID to optimize PO intake (provides 350 kcal, 20g protein/ shake)  3. C/w MVI w/ minerals daily to ensure 100% RDA met   4. Consider adding thiamine 100 mg daily 2/2 poor PO intake/ malnutrition  5. Monitor bowel movements, if no BM for >3 days, consider implementing bowel regimen.  6. Monitor daily lytes/min and replete prn  7. Obtain weekly wt to track/trend changes  8. Encourage protein-rich foods, maximize food preferences, assistance with meals  9. Confirm goals of care regarding nutrition support   RD will continue to monitor PO intake, labs, hydration, and wt prn.

## 2024-12-23 NOTE — CONSULT NOTE ADULT - ASSESSMENT
A: 89-year-old Female seen for the followin. Cellulitis to Right Lower extremity secondary to underlying wound extending to nail eponychium      P:   Chart reviewed and Patient evaluated;  Discussed diagnosis and treatment with patient.   X-rays ordered --> will f/u  result  On evaluation, pt has an open lesion to the right hallux with pocket of pus collection adjacent the hallucal eponychium, noted Loose/hanging hallucal toenails from the nailbed. Pt is afebrile, no leukocytosis, WBC 10.7  Loose hallucal toenails fell off passively   Wound flush with normal saline  Wound cx taken --> will f/u result  Applied xeroform with dry sterile dressing  Podiatry to continue with wound care  WBAT  to the right foot in dispensed surgical shoe  Continue antibiotics as per ID  All additional care per Med appreciated  Patient demonstrated verbal understanding of all interventions and tolerated interventions well without any complications.   Podiatry will follow while in house      Case D/W attending Dr. Lozano A: 89-year-old Female seen for the followin. Cellulitis to Right Lower extremity secondary to underlying wound extending to nail eponychium      P:   Chart reviewed and Patient evaluated;  Discussed diagnosis and treatment with patient.   X-rays reviewed: on wet reading showing no soft tissue emphysema, noted diffuse osteopenia  On evaluation, pt has an open lesion to the right hallux with pocket of pus collection adjacent the hallucal eponychium, noted Loose/hanging hallucal toenails from the nailbed. Pt is afebrile, no leukocytosis, WBC 10.7  Loose hallucal toenails fell off passively   Wound flush with normal saline  Wound cx taken --> will f/u result  Applied xeroform with dry sterile dressing  Podiatry to continue with wound care  WBAT  to the right foot in dispensed surgical shoe  Continue antibiotics as per ID  All additional care per Med appreciated  Patient demonstrated verbal understanding of all interventions and tolerated interventions well without any complications.   Podiatry will follow while in house      Case D/W attending Dr. Lozano

## 2024-12-23 NOTE — DIETITIAN INITIAL EVALUATION ADULT - ADD RECOMMEND
Stable
1. Consider liberalizing diet to regular to maximize caloric and nutrient intake.   2. Add ensure plus high protein BID to optimize PO intake (provides 350 kcal, 20g protein/ shake)  3. C/w MVI w/ minerals daily to ensure 100% RDA met   4. Consider adding thiamine 100 mg daily 2/2 poor PO intake/ malnutrition  5. Monitor bowel movements, if no BM for >3 days, consider implementing bowel regimen.  6. Monitor daily lytes/min and replete prn  7. Obtain weekly wt to track/trend changes  8. Encourage protein-rich foods, maximize food preferences, assistance with meals  9. Confirm goals of care regarding nutrition support   RD will continue to monitor PO intake, labs, hydration, and wt prn.

## 2024-12-23 NOTE — CONSULT NOTE ADULT - SUBJECTIVE AND OBJECTIVE BOX
Date of consult: 12/23/24      HPI:  89-year-old female with PMH of HFpEF, CAD, Afib (on Warfarin and recently switched to DOAC), SSS s/p PPM, DLD, HTN presents with complaints of foot pain, erythema and warmth of right foot for 2 days. Daughter at bedside who is also the caretaker reports she noticed the right 1st toe had a wound 2 days ago, developed swelling and erythema rapidly and worsened today with purulent discharge. Patient has had chills, generalized "shaking" and fatigue. No other acute complaints and denies other ROS. In ED patient with stable vitals, CBC noted for WBC 10.7, H/H 13/43, Plt 321. CMP noted for BUN/Cr 60/1.23, CRP 16. US venous and arterial showed patent vessels. Patient admitted to  for purulent cellulitis of RLE.  (23 Dec 2024 01:17)  Podiatry was consulted for RLE cellulitis. Patient is a fair historian and does not recall when the redness started. information from chart check shows the right 1st toe wound was noticed 2 days ago, developed swelling and erythema rapidly and worsened today with purulent discharge. Patient denies any pain to the right foot. Pt denies  shortness of breath, nausea and fever.            PMH: HTN (hypertension)    Afib    Sleep apnea    Hyperlipidemia    Pacemaker    SSS (sick sinus syndrome)    Carotid artery disease    Chronic CHF      PSH:Pacemaker        Allergies:penicillin (Rash)  Eliquis (Unknown)      Labs:                          12.8   8.86  )-----------( 298      ( 23 Dec 2024 07:50 )             42.5     WBC Trend  8.86 Date (12-23 @ 07:50)  10.70[H] Date (12-23 @ 00:30)      Chem  12-23    140  |  112[H]  |  53[H]  ----------------------------<  94  4.0   |  24  |  1.06    Ca    8.9      23 Dec 2024 07:50  Phos  3.2     12-23  Mg     2.4     12-23    TPro  6.3  /  Alb  3.1[L]  /  TBili  0.5  /  DBili  x   /  AST  25  /  ALT  14  /  AlkPhos  130[H]  12-23          T(F): 97.9 (12-23-24 @ 07:29), Max: 98.8 (12-22-24 @ 22:11)  HR: 60 (12-23-24 @ 07:29) (60 - 76)  BP: 113/50 (12-23-24 @ 07:29) (113/50 - 154/58)  RR: 17 (12-23-24 @ 07:29) (17 - 18)  SpO2: 98% (12-23-24 @ 07:29) (97% - 99%)  Wt(kg): --    REVIEW OF SYSTEMS:    CONSTITUTIONAL: No weakness, fevers or chills  EYES: No visual changes  RESPIRATORY: No cough, wheezing; No shortness of breath  CARDIOVASCULAR: No chest pain or palpitations  GASTROINTESTINAL: No abdominal or epigastric pain. No nausea, vomiting; No diarrhea or constipation.   GENITOURINARY: No dysuria, frequency or hematuria  NEUROLOGICAL: No numbness or weakness  SKIN: See physical examination.  All other review of systems is negative unless indicated above    Physical Exam:   Constitutional: NAD, alert;  Lower Extremity Focus  Derm:  Skin warm, dry and supple bilateral.    Right LE: open lesion to the right hallux with pocket of pus collection adjacent the hallucal eponychium, noted Loose/hanging hallucal toenails from the nailbed, wound base Granular, wound size (1.5 0.7) - edema, + vijaya-wound erythema, - tracking/tunneling, - probe to bone.   Vascular: Dorsalis Pedis and Posterior Tibial pulses weakly palpable.   Neuro: Protective sensation diminished to the level of the digits bilateral.  MSK: Muscle strength 5/5 all major muscle groups bilateral.

## 2024-12-23 NOTE — ED ADULT NURSE NOTE - NSFALLHARMRISKINTERV_ED_ALL_ED

## 2024-12-23 NOTE — DIETITIAN INITIAL EVALUATION ADULT - PERTINENT MEDS FT
MEDICATIONS  (STANDING):  allopurinol 100 milliGRAM(s) Oral two times a day  ammonium lactate 12% Lotion 1 Application(s) Topical every 12 hours  atorvastatin 10 milliGRAM(s) Oral at bedtime  cefTRIAXone Injectable. 1000 milliGRAM(s) IV Push every 24 hours  folic acid 1 milliGRAM(s) Oral daily  influenza  Vaccine (HIGH DOSE) 0.5 milliLiter(s) IntraMuscular once  metoprolol succinate ER 50 milliGRAM(s) Oral at bedtime  montelukast 10 milliGRAM(s) Oral daily  multivitamin 1 Tablet(s) Oral daily  rivaroxaban 15 milliGRAM(s) Oral with dinner  vancomycin  IVPB 500 milliGRAM(s) IV Intermittent every 12 hours    MEDICATIONS  (PRN):  acetaminophen     Tablet .. 650 milliGRAM(s) Oral every 6 hours PRN Temp greater or equal to 38C (100.4F), Mild Pain (1 - 3)  aluminum hydroxide/magnesium hydroxide/simethicone Suspension 30 milliLiter(s) Oral every 4 hours PRN Dyspepsia  melatonin 3 milliGRAM(s) Oral at bedtime PRN Insomnia  ondansetron Injectable 4 milliGRAM(s) IV Push every 8 hours PRN Nausea and/or Vomiting

## 2024-12-23 NOTE — ED PROVIDER NOTE - PHYSICAL EXAMINATION
Const: Well appearing, NAD  Eyes: PERRL, EOM intact  CV: RRR, no murmurs, no chest wall tenderness, distal pulses intact  Resp: CTAB, normal resp effort  GI: soft, nondistended, nontender. No CVA tenderness  MSK: Full ROM, no muscle or bony deformity or tenderness  Neuro: AOx3, GCS 15, No focal deficits  Skin: Right dorsal foot erythema with exudates.   Psych: calm, cooperative.

## 2024-12-23 NOTE — H&P ADULT - HISTORY OF PRESENT ILLNESS
89-year-old female with PMH of HFpEF, CAD, Afib (on Warfarin and recently switched to DOAC), SSS s/p PPM, DLD, HTN presents with complaints of foot pain, erythema and warmth. 89-year-old female with PMH of HFpEF, CAD, Afib (on Warfarin and recently switched to DOAC), SSS s/p PPM, DLD, HTN presents with complaints of foot pain, erythema and warmth of right foot for 2 days. Daughter at bedside who is also the caretaker reports she noticed the right 1st toe had a wound 2 days ago, developed swelling and erythema rapidly and worsened today with purulent discharge. Patient has had chills, generalized "shaking" and fatigue. No other acute complaints and denies other ROS. In ED patient with stable vitals, CBC noted for WBC 10.7, H/H 13/43, Plt 321. CMP noted for BUN/Cr 60/1.23, CRP 16. US venous and arterial showed patent vessels. Patient admitted to  for purulent cellulitis of RLE.

## 2024-12-23 NOTE — PROGRESS NOTE ADULT - NUTRITIONAL ASSESSMENT
This patient has been assessed with a concern for Malnutrition and has been determined to have a diagnosis/diagnoses of Severe protein-calorie malnutrition.    This patient is being managed with:   Diet DASH/TLC-  Sodium & Cholesterol Restricted  Entered: Dec 23 2024  3:34AM    The following pending diet order is being considered for treatment of Severe protein-calorie malnutrition:  Diet Regular-  Supplement Feeding Modality:  Oral  Ensure Plus High Protein Cans or Servings Per Day:  1       Frequency:  Two Times a day  Entered: Dec 23 2024 12:33PM

## 2024-12-23 NOTE — ED PROVIDER NOTE - CLINICAL SUMMARY MEDICAL DECISION MAKING FREE TEXT BOX
Patient presents to the ER with right foot pain, erythema, exudates.  Patient is neurovascularly intact on exam.  Has tenderness over affected area.  Appears to be cellulitic.  No fevers or chills.  Labs nonactionable, imaging with no acute findings.  Patient given clindamycin.  Admitted to medicine in stable condition.

## 2024-12-23 NOTE — CONSULT NOTE ADULT - ASSESSMENT
89-year-old female with h/o HFpEF, CAD, Afib (on Warfarin and recently switched to DOAC), SSS s/p PPM, DLD, HTN was admitted on 12/23 for foot pain, erythema and warmth of right foot for 2 days. Daughter at bedside who is also the caretaker reports she noticed the right 1st toe had a wound 2 days ago, developed swelling and erythema rapidly and worsened today with purulent discharge. Patient has had chills, generalized "shaking" and fatigue. In ED patient received ceftriaxone, clindamycin and vancomycin IV.     #RLE and foot cellulitis  #Right great toe ulcer  #Allergy to PCN  -obtain BC x 2  -obtain wound c/s  -start vancomycin 750 mg IV q12h and ceftriaxone 1 gm IV qd  -reason for abx use and side effects reviewed with patient; monitor BMP and vancomycin trough levels   -wound care  -old chart reviewed to assess prior cultures  -monitor temps  -f/u CBC  -supportive care  2. Other issues:   -care per medicine    Clinical team may change from intravenous to oral antibiotics when the following criteria are met:   1. Patient is clinically improving/stable       a)	Improved signs and symptoms of infection from initial presentation       b)	Afebrile for 24 hours       c)	Leukocytosis trending towards normal range   2. Patient is tolerating oral intake   3. Initial/repeat blood cultures are negative     When above criteria met may change iv antibiotics to an oral agent  Cannot advise changing to oral antibiotic therapy until culture sensitivity is available.     89-year-old female with h/o HFpEF, CAD, Afib (on Warfarin and recently switched to DOAC), SSS s/p PPM, DLD, HTN was admitted on 12/23 for foot pain, erythema and warmth of right foot for 2 days. Daughter at bedside who is also the caretaker reports she noticed the right 1st toe had a wound 2 days ago, developed swelling and erythema rapidly and worsened today with purulent discharge. Patient has had chills, generalized "shaking" and fatigue. In ED patient received ceftriaxone, clindamycin and vancomycin IV.     #Right foot cellulitis  #Right great toe ulcer  #B/l lower extremities stasis dermatitis. Probable underlying PVD.  #Allergy to PCN  -obtain BC x 2  -obtain wound c/s  -start vancomycin 500 mg IV q12h and ceftriaxone 1 gm IV qd  -reason for abx use and side effects reviewed with patient; monitor BMP and vancomycin trough levels   -wound care  -old chart reviewed to assess prior cultures  -monitor temps  -f/u CBC  -supportive care  2. Other issues:   -care per medicine    Clinical team may change from intravenous to oral antibiotics when the following criteria are met:   1. Patient is clinically improving/stable       a)	Improved signs and symptoms of infection from initial presentation       b)	Afebrile for 24 hours       c)	Leukocytosis trending towards normal range   2. Patient is tolerating oral intake   3. Initial/repeat blood cultures are negative     When above criteria met may change iv antibiotics to an oral agent  Cannot advise changing to oral antibiotic therapy until culture sensitivity is available.

## 2024-12-23 NOTE — CONSULT NOTE ADULT - SUBJECTIVE AND OBJECTIVE BOX
Patient is a 89y old  Female who presents with a chief complaint of right daniela redness and toe wound    HPI:  89-year-old female with h/o HFpEF, CAD, Afib (on Warfarin and recently switched to DOAC), SSS s/p PPM, DLD, HTN was admitted on  for foot pain, erythema and warmth of right foot for 2 days. Daughter at bedside who is also the caretaker reports she noticed the right 1st toe had a wound 2 days ago, developed swelling and erythema rapidly and worsened today with purulent discharge. Patient has had chills, generalized "shaking" and fatigue. In ED patient received ceftriaxone, clindamycin and vancomycin IV.     PMH: as above  PSH: as above  Meds: per reconciliation sheet, noted below  MEDICATIONS  (STANDING):  allopurinol 100 milliGRAM(s) Oral two times a day  atorvastatin 10 milliGRAM(s) Oral at bedtime  cefTRIAXone Injectable. 1000 milliGRAM(s) IV Push every 24 hours  folic acid 1 milliGRAM(s) Oral daily  influenza  Vaccine (HIGH DOSE) 0.5 milliLiter(s) IntraMuscular once  metoprolol succinate ER 50 milliGRAM(s) Oral at bedtime  montelukast 10 milliGRAM(s) Oral daily  multivitamin 1 Tablet(s) Oral daily  rivaroxaban 15 milliGRAM(s) Oral with dinner    MEDICATIONS  (PRN):  acetaminophen     Tablet .. 650 milliGRAM(s) Oral every 6 hours PRN Temp greater or equal to 38C (100.4F), Mild Pain (1 - 3)  aluminum hydroxide/magnesium hydroxide/simethicone Suspension 30 milliLiter(s) Oral every 4 hours PRN Dyspepsia  melatonin 3 milliGRAM(s) Oral at bedtime PRN Insomnia  ondansetron Injectable 4 milliGRAM(s) IV Push every 8 hours PRN Nausea and/or Vomiting    Allergies    penicillin (Rash)  Eliquis (Unknown)    Intolerances      Social: no smoking, no alcohol, no illegal drugs; no recent travel, no exposure to TB  FAMILY HISTORY:  CAD (coronary artery disease) (Father, Mother)    Family history of breast cancer in sister (Sibling)      no history of premature cardiovascular disease in first degree relatives    ROS: the patient denies fever, no chills, no HA, no seizures, no dizziness, no sore throat, no nasal congestion, no blurry vision, no CP, no palpitations, no SOB, no cough, no abdominal pain, no diarrhea, no N/V, no dysuria, no leg pain, no claudication, has right foot pain and rash, no joint aches, no rectal pain or bleeding, no night sweats  All other systems reviewed and are negative    Vital Signs Last 24 Hrs  T(C): 36.6 (23 Dec 2024 07:29), Max: 37.1 (22 Dec 2024 22:11)  T(F): 97.9 (23 Dec 2024 07:29), Max: 98.8 (22 Dec 2024 22:11)  HR: 60 (23 Dec 2024 07:) (60 - 76)  BP: 113/50 (23 Dec 2024 07:29) (113/50 - 154/58)  BP(mean): 85 (23 Dec 2024 02:23) (85 - 87)  RR: 17 (23 Dec 2024 07:) (17 - 18)  SpO2: 98% (23 Dec 2024 07:) (97% - 99%)    Parameters below as of 23 Dec 2024 07:29  Patient On (Oxygen Delivery Method): room air      Daily Height in cm: 160.02 (22 Dec 2024 22:10)    Daily Weight in k.5 (23 Dec 2024 06:39)    PE:    Constitutional:  No acute distress  HEENT: NC/AT, EOMI, PERRLA, conjunctivae clear; ears and nose atraumatic; pharynx benign  Neck: supple; thyroid not palpable  Back: no tenderness  Respiratory: respiratory effort normal; clear to auscultation  Cardiovascular: S1S2 regular, no murmurs  Abdomen: soft, not tender, not distended, positive BS; no liver or spleen organomegaly  Genitourinary: no suprapubic tenderness  Lymphatic: no LN palpable  Musculoskeletal: no muscle tenderness, no joint swelling or tenderness  Extremities: no pedal edema  Right foot erythema, edema, warmth and tenderness  Right great toe ulcer  Neurological/ Psychiatric: alert, judgement and insight impaired; moving all extremities  Skin: no rashes; no palpable lesions    Labs: all available labs reviewed                        12.8   8.86  )-----------( 298      ( 23 Dec 2024 07:50 )             42.5     12-    140  |  112[H]  |  53[H]  ----------------------------<  94  4.0   |  24  |  1.06    Ca    8.9      23 Dec 2024 07:50  Phos  3.2       Mg     2.4         TPro  6.3  /  Alb  3.1[L]  /  TBili  0.5  /  DBili  x   /  AST  25  /  ALT  14  /  AlkPhos  130[H]       LIVER FUNCTIONS - ( 23 Dec 2024 07:50 )  Alb: 3.1 g/dL / Pro: 6.3 gm/dL / ALK PHOS: 130 U/L / ALT: 14 U/L / AST: 25 U/L / GGT: x           Radiology: all available radiological tests reviewed    < from: Xray Chest 1 View- PORTABLE-Urgent (Xray Chest 1 View- PORTABLE-Urgent .) (24 @ 02:55) >  No focal parenchymal opacities, pleural effusions, or pneumothorax.  < end of copied text >    < from: US Duplex Venous Lower Ext Ltd, Right (24 @ 00:14) >  No evidence of right lower extremity deep venous thrombosis.  < end of copied text >    Advanced directives addressed: full resuscitation Patient is a 89y old  Female who presents with a chief complaint of right daniela redness and toe wound    HPI:  89-year-old female with h/o HFpEF, CAD, Afib (on Warfarin and recently switched to DOAC), SSS s/p PPM, DLD, HTN was admitted on  for foot pain, erythema and warmth of right foot for 2 days. Daughter at bedside who is also the caretaker reports she noticed the right 1st toe had a wound 2 days ago, developed swelling and erythema rapidly and worsened today with purulent discharge. Patient has had chills, generalized "shaking" and fatigue. In ED patient received ceftriaxone, clindamycin and vancomycin IV.     PMH: as above  PSH: as above  Meds: per reconciliation sheet, noted below  MEDICATIONS  (STANDING):  allopurinol 100 milliGRAM(s) Oral two times a day  atorvastatin 10 milliGRAM(s) Oral at bedtime  cefTRIAXone Injectable. 1000 milliGRAM(s) IV Push every 24 hours  folic acid 1 milliGRAM(s) Oral daily  influenza  Vaccine (HIGH DOSE) 0.5 milliLiter(s) IntraMuscular once  metoprolol succinate ER 50 milliGRAM(s) Oral at bedtime  montelukast 10 milliGRAM(s) Oral daily  multivitamin 1 Tablet(s) Oral daily  rivaroxaban 15 milliGRAM(s) Oral with dinner    MEDICATIONS  (PRN):  acetaminophen     Tablet .. 650 milliGRAM(s) Oral every 6 hours PRN Temp greater or equal to 38C (100.4F), Mild Pain (1 - 3)  aluminum hydroxide/magnesium hydroxide/simethicone Suspension 30 milliLiter(s) Oral every 4 hours PRN Dyspepsia  melatonin 3 milliGRAM(s) Oral at bedtime PRN Insomnia  ondansetron Injectable 4 milliGRAM(s) IV Push every 8 hours PRN Nausea and/or Vomiting    Allergies    penicillin (Rash)  Eliquis (Unknown)    Intolerances      Social: no smoking, no alcohol, no illegal drugs; no recent travel, no exposure to TB  FAMILY HISTORY:  CAD (coronary artery disease) (Father, Mother)    Family history of breast cancer in sister (Sibling)      no history of premature cardiovascular disease in first degree relatives    ROS: the patient denies fever, no chills, no HA, no seizures, no dizziness, no sore throat, no nasal congestion, no blurry vision, no CP, no palpitations, no SOB, no cough, no abdominal pain, no diarrhea, no N/V, no dysuria, no leg pain, no claudication, has right foot pain and rash, no joint aches, no rectal pain or bleeding, no night sweats  All other systems reviewed and are negative    Vital Signs Last 24 Hrs  T(C): 36.6 (23 Dec 2024 07:29), Max: 37.1 (22 Dec 2024 22:11)  T(F): 97.9 (23 Dec 2024 07:29), Max: 98.8 (22 Dec 2024 22:11)  HR: 60 (23 Dec 2024 07:) (60 - 76)  BP: 113/50 (23 Dec 2024 07:) (113/50 - 154/58)  BP(mean): 85 (23 Dec 2024 02:23) (85 - 87)  RR: 17 (23 Dec 2024 07:) (17 - 18)  SpO2: 98% (23 Dec 2024 07:) (97% - 99%)    Parameters below as of 23 Dec 2024 07:29  Patient On (Oxygen Delivery Method): room air      Daily Height in cm: 160.02 (22 Dec 2024 22:10)    Daily Weight in k.5 (23 Dec 2024 06:39)    PE:    Constitutional:  No acute distress  HEENT: NC/AT, EOMI, PERRLA, conjunctivae clear; ears and nose atraumatic; pharynx benign  Neck: supple; thyroid not palpable  Back: no tenderness  Respiratory: respiratory effort normal; clear to auscultation  Cardiovascular: S1S2 regular, no murmurs  Abdomen: soft, not tender, not distended, positive BS; no liver or spleen organomegaly  Genitourinary: no suprapubic tenderness  Lymphatic: no LN palpable  Musculoskeletal: no muscle tenderness, no joint swelling or tenderness  Extremities: no pedal edema  Right foot erythema, edema, warmth and tenderness  Right great toe ulcer s/p I and D  Neurological/ Psychiatric: alert, judgement and insight impaired; moving all extremities  Skin: no rashes; no palpable lesions    Labs: all available labs reviewed                        12.8   8.86  )-----------( 298      ( 23 Dec 2024 07:50 )             42.5     12-    140  |  112[H]  |  53[H]  ----------------------------<  94  4.0   |  24  |  1.06    Ca    8.9      23 Dec 2024 07:50  Phos  3.2       Mg     2.4         TPro  6.3  /  Alb  3.1[L]  /  TBili  0.5  /  DBili  x   /  AST  25  /  ALT  14  /  AlkPhos  130[H]       LIVER FUNCTIONS - ( 23 Dec 2024 07:50 )  Alb: 3.1 g/dL / Pro: 6.3 gm/dL / ALK PHOS: 130 U/L / ALT: 14 U/L / AST: 25 U/L / GGT: x           Radiology: all available radiological tests reviewed    < from: Xray Chest 1 View- PORTABLE-Urgent (Xray Chest 1 View- PORTABLE-Urgent .) (24 @ 02:55) >  No focal parenchymal opacities, pleural effusions, or pneumothorax.  < end of copied text >    < from: US Duplex Venous Lower Ext Ltd, Right (24 @ 00:14) >  No evidence of right lower extremity deep venous thrombosis.  < end of copied text >    Advanced directives addressed: full resuscitation

## 2024-12-23 NOTE — H&P ADULT - NSHPLABSRESULTS_GEN_ALL_CORE
LABS:  cret                        13.3   10.70 )-----------( 321      ( 23 Dec 2024 00:30 )             43.9     12-23    140  |  111[H]  |  60[H]  ----------------------------<  118[H]  4.6   |  26  |  1.23    Ca    9.6      23 Dec 2024 00:30    TPro  6.8  /  Alb  3.2[L]  /  TBili  0.6  /  DBili  x   /  AST  32  /  ALT  21  /  AlkPhos  140[H]  12-23      < from: US Duplex Arterial Lower Ext Ltd, Right (12.23.24 @ 00:17) >    IMPRESSION:  Patent arterial vessels of the right lower extremity.    --- End of Report ---    < end of copied text >    < from:  Duplex Venous Lower Ext Ltd, Right (12.23.24 @ 00:14) >      IMPRESSION:  No evidence of right lower extremity deep venous thrombosis.    --- End of Report ---    < end of copied text >

## 2024-12-23 NOTE — DIETITIAN INITIAL EVALUATION ADULT - ORAL INTAKE PTA/DIET HISTORY
Pt and dtr present at time of RD visit. Pt lives at home with dtr who does the food shopping and cooking. Pt dtr reports appetite have improved since last admit. Pt will usually have an ensure shake for breakfast and then meals for lunch and dinner. Pt with osteonecrosis of jaw so requires softer foods cut up (pancakes, tuna salad/chicken salad - small pieces on soft bread no crust, peanut butter and jelly sandwich, etc). Pt and dtr know what foods can and cannot be tolerated. Pt likely meeting <75% ENN chronically.

## 2024-12-23 NOTE — ED ADULT NURSE NOTE - BREATHING, MLM

## 2024-12-23 NOTE — H&P ADULT - PROBLEM SELECTOR PLAN 1
Will give vancomycin one time dose and ceftriaxone.   ID consult for continuation of antibiotics.   Podiatry consult.

## 2024-12-23 NOTE — H&P ADULT - PROBLEM SELECTOR PLAN 2
Pt appears dry on exam.   PTA - lasix 20 mg qd - hold for now  Obtain CXR   Daily weight and strict ins and outs.

## 2024-12-23 NOTE — ED PROVIDER NOTE - OBJECTIVE STATEMENT
89-year-old female with history of CAD, CHF, sick sinus syndrome, pacemaker, hyperlipidemia, A-fib on Xarelto presents to the ER complaining of right foot pain.  As per patient and daughter.  Foot pain and redness began acutely today.  Daughter attempted to bandage but noticed that there was pus coming from the foot when she pressed on the foot.  Patient denies fever, chills, loss of sensation, cyanosis.

## 2024-12-23 NOTE — H&P ADULT - NSHPPHYSICALEXAM_GEN_ALL_CORE
T(C): 36.5 (12-23-24 @ 02:23), Max: 37.1 (12-22-24 @ 22:11)  HR: 63 (12-23-24 @ 02:23) (63 - 76)  BP: 147/66 (12-23-24 @ 02:23) (147/66 - 154/58)  RR: 17 (12-23-24 @ 02:23) (17 - 18)  SpO2: 99% (12-23-24 @ 02:23) (97% - 99%)    General: Frail  Cardio: s1s2 (+) systolic murmur  Lungs: comfortable breathing, clear to auscultation  Abdomen: Soft, non-tender, non-distended  Neuro: AOx4  Ext: Pulses +2, RLE erythema, 1st toe with purulence.

## 2024-12-23 NOTE — DIETITIAN INITIAL EVALUATION ADULT - PERTINENT LABORATORY DATA
12-23    140  |  112[H]  |  53[H]  ----------------------------<  94  4.0   |  24  |  1.06    Ca    8.9      23 Dec 2024 07:50  Phos  3.2     12-23  Mg     2.4     12-23    TPro  6.3  /  Alb  3.1[L]  /  TBili  0.5  /  DBili  x   /  AST  25  /  ALT  14  /  AlkPhos  130[H]  12-23

## 2024-12-23 NOTE — ED ADULT NURSE NOTE - OBJECTIVE STATEMENT
The pt is a 89 y/o female A&Ox4 presenting to the ED c/o L toe pain. Pt daughter at bedside that "her mother called her into her room tonight to look at her right foot, and noted that right foot appears red and more swollen than usual according to the daughter." Wound noted below the right 1st toe nail that has yellow drainage. Right toe is warm to the touch. +blood thinner. Respirations even and unlabored, no distress noted.

## 2024-12-24 ENCOUNTER — TRANSCRIPTION ENCOUNTER (OUTPATIENT)
Age: 88
End: 2024-12-24

## 2024-12-24 LAB
ANION GAP SERPL CALC-SCNC: 7 MMOL/L — SIGNIFICANT CHANGE UP (ref 5–17)
BUN SERPL-MCNC: 64 MG/DL — HIGH (ref 7–23)
CALCIUM SERPL-MCNC: 9.1 MG/DL — SIGNIFICANT CHANGE UP (ref 8.5–10.1)
CHLORIDE SERPL-SCNC: 111 MMOL/L — HIGH (ref 96–108)
CO2 SERPL-SCNC: 23 MMOL/L — SIGNIFICANT CHANGE UP (ref 22–31)
CREAT SERPL-MCNC: 1.34 MG/DL — HIGH (ref 0.5–1.3)
EGFR: 38 ML/MIN/1.73M2 — LOW
GLUCOSE SERPL-MCNC: 107 MG/DL — HIGH (ref 70–99)
HCT VFR BLD CALC: 38.1 % — SIGNIFICANT CHANGE UP (ref 34.5–45)
HGB BLD-MCNC: 11.6 G/DL — SIGNIFICANT CHANGE UP (ref 11.5–15.5)
MCHC RBC-ENTMCNC: 27.1 PG — SIGNIFICANT CHANGE UP (ref 27–34)
MCHC RBC-ENTMCNC: 30.4 G/DL — LOW (ref 32–36)
MCV RBC AUTO: 89 FL — SIGNIFICANT CHANGE UP (ref 80–100)
PLATELET # BLD AUTO: 308 K/UL — SIGNIFICANT CHANGE UP (ref 150–400)
POTASSIUM SERPL-MCNC: 4.1 MMOL/L — SIGNIFICANT CHANGE UP (ref 3.5–5.3)
POTASSIUM SERPL-SCNC: 4.1 MMOL/L — SIGNIFICANT CHANGE UP (ref 3.5–5.3)
RBC # BLD: 4.28 M/UL — SIGNIFICANT CHANGE UP (ref 3.8–5.2)
RBC # FLD: 21.5 % — HIGH (ref 10.3–14.5)
SODIUM SERPL-SCNC: 141 MMOL/L — SIGNIFICANT CHANGE UP (ref 135–145)
WBC # BLD: 9.11 K/UL — SIGNIFICANT CHANGE UP (ref 3.8–10.5)
WBC # FLD AUTO: 9.11 K/UL — SIGNIFICANT CHANGE UP (ref 3.8–10.5)

## 2024-12-24 PROCEDURE — 99233 SBSQ HOSP IP/OBS HIGH 50: CPT

## 2024-12-24 PROCEDURE — 99232 SBSQ HOSP IP/OBS MODERATE 35: CPT

## 2024-12-24 RX ORDER — FUROSEMIDE 20 MG
20 TABLET ORAL DAILY
Refills: 0 | Status: DISCONTINUED | OUTPATIENT
Start: 2024-12-24 | End: 2024-12-25

## 2024-12-24 RX ADMIN — Medication 1 APPLICATION(S): at 21:21

## 2024-12-24 RX ADMIN — Medication 20 MILLIGRAM(S): at 16:09

## 2024-12-24 RX ADMIN — CEFTRIAXONE SODIUM 1000 MILLIGRAM(S): 1 INJECTION, POWDER, FOR SOLUTION INTRAMUSCULAR; INTRAVENOUS at 02:40

## 2024-12-24 RX ADMIN — Medication 1 TABLET(S): at 09:26

## 2024-12-24 RX ADMIN — MONTELUKAST SODIUM 10 MILLIGRAM(S): 10 TABLET, FILM COATED ORAL at 09:25

## 2024-12-24 RX ADMIN — RIVAROXABAN 15 MILLIGRAM(S): 2.5 TABLET, FILM COATED ORAL at 17:01

## 2024-12-24 RX ADMIN — ALLOPURINOL 100 MILLIGRAM(S): 100 TABLET ORAL at 21:21

## 2024-12-24 RX ADMIN — ALLOPURINOL 100 MILLIGRAM(S): 100 TABLET ORAL at 09:26

## 2024-12-24 RX ADMIN — Medication 1 MILLIGRAM(S): at 09:25

## 2024-12-24 RX ADMIN — VANCOMYCIN HYDROCHLORIDE 100 MILLIGRAM(S): 5 INJECTION, POWDER, LYOPHILIZED, FOR SOLUTION INTRAVENOUS at 17:01

## 2024-12-24 RX ADMIN — VANCOMYCIN HYDROCHLORIDE 100 MILLIGRAM(S): 5 INJECTION, POWDER, LYOPHILIZED, FOR SOLUTION INTRAVENOUS at 05:59

## 2024-12-24 RX ADMIN — ATORVASTATIN CALCIUM 10 MILLIGRAM(S): 40 TABLET, FILM COATED ORAL at 21:21

## 2024-12-24 RX ADMIN — Medication 1 APPLICATION(S): at 09:25

## 2024-12-24 NOTE — DISCHARGE NOTE NURSING/CASE MANAGEMENT/SOCIAL WORK - PATIENT PORTAL LINK FT
You can access the FollowMyHealth Patient Portal offered by [facility] by registering at the following website: [email or web address]. By joining "[employer]’s FollowMyHealth portal, you will also be able to view your health information using other applications (apps) compatible with our system.

## 2024-12-24 NOTE — PROVIDER CONTACT NOTE (CRITICAL VALUE NOTIFICATION) - TEST AND RESULT REPORTED:
Abscess culture from 12/23 Gram stain + rare polymorphonuclear leukocytes per low power field AND few gram positive cocci in pairs per oil power field

## 2024-12-24 NOTE — DISCHARGE NOTE NURSING/CASE MANAGEMENT/SOCIAL WORK - NSDCPEFALRISK_GEN_ALL_CORE
For information on Fall & Injury Prevention, visit: [email or web address] OR  [email or web address] OR  https://www.cdc.gov/steadi/patient.html

## 2024-12-24 NOTE — DISCHARGE NOTE NURSING/CASE MANAGEMENT/SOCIAL WORK - FINANCIAL ASSISTANCE
[facility] provides services at a reduced cost to those who are determined to be eligible through [facility]’s financial assistance program. Information regarding [facility]’s financial assistance program can be found by going to [email or web address] or by calling [phone].

## 2024-12-24 NOTE — DISCHARGE NOTE NURSING/CASE MANAGEMENT/SOCIAL WORK - NSSCNAMETXT_GEN_ALL_CORE
pt seen bedside alert and oriented to self; place with choice. pt responded to simple yes/no questions for assessment, and she verbalized wants. pt did not follow directions for oral mech exam but able to participate in feeding task. noted fair speech intelligibility and decreased volume control. pt seen bedside alert and oriented to self; place with choice. pt responded to simple yes/no questions for assessment, and she verbalized wants. pt did [facility] follow [facility] but able to participate in feeding task. noted fair speech intelligibility for short utterances decreased coordination phonation/respiration, imprecise articulation and poor volume control. Visiting nurse of [place]

## 2024-12-25 ENCOUNTER — TRANSCRIPTION ENCOUNTER (OUTPATIENT)
Age: 88
End: 2024-12-25

## 2024-12-25 VITALS
HEART RATE: 59 BPM | DIASTOLIC BLOOD PRESSURE: 51 MMHG | OXYGEN SATURATION: 97 % | RESPIRATION RATE: 18 BRPM | TEMPERATURE: 99 F | SYSTOLIC BLOOD PRESSURE: 114 MMHG

## 2024-12-25 LAB
-  CLINDAMYCIN: SIGNIFICANT CHANGE UP
-  ERYTHROMYCIN: SIGNIFICANT CHANGE UP
-  GENTAMICIN: SIGNIFICANT CHANGE UP
-  OXACILLIN: SIGNIFICANT CHANGE UP
-  PENICILLIN: SIGNIFICANT CHANGE UP
-  RIFAMPIN: SIGNIFICANT CHANGE UP
-  TETRACYCLINE: SIGNIFICANT CHANGE UP
-  TRIMETHOPRIM/SULFAMETHOXAZOLE: SIGNIFICANT CHANGE UP
-  VANCOMYCIN: SIGNIFICANT CHANGE UP
METHOD TYPE: SIGNIFICANT CHANGE UP

## 2024-12-25 PROCEDURE — 99239 HOSP IP/OBS DSCHRG MGMT >30: CPT

## 2024-12-25 RX ORDER — DOXYCYCLINE MONOHYDRATE 100 MG
1 TABLET ORAL
Qty: 60 | Refills: 0
Start: 2024-12-25 | End: 2025-01-23

## 2024-12-25 RX ADMIN — MONTELUKAST SODIUM 10 MILLIGRAM(S): 10 TABLET, FILM COATED ORAL at 09:27

## 2024-12-25 RX ADMIN — CEFTRIAXONE SODIUM 1000 MILLIGRAM(S): 1 INJECTION, POWDER, FOR SOLUTION INTRAMUSCULAR; INTRAVENOUS at 02:41

## 2024-12-25 RX ADMIN — Medication 1 TABLET(S): at 09:27

## 2024-12-25 RX ADMIN — VANCOMYCIN HYDROCHLORIDE 100 MILLIGRAM(S): 5 INJECTION, POWDER, LYOPHILIZED, FOR SOLUTION INTRAVENOUS at 06:05

## 2024-12-25 RX ADMIN — Medication 1 APPLICATION(S): at 09:29

## 2024-12-25 RX ADMIN — ALLOPURINOL 100 MILLIGRAM(S): 100 TABLET ORAL at 09:28

## 2024-12-25 RX ADMIN — Medication 1 MILLIGRAM(S): at 09:28

## 2024-12-25 NOTE — PROGRESS NOTE ADULT - ASSESSMENT
· Assessment	  89F admitted to  for purulent cellulitis of RLE. .      #Cellulitis.   -ct abx    # Chronic heart failure with preserved ejection fraction (HFpEF). .   - lasix 20 mg qd - hold for now-might resume it tomorrow     $ Paroxysmal atrial fibrillation.   ·  Plan: -C/w Xarelto.    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    # Gout.   - Allopurinol.          
 Assessment	  89F admitted to  for purulent cellulitis of RLE. .      #Cellulitis.   -ct abx    # Chronic heart failure with preserved ejection fraction (HFpEF). .   - lasix 20 mg qd     $ Paroxysmal atrial fibrillation.   ·  Plan: -C/w Xarelto.    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    # Gout.   - Allopurinol.          
89-year-old female with h/o HFpEF, CAD, Afib (on Warfarin and recently switched to DOAC), SSS s/p PPM, DLD, HTN was admitted on 12/23 for foot pain, erythema and warmth of right foot for 2 days. Daughter at bedside who is also the caretaker reports she noticed the right 1st toe had a wound 2 days ago, developed swelling and erythema rapidly and worsened today with purulent discharge. Patient has had chills, generalized "shaking" and fatigue. In ED patient received ceftriaxone, clindamycin and vancomycin IV.     #Right foot cellulitis with STAU  #Right great toe ulcer  #B/l lower extremities stasis dermatitis. Probable underlying PVD.  #Allergy to PCN  -BC x 2 noted   -wound culture noted   -on vancomycin 500 mg IV q12h and ceftriaxone 1 gm IV qd # 3  -tolerating abx well so far; no side effects noted  -renal function is slightly worse  -d/c vancomycin and d/c ceftriaxone  -wound care  -change abx to doxycycline 100 mg PO q12h for 4 more weeks  -reason for abx use and side effects reviewed with patient; monitor BMP   -f/u with podiatry as outpatient   -f/u cultures  -monitor temps  -f/u CBC  -supportive care  2. Other issues:   -care per medicine    Clinical team may change from intravenous to oral antibiotics when the following criteria are met:   1. Patient is clinically improving/stable       a)	Improved signs and symptoms of infection from initial presentation       b)	Afebrile for 24 hours       c)	Leukocytosis trending towards normal range   2. Patient is tolerating oral intake   3. Initial/repeat blood cultures are negative     When above criteria met may change iv antibiotics to an oral doxy    
A: 89-year-old Female seen for the followin. Cellulitis to Right Lower extremity secondary to underlying wound extending to nail eponychium      P:   Chart reviewed and Patient evaluated;  Discussed diagnosis and treatment with patient.   X-rays reviewed: on wet reading showing no soft tissue emphysema, noted diffuse osteopenia  Pt has a full thickness small wound with improved periwound erythema. no drainage or malodor noted today; wound doesnt probe to bone, appears superficial and caused due to digging of the loose thickened nail into nail bed.  Wound cx right hallux obtained   Applied xeroform with dry sterile dressing  WBAT  to the right foot in dispensed surgical shoe  Continue antibiotics as per ID  No acute podiatric surgical intervention warranted at this time. Pt to follow up with Dr Ned Lozano at his office next week outpt upon discharge  All additional care per Med appreciated  Patient demonstrated verbal understanding of all interventions and tolerated interventions well without any complications.   Podiatry will follow while in house    Case D/W attending Dr. Lozano    Wound care instructions to be applied every Mon, Wed, Fri to the right big toe:  - Please apply bacitracin to the right big toe nail bed,  - Cover with gauze, cornelia and secure with tape.  Thank you
89-year-old female with h/o HFpEF, CAD, Afib (on Warfarin and recently switched to DOAC), SSS s/p PPM, DLD, HTN was admitted on 12/23 for foot pain, erythema and warmth of right foot for 2 days. Daughter at bedside who is also the caretaker reports she noticed the right 1st toe had a wound 2 days ago, developed swelling and erythema rapidly and worsened today with purulent discharge. Patient has had chills, generalized "shaking" and fatigue. In ED patient received ceftriaxone, clindamycin and vancomycin IV.     #Right foot cellulitis with GPC in pairs  #Right great toe ulcer  #B/l lower extremities stasis dermatitis. Probable underlying PVD.  #Allergy to PCN  -obtain BC x 2  -wound gram stain - GPC in pairs  -on vancomycin 500 mg IV q12h and ceftriaxone 1 gm IV qd # 2  -tolerating abx well so far; no side effects noted  -obtain vancomycin trough level   -wound care  -continue abx coverage   -monitor temps  -f/u CBC  -supportive care  2. Other issues:   -care per medicine    Clinical team may change from intravenous to oral antibiotics when the following criteria are met:   1. Patient is clinically improving/stable       a)	Improved signs and symptoms of infection from initial presentation       b)	Afebrile for 24 hours       c)	Leukocytosis trending towards normal range   2. Patient is tolerating oral intake   3. Initial/repeat blood cultures are negative     When above criteria met may change iv antibiotics to an oral agent  Cannot advise changing to oral antibiotic therapy until culture sensitivity is available.

## 2024-12-25 NOTE — DISCHARGE NOTE PROVIDER - CARE PROVIDERS DIRECT ADDRESSES
,DirectAddress_Unknown,[email or web address]

## 2024-12-25 NOTE — PROGRESS NOTE ADULT - REASON FOR ADMISSION
Nephrology clinic follow up visit note    Chief complaint: CKD stage IIIbA2    HPI: 89-year-old male with past medical history of type 2 diabetes mellitus with neuropathy, hypertension, CKD stage IIIb with baseline creatinine between 1.3-1.5 now came to follow up in the CKD clinic.     Interval history: Doing well.   He denies any NSAID use.  She denies any nausea, vomiting, chest pain, fevers, shortness of breath, lower extremity edema, dysuria or hematuria.    Review of systems:   Constitutional:  Negative except as documented in history of present illness.    Eye:  Negative except as documented in history of present illness.    Ear/Nose/Mouth/Throat:  Negative except as documented in history of present illness.    Cardiovascular:  Negative except as documented in history of present illness.    Respiratory:  Negative except as documented in history of present illness.    Gastrointestinal:  Negative except as documented in history of present illness.    Genitourinary:  Negative except as documented in history of present illness.    Musculoskeletal:  Negative except as documented in history of present illness.    Integumentary:  Negative except as documented in history of present illness.    Hematology/Lymphatics:  Negative except as documented in history of present illness.    Neurologic:  Negative except as documented in history of present illness.    Endocrine:  Negative except as documented in history of present illness.    Allergy/Immunologic:  Negative except as documented in history of present illness.    Psychiatric:  Negative except as documented in history of present illness.    All other systems ROS reviewed as documented in chart .         Past medical history: Type II diabetes mellitus, cataract, arthritis, hypertension.  Past surgical history: Catheter extraction bilaterally, lumbar disc surgery  Social history: No known history of smoking alcohol drug abuse.  Allergic history: Allergic to codeine and 
lisinopril.  Family history: Not known.    Current medications:   Meclizine 25 mg 3 times a day as needed for dizziness  Tamsulosin 0.4 mg 2 capsules at bedtime  Jardiance 10 mg once daily   Glipizide 10 mg once daily  Metformin 1 g twice daily.  Aricept 10 mg once daily  Atorvastatin 40 mg daily  Trulicity as directed once per week.  Triamterene-hydrochlorothiazide combination 37.5-25 mg once daily  Metoprolol XL 50 mg once daily  Diltiazem ER 300mg po daily      Physical exam:   /73mmHg, Pulse 73/min, Temp. 96.1degree F, RR 16/min  In no acute distress  HEENT: No icterus, no pallor, no scars lesions masses noted  Neck is supple, no JVD  Lungs: CTA B, no wheezes, rhonchi, crackles heard  CVS: S1-S2 positive  Abdomen: Soft, nontender, bowel sounds positive  Extremities: No edema, cyanosis or clubbing noted  Integumentary: No LE edema  Neurologically: No gross neurologic deficit  Psychiatry: Appropriate mood and effect.       Labs reviewed          Assessment and plan: 89-year-old male with past medical history of type 2 diabetes mellitus with neuropathy, hypertension, CKD stage IIIb with baseline creatinine between 1.3-1.5 now came to establish care in the CKD clinic.      #CKD stage IIIbA2:  Baseline creatinine 1.3-1.5  Last serum creatinine 1.6<1.8  EGFR 41 mill per minute per 1.73 m2.  CKD likely depends on longstanding hypertension and diabetes.  Recommend optimal glycemic and blood pressure control.  Last urine albumin creatinine ratio of 36 mg/g.  Currently on no ACE inhibitor or ARB.  Pending renal ultrasound to get baseline renal anatomy.    # ?UTI:  Empirically given 7 days course of keflex.  Sending urine culture as well.    #Essential hypertension:  Blood pressure in acceptable range.  Currently on metoprolol XL 50 mg once daily, triamterene hydrochlorothiazide combination 37.525 mg once daily.  Will monitor trend and suggest changes accordingly.    #Type 2 diabetes mellitus:  Last HbA1c 9.3  Urine 
protein creatinine ratio of 36 mg/g.  Glycemic control per PCP.  Considering his age and comorbidities, not sure how much benefit she will have while on ACE/ARB. Will reassess next visit.  Will continue to monitor microalbuminuria.    #Anemia of CKD:  Last hemoglobin 13.6<10.5  Serum ferritin less than 30, percent iron saturation more than 20.  Continue to monitor.    #Bone and mineral disease of CKD:  Calcium 9.5, Phosphorus 3.8  PTH 62, 25(OH) vitamin D level >30ng/ml.      Primary CARE physician: Marlena Méndez MD    Return to clinic in 2 months with above labs and imaging.    Thank you for allowing me to take part in the care if this patient. Will follow along with you.  Please call with questions if any on .  MIPS: A) MIPS  : Most recent systolic blood pressure <140 mmHmmHg  : Most recent diastolic blood pressure < 90 mmHmmHg  : I ATTEST AND DOCUMENTED CURRENT MEDICATIONS IN THE MEDICAL RECORDS, UPDATED, OR REVIEWED THE PATIENT’S CURRENT MEDICATIONS.  1036F: Current tobacco non-user non smoker  Medications reviewed  BMI normal.    [facility]  [facility], [place], [facility]   Professional Building   [address], [address]  [place] [address]  Contact: [phone], Fax: [phone]  Perfect Serve: Chaparrita Ferrera.       The 21st Century Cures Act makes medical notes like these available to patients in the interest of transparency. Please be advised that this is a medical document. Medical documents are intended to carry relevant information and the clinical opinion of the practitioner. The medical note is intended as medical provider to provider communication, and may appear blunt or direct. It is written in medical language, and may contain abbreviations or verbiage that are unfamiliar.   
right leg erythema
right leg erythema

## 2024-12-25 NOTE — DISCHARGE NOTE PROVIDER - CARE PROVIDER_API CALL
Andie Elizabeth  Podiatry  Phone: ([phone]  Fax: ()-  Follow Up Time: 1 week    Ray Mathias  Internal Medicine  [address] [place] [address]  Phone: [phone]  Fax: [phone]  Follow Up Time: 1 week

## 2024-12-25 NOTE — DISCHARGE NOTE PROVIDER - HOSPITAL COURSE
89-year-old female with PMH of HFpEF, CAD, Afib (on Warfarin and recently switched to DOAC), SSS s/p PPM, DLD, HTN presents with complaints of foot pain, erythema and warmth of right foot for 2 days.    #Diagnosed with cellulitis and possible deep soft tissue infection, was given abx. At this point she looks better and is being discharged with further management as an outpt.     #Wound care instructions to be applied every Mon, Wed, Fri to the right big toe:  - Please apply bacitracin to the right big toe nail bed,  - Cover with gauze, cornelia and secure with tape.    #D/C plan discussed with ID- DR. Dasilva         Vital Signs Last 24 Hrs  T(C): 36.9 (24 Dec 2024 23:42), Max: 37.1 (24 Dec 2024 21:18)  T(F): 98.4 (24 Dec 2024 23:42), Max: 98.8 (24 Dec 2024 21:18)  HR: 60 (24 Dec 2024 23:42) (59 - 60)  BP: 113/47 (24 Dec 2024 23:42) (110/52 - 121/45)  BP(mean): --  RR: 18 (24 Dec 2024 23:42) (18 - 18)  SpO2: 95% (24 Dec 2024 23:42) (95% - 98%)    Parameters below as of 24 Dec 2024 23:42  Patient On (Oxygen Delivery Method): room air        General: comfortable   Head- Atraumatic, normocephalic   Neurology: A&Ox3, nonfocal, CN II to XII intact, power intact 5/5 in all muscle group  HEENT- PERRLA, moist mucous membrane  Neck-supple, no JVD  Respiratory: Air entry equal b/l, CTA   CVS:  S1S2, no murmurs, rubs or gallops  Abdominal: Soft, NT, ND +BS,   Genitourinary- voiding, non palpable bladder  Extremities: No edema, + peripheral pulses  Skin- better in appearance now   Psychiatric- mood stable   LN- no lymphadenopathy         12-25-24 @ 08:00          Pt has been managed here symptomatically, currently  hemodynamically stable and is being discharged with further management as an outpt, time spent 45 minutes

## 2024-12-25 NOTE — DISCHARGE NOTE PROVIDER - NSDCCPCAREPLAN_GEN_ALL_CORE_FT
PRINCIPAL DISCHARGE DIAGNOSIS  Diagnosis: Cellulitis  Assessment and Plan of Treatment: you probably also has deep soft tissue infection, take your antibiotics for a month and during this period follow up with pmd and podiatry for monitoring, please call podiatry or come to er if redness worsens, fever develops or push discharge happens  wound care instruction to be applied every monday, Wed, Friday to the right bid toe  -please apply bacitracin to the right big toe nail bed   cover with gauze cornelia and secure with tape

## 2024-12-25 NOTE — PROGRESS NOTE ADULT - SUBJECTIVE AND OBJECTIVE BOX
Date of service: 12-24-24 @ 10:24    Lying in bed in NAD  Has right foot local pain  Has foot erythema  Low grade fever  New wound culture reported    ROS: no fever or chills; denies dizziness, no HA, no SOB or cough, no abdominal pain, no diarrhea or constipation; no dysuria, no legs pain, no rashes    MEDICATIONS  (STANDING):  allopurinol 100 milliGRAM(s) Oral two times a day  ammonium lactate 12% Lotion 1 Application(s) Topical every 12 hours  atorvastatin 10 milliGRAM(s) Oral at bedtime  cefTRIAXone Injectable. 1000 milliGRAM(s) IV Push every 24 hours  folic acid 1 milliGRAM(s) Oral daily  influenza  Vaccine (HIGH DOSE) 0.5 milliLiter(s) IntraMuscular once  metoprolol succinate ER 50 milliGRAM(s) Oral at bedtime  montelukast 10 milliGRAM(s) Oral daily  multivitamin 1 Tablet(s) Oral daily  rivaroxaban 15 milliGRAM(s) Oral with dinner  vancomycin  IVPB 500 milliGRAM(s) IV Intermittent every 12 hours    Vital Signs Last 24 Hrs  T(C): 36.7 (24 Dec 2024 07:28), Max: 38 (23 Dec 2024 14:43)  T(F): 98.1 (24 Dec 2024 07:28), Max: 100.4 (23 Dec 2024 14:43)  HR: 61 (24 Dec 2024 07:28) (59 - 61)  BP: 102/50 (24 Dec 2024 07:28) (101/49 - 104/42)  BP(mean): --  RR: 18 (24 Dec 2024 07:28) (17 - 18)  SpO2: 93% (24 Dec 2024 07:28) (93% - 97%)    Parameters below as of 24 Dec 2024 07:28  Patient On (Oxygen Delivery Method): room air     Physical exam:    Constitutional:  No acute distress  HEENT: NC/AT, EOMI, PERRLA, conjunctivae clear; ears and nose atraumatic; pharynx benign  Neck: supple; thyroid not palpable  Back: no tenderness  Respiratory: respiratory effort normal; clear to auscultation  Cardiovascular: S1S2 regular, no murmurs  Abdomen: soft, not tender, not distended, positive BS; no liver or spleen organomegaly  Genitourinary: no suprapubic tenderness  Lymphatic: no LN palpable  Musculoskeletal: no muscle tenderness, no joint swelling or tenderness  Extremities: no pedal edema  Right foot dorsal aspect erythema, edema, warmth and tenderness  Right great toe ulcer s/p I and D  Neurological/ Psychiatric: alert, judgement and insight impaired; moving all extremities  Skin: no rashes; no palpable lesions    Labs: all available labs reviewed                        12.8   8.86  )-----------( 298      ( 23 Dec 2024 07:50 )             42.5     12-23    140  |  112[H]  |  53[H]  ----------------------------<  94  4.0   |  24  |  1.06    Ca    8.9      23 Dec 2024 07:50  Phos  3.2     12-23  Mg     2.4     12-23    TPro  6.3  /  Alb  3.1[L]  /  TBili  0.5  /  DBili  x   /  AST  25  /  ALT  14  /  AlkPhos  130[H]  12-23     LIVER FUNCTIONS - ( 23 Dec 2024 07:50 )  Alb: 3.1 g/dL / Pro: 6.3 gm/dL / ALK PHOS: 130 U/L / ALT: 14 U/L / AST: 25 U/L / GGT: x           Culture - Abscess with Gram Stain (collected 23 Dec 2024 08:30)  Source: .Abscess  Gram Stain (23 Dec 2024 23:01):    Rare polymorphonuclear leukocytes per low power field    Few Gram positive cocci in pairs per oil power field    Culture - Blood (collected 23 Dec 2024 01:38)  Source: .Blood BLOOD  Preliminary Report (24 Dec 2024 10:01):    No growth at 24 hours    Culture - Blood (collected 23 Dec 2024 00:30)  Source: .Blood BLOOD  Preliminary Report (24 Dec 2024 05:01):    No growth at 24 hours    Radiology: all available radiological tests reviewed    < from: Xray Chest 1 View- PORTABLE-Urgent (Xray Chest 1 View- PORTABLE-Urgent .) (12.23.24 @ 02:55) >  No focal parenchymal opacities, pleural effusions, or pneumothorax.  < end of copied text >    < from: US Duplex Venous Lower Ext Ltd, Right (12.23.24 @ 00:14) >  No evidence of right lower extremity deep venous thrombosis.  < end of copied text >    Advanced directives addressed: full resuscitation
Date of service: 12-25-24 @ 08:24    Lying in bed in NAD  Weak looking  Has right foot swelling  Denies pain  New cultures reported     ROS: no fever or chills; denies dizziness, no HA, no SOB or cough, no abdominal pain, no diarrhea or constipation; no dysuria, no legs pain, no rashes    MEDICATIONS  (STANDING):  allopurinol 100 milliGRAM(s) Oral two times a day  ammonium lactate 12% Lotion 1 Application(s) Topical every 12 hours  atorvastatin 10 milliGRAM(s) Oral at bedtime  cefTRIAXone Injectable. 1000 milliGRAM(s) IV Push every 24 hours  folic acid 1 milliGRAM(s) Oral daily  influenza  Vaccine (HIGH DOSE) 0.5 milliLiter(s) IntraMuscular once  metoprolol succinate ER 50 milliGRAM(s) Oral at bedtime  montelukast 10 milliGRAM(s) Oral daily  multivitamin 1 Tablet(s) Oral daily  rivaroxaban 15 milliGRAM(s) Oral with dinner  vancomycin  IVPB 500 milliGRAM(s) IV Intermittent every 12 hours    Vital Signs Last 24 Hrs  T(C): 36.9 (24 Dec 2024 23:42), Max: 37.1 (24 Dec 2024 21:18)  T(F): 98.4 (24 Dec 2024 23:42), Max: 98.8 (24 Dec 2024 21:18)  HR: 60 (24 Dec 2024 23:42) (59 - 60)  BP: 113/47 (24 Dec 2024 23:42) (110/52 - 121/45)  BP(mean): --  RR: 18 (24 Dec 2024 23:42) (18 - 18)  SpO2: 95% (24 Dec 2024 23:42) (95% - 98%)    Parameters below as of 24 Dec 2024 23:42  Patient On (Oxygen Delivery Method): room air    Physical exam:    Constitutional:  No acute distress  HEENT: NC/AT, EOMI, PERRLA, conjunctivae clear; ears and nose atraumatic; pharynx benign  Neck: supple; thyroid not palpable  Back: no tenderness  Respiratory: respiratory effort normal; clear to auscultation  Cardiovascular: S1S2 regular, no murmurs  Abdomen: soft, not tender, not distended, positive BS; no liver or spleen organomegaly  Genitourinary: no suprapubic tenderness  Lymphatic: no LN palpable  Musculoskeletal: no muscle tenderness, no joint swelling or tenderness  Extremities: no pedal edema  Right foot dorsal aspect erythema, edema, warmth and tenderness  Right great toe ulcer s/p I and D  Neurological/ Psychiatric: alert, judgement and insight impaired; moving all extremities  Skin: no rashes; no palpable lesions    Labs: all available labs reviewed                        12.8   8.86  )-----------( 298      ( 23 Dec 2024 07:50 )             42.5     12-23    140  |  112[H]  |  53[H]  ----------------------------<  94  4.0   |  24  |  1.06    Ca    8.9      23 Dec 2024 07:50  Phos  3.2     12-23  Mg     2.4     12-23    TPro  6.3  /  Alb  3.1[L]  /  TBili  0.5  /  DBili  x   /  AST  25  /  ALT  14  /  AlkPhos  130[H]  12-23     LIVER FUNCTIONS - ( 23 Dec 2024 07:50 )  Alb: 3.1 g/dL / Pro: 6.3 gm/dL / ALK PHOS: 130 U/L / ALT: 14 U/L / AST: 25 U/L / GGT: x           Culture - Abscess with Gram Stain (collected 23 Dec 2024 08:30)  Source: .Abscess  Gram Stain (23 Dec 2024 23:01):    Rare polymorphonuclear leukocytes per low power field    Few Gram positive cocci in pairs per oil power field  Preliminary Report (24 Dec 2024 12:59):    Moderate Staphylococcus aureus    Culture - Blood (collected 23 Dec 2024 01:38)  Source: .Blood BLOOD  Preliminary Report (24 Dec 2024 10:01):    No growth at 24 hours    Culture - Blood (collected 23 Dec 2024 00:30)  Source: .Blood BLOOD  Preliminary Report (25 Dec 2024 05:01):    No growth at 48 Hours    Radiology: all available radiological tests reviewed    < from: Xray Chest 1 View- PORTABLE-Urgent (Xray Chest 1 View- PORTABLE-Urgent .) (12.23.24 @ 02:55) >  No focal parenchymal opacities, pleural effusions, or pneumothorax.  < end of copied text >    < from: US Duplex Venous Lower Ext Ltd, Right (12.23.24 @ 00:14) >  No evidence of right lower extremity deep venous thrombosis.  < end of copied text >    Advanced directives addressed: full resuscitation
12/24/2024: Pt seen by podiatry team with attending present. Pt resting bedside, NAD.      PMH: HTN (hypertension)    Afib    Sleep apnea    Hyperlipidemia    Pacemaker    SSS (sick sinus syndrome)    Carotid artery disease    Chronic CHF      PSH:Pacemaker        Allergies:penicillin (Rash)  Eliquis (Unknown)      Labs:                        11.6   9.11  )-----------( 308      ( 24 Dec 2024 06:19 )             38.1   12-24    141  |  111[H]  |  64[H]  ----------------------------<  107[H]  4.1   |  23  |  1.34[H]    Ca    9.1      24 Dec 2024 06:19  Phos  3.2     12-23  Mg     2.4     12-23    TPro  6.3  /  Alb  3.1[L]  /  TBili  0.5  /  DBili  x   /  AST  25  /  ALT  14  /  AlkPhos  130[H]  12-23  Vital Signs Last 24 Hrs  T(C): 36.7 (24 Dec 2024 07:28), Max: 38 (23 Dec 2024 14:43)  T(F): 98.1 (24 Dec 2024 07:28), Max: 100.4 (23 Dec 2024 14:43)  HR: 61 (24 Dec 2024 07:28) (59 - 61)  BP: 102/50 (24 Dec 2024 07:28) (101/49 - 104/42)  BP(mean): --  RR: 18 (24 Dec 2024 07:28) (17 - 18)  SpO2: 93% (24 Dec 2024 07:28) (93% - 97%)    Parameters below as of 24 Dec 2024 07:28  Patient On (Oxygen Delivery Method): room air    REVIEW OF SYSTEMS:    CONSTITUTIONAL: No weakness, fevers or chills  EYES: No visual changes  RESPIRATORY: No cough, wheezing; No shortness of breath  CARDIOVASCULAR: No chest pain or palpitations  GASTROINTESTINAL: No abdominal or epigastric pain. No nausea, vomiting; No diarrhea or constipation.   GENITOURINARY: No dysuria, frequency or hematuria  NEUROLOGICAL: No numbness or weakness  SKIN: See physical examination.  All other review of systems is negative unless indicated above    Physical Exam:   Constitutional: NAD, alert;  Lower Extremity Focus  Derm:  Skin warm, dry and supple bilateral.    Right LE: full thickness wound to the right hallux with pocket of pus collection adjacent the hallucal eponychium (improved, noted only upon initial presentation), hallux toenail loose noted digging into the nail bed, upon removal of the nail plate, only sanguinous discharge noted. wound base Granular, + vijaya-wound erythema, - tracking/tunneling, - probe to bone.   Vascular: Dorsalis Pedis and Posterior Tibial pulses weakly palpable.   Neuro: Protective sensation diminished to the level of the digits bilateral.  MSK: Muscle strength 5/5 all major muscle groups bilateral.      < from: Xray Foot AP + Lateral + Oblique, Right (12.23.24 @ 00:35) >    INTERPRETATION:  Soft tissue infection    3 views right foot.    IMPRESSION: No acute fracture or dislocation, focal bone lysis or unusual   periosteal reaction. Diffuse patchy demineralization. Calcaneal   osteophytes. Degenerative change at the first metatarsal phalangeal   joint. Small vessel calcification. Soft tissue swelling over the dorsum   of the foot. No soft tissue gas.    If there is concern for osteomyelitis consider MRI for more sensitive   evaluation    < end of copied text >      
  HPI:  89-year-old female with PMH of HFpEF, CAD, Afib (on Warfarin and recently switched to DOAC), SSS s/p PPM, DLD, HTN presents with complaints of foot pain, erythema and warmth of right foot for 2 days. Daughter at bedside who is also the caretaker reports she noticed the right 1st toe had a wound 2 days ago, developed swelling and erythema rapidly and worsened today with purulent discharge. Patient has had chills, generalized "shaking" and fatigue. No other acute complaints and denies other ROS. In ED patient with stable vitals, CBC noted for WBC 10.7, H/H 13/43, Plt 321. CMP noted for BUN/Cr 60/1.23, CRP 16. US venous and arterial showed patent vessels. Patient admitted to  for purulent cellulitis of RLE.  (23 Dec 2024 01:17)      Review of Systems:  CONSTITUTIONAL: No weakness, fevers or chills  EYES/ENT: No visual changes;  No vertigo or throat pain   NECK: No pain or stiffness  RESPIRATORY: No cough, wheezing, hemoptysis; No shortness of breath,   CARDIOVASCULAR: No chest pain or palpitations  GASTROINTESTINAL: No abdominal or epigastric pain. No nausea, vomiting, or hematemesis; No diarrhea or constipation.   GENITOURINARY: No dysuria, frequency or hematuria  NEUROLOGICAL: No numbness or weakness  SKIN: No itching, burning, rashes, or lesions   All other review of systems is negative unless indicated above    PHYSICAL EXAM:    Vital Signs Last 24 Hrs  T(C): 38 (23 Dec 2024 14:43), Max: 38 (23 Dec 2024 14:43)  T(F): 100.4 (23 Dec 2024 14:43), Max: 100.4 (23 Dec 2024 14:43)  HR: 60 (23 Dec 2024 07:29) (60 - 76)  BP: 113/50 (23 Dec 2024 07:29) (113/50 - 154/58)  BP(mean): 85 (23 Dec 2024 02:23) (85 - 87)  RR: 17 (23 Dec 2024 07:29) (17 - 18)  SpO2: 98% (23 Dec 2024 07:29) (97% - 99%)    Parameters below as of 23 Dec 2024 07:29  Patient On (Oxygen Delivery Method): room air        GENERAL: comfortable   HEAD:  Atraumatic, Normocephalic  EYES: EOMI, PERRLA, conjunctiva and sclera clear  HEENT: Moist mucous membranes  NECK: Supple, No JVD  NERVOUS SYSTEM:  Alert & Oriented X3, Motor Strength 5/5 B/L upper and lower extremities; DTRs 2+ intact and symmetric  CHEST/LUNG: Clear to auscultation bilaterally; No rales, rhonchi, wheezing, or rubs  HEART:S1S2 normal, no murmer  ABDOMEN: Soft, Nontender, Nondistended; Bowel sounds present  GENITOURINARY- Voiding, no palpable bladder  EXTREMITIES:  2+ Peripheral Pulses, No clubbing, cyanosis, or edema  MUSCULOSKELTAL- No muscle tenderness, Muscle tone normal, No joint tenderness, no Joint swelling, Joint range of motion-normal  SKIN-no rash, no lesion  PSYCH- Mood stable  LYMPH Node- No palpable lymph node    LABS:                        12.8   8.86  )-----------( 298      ( 23 Dec 2024 07:50 )             42.5     12-23    140  |  112[H]  |  53[H]  ----------------------------<  94  4.0   |  24  |  1.06    Ca    8.9      23 Dec 2024 07:50  Phos  3.2     12-23  Mg     2.4     12-23    TPro  6.3  /  Alb  3.1[L]  /  TBili  0.5  /  DBili  x   /  AST  25  /  ALT  14  /  AlkPhos  130[H]  12-23      Urinalysis Basic - ( 23 Dec 2024 07:50 )    Color: x / Appearance: x / SG: x / pH: x  Gluc: 94 mg/dL / Ketone: x  / Bili: x / Urobili: x   Blood: x / Protein: x / Nitrite: x   Leuk Esterase: x / RBC: x / WBC x   Sq Epi: x / Non Sq Epi: x / Bacteria: x        CAPILLARY BLOOD GLUCOSE                Standing medicine  acetaminophen     Tablet .. 650 milliGRAM(s) Oral every 6 hours PRN  allopurinol 100 milliGRAM(s) Oral two times a day  aluminum hydroxide/magnesium hydroxide/simethicone Suspension 30 milliLiter(s) Oral every 4 hours PRN  ammonium lactate 12% Lotion 1 Application(s) Topical every 12 hours  atorvastatin 10 milliGRAM(s) Oral at bedtime  cefTRIAXone Injectable. 1000 milliGRAM(s) IV Push every 24 hours  folic acid 1 milliGRAM(s) Oral daily  influenza  Vaccine (HIGH DOSE) 0.5 milliLiter(s) IntraMuscular once  melatonin 3 milliGRAM(s) Oral at bedtime PRN  metoprolol succinate ER 50 milliGRAM(s) Oral at bedtime  montelukast 10 milliGRAM(s) Oral daily  multivitamin 1 Tablet(s) Oral daily  ondansetron Injectable 4 milliGRAM(s) IV Push every 8 hours PRN  rivaroxaban 15 milliGRAM(s) Oral with dinner  vancomycin  IVPB 500 milliGRAM(s) IV Intermittent every 12 hours        # Reviewed today's blood work which inclue CBC, BMP     #Reviewed today's imagine study myself -    #Discussed with other team member- ID- Dr. Dasilva -ct abx    #Discussed with pt in detial.        Total time spent 51 minutes   
  HPI:  89-year-old female with PMH of HFpEF, CAD, Afib (on Warfarin and recently switched to DOAC), SSS s/p PPM, DLD, HTN presents with complaints of foot pain, erythema and warmth of right foot for 2 days. Daughter at bedside who is also the caretaker reports she noticed the right 1st toe had a wound 2 days ago, developed swelling and erythema rapidly and worsened today with purulent discharge. Patient has had chills, generalized "shaking" and fatigue. No other acute complaints and denies other ROS. In ED patient with stable vitals, CBC noted for WBC 10.7, H/H 13/43, Plt 321. CMP noted for BUN/Cr 60/1.23, CRP 16. US venous and arterial showed patent vessels. Patient admitted to  for purulent cellulitis of RLE.  (23 Dec 2024 01:17)      Review of Systems:  CONSTITUTIONAL: No weakness, fevers or chills  EYES/ENT: No visual changes;  No vertigo or throat pain   NECK: No pain or stiffness  RESPIRATORY: No cough, wheezing, hemoptysis; No shortness of breath,   CARDIOVASCULAR: No chest pain or palpitations  GASTROINTESTINAL: No abdominal or epigastric pain. No nausea, vomiting, or hematemesis; No diarrhea or constipation.   GENITOURINARY: No dysuria, frequency or hematuria  NEUROLOGICAL: No numbness or weakness  SKIN: No itching, burning, rashes, or lesions   All other review of systems is negative unless indicated above    PHYSICAL EXAM:  Vital Signs Last 24 Hrs  T(C): 36.7 (24 Dec 2024 07:28), Max: 37.4 (23 Dec 2024 22:05)  T(F): 98.1 (24 Dec 2024 07:28), Max: 99.4 (23 Dec 2024 22:05)  HR: 61 (24 Dec 2024 07:28) (59 - 61)  BP: 102/50 (24 Dec 2024 07:28) (101/49 - 104/42)  BP(mean): --  RR: 18 (24 Dec 2024 07:28) (17 - 18)  SpO2: 93% (24 Dec 2024 07:28) (93% - 95%)    Parameters below as of 24 Dec 2024 07:28  Patient On (Oxygen Delivery Method): room air          GENERAL: comfortable   HEAD:  Atraumatic, Normocephalic  EYES: EOMI, PERRLA, conjunctiva and sclera clear  HEENT: Moist mucous membranes  NECK: Supple, No JVD  NERVOUS SYSTEM:  Alert & Oriented X3, Motor Strength 5/5 B/L upper and lower extremities; DTRs 2+ intact and symmetric  CHEST/LUNG: Clear to auscultation bilaterally; No rales, rhonchi, wheezing, or rubs  HEART:S1S2 normal, no murmer  ABDOMEN: Soft, Nontender, Nondistended; Bowel sounds present  GENITOURINARY- Voiding, no palpable bladder  EXTREMITIES:  2+ Peripheral Pulses, No clubbing, cyanosis, or edema  MUSCULOSKELETAL No muscle tenderness, Muscle tone normal, No joint tenderness, no Joint swelling, Joint range of motion-normal  SKIN-no rash, no lesion  PSYCH- Mood stable  LYMPH Node- No palpable lymph node                          11.6   9.11  )-----------( 308      ( 24 Dec 2024 06:19 )             38.1     12-24    141  |  111[H]  |  64[H]  ----------------------------<  107[H]  4.1   |  23  |  1.34[H]    Ca    9.1      24 Dec 2024 06:19  Phos  3.2     12-23  Mg     2.4     12-23    TPro  6.3  /  Alb  3.1[L]  /  TBili  0.5  /  DBili  x   /  AST  25  /  ALT  14  /  AlkPhos  130[H]  12-23    LIVER FUNCTIONS - ( 23 Dec 2024 07:50 )  Alb: 3.1 g/dL / Pro: 6.3 gm/dL / ALK PHOS: 130 U/L / ALT: 14 U/L / AST: 25 U/L / GGT: x             Culture - Abscess with Gram Stain (collected 23 Dec 2024 08:30)  Source: .Abscess  Gram Stain (23 Dec 2024 23:01):    Rare polymorphonuclear leukocytes per low power field    Few Gram positive cocci in pairs per oil power field  Preliminary Report (24 Dec 2024 12:59):    Moderate Staphylococcus aureus    Culture - Blood (collected 23 Dec 2024 01:38)  Source: .Blood BLOOD  Preliminary Report (24 Dec 2024 10:01):    No growth at 24 hours    Culture - Blood (collected 23 Dec 2024 00:30)  Source: .Blood BLOOD  Preliminary Report (24 Dec 2024 05:01):    No growth at 24 hours        Urinalysis Basic - ( 24 Dec 2024 06:19 )    Color: x / Appearance: x / SG: x / pH: x  Gluc: 107 mg/dL / Ketone: x  / Bili: x / Urobili: x   Blood: x / Protein: x / Nitrite: x   Leuk Esterase: x / RBC: x / WBC x   Sq Epi: x / Non Sq Epi: x / Bacteria: x          CAPILLARY BLOOD GLUCOSE          Culture - Abscess with Gram Stain (collected 23 Dec 2024 08:30)  Source: .Abscess  Gram Stain (23 Dec 2024 23:01):    Rare polymorphonuclear leukocytes per low power field    Few Gram positive cocci in pairs per oil power field  Preliminary Report (24 Dec 2024 12:59):    Moderate Staphylococcus aureus    Culture - Blood (collected 23 Dec 2024 01:38)  Source: .Blood BLOOD  Preliminary Report (24 Dec 2024 10:01):    No growth at 24 hours    Culture - Blood (collected 23 Dec 2024 00:30)  Source: .Blood BLOOD  Preliminary Report (24 Dec 2024 05:01):    No growth at 24 hours      MEDICATIONS  (STANDING):  allopurinol 100 milliGRAM(s) Oral two times a day  ammonium lactate 12% Lotion 1 Application(s) Topical every 12 hours  atorvastatin 10 milliGRAM(s) Oral at bedtime  cefTRIAXone Injectable. 1000 milliGRAM(s) IV Push every 24 hours  folic acid 1 milliGRAM(s) Oral daily  influenza  Vaccine (HIGH DOSE) 0.5 milliLiter(s) IntraMuscular once  metoprolol succinate ER 50 milliGRAM(s) Oral at bedtime  montelukast 10 milliGRAM(s) Oral daily  multivitamin 1 Tablet(s) Oral daily  rivaroxaban 15 milliGRAM(s) Oral with dinner  vancomycin  IVPB 500 milliGRAM(s) IV Intermittent every 12 hours    MEDICATIONS  (PRN):  acetaminophen     Tablet .. 650 milliGRAM(s) Oral every 6 hours PRN Temp greater or equal to 38C (100.4F), Mild Pain (1 - 3)  aluminum hydroxide/magnesium hydroxide/simethicone Suspension 30 milliLiter(s) Oral every 4 hours PRN Dyspepsia  melatonin 3 milliGRAM(s) Oral at bedtime PRN Insomnia  ondansetron Injectable 4 milliGRAM(s) IV Push every 8 hours PRN Nausea and/or Vomiting    # Reviewed today's blood work which inclue CBC, BMP     #Reviewed today's imagine study myself -    #Discussed with other team member- ID- Dr. Dasilva -ct abx    #Discussed with pt in detial.        Total time spent 51 minutes

## 2024-12-25 NOTE — DISCHARGE NOTE PROVIDER - PROVIDER TOKENS
PROVIDER:[TOKEN:[[id number]],FOLLOWUP:[1 week]],PROVIDER:[TOKEN:[[id number]],FOLLOWUP:[1 week]]

## 2024-12-25 NOTE — DISCHARGE NOTE PROVIDER - NSDCFUADDINST_GEN_ALL_CORE_FT
Diet, Regular:   Supplement Feeding Modality:  Oral  Ensure Plus High Protein Cans or Servings Per Day:  1       Frequency:  Two Times a day (12-23-24 @ 12:33) [Pending Verification By Attending]  Diet, DASH/TLC:   Sodium & Cholesterol Restricted (12-23-24 @ 03:35) [Active]

## 2024-12-25 NOTE — DISCHARGE NOTE PROVIDER - NSDCMRMEDTOKEN_GEN_ALL_CORE_FT
allopurinol 100 mg oral tablet: 1 tab(s) orally 2 times a day  ammonium lactate 12% topical lotion: Apply topically to affected area once a day  doxycycline hyclate 100 mg oral tablet: 1 tab(s) orally 2 times a day  folic acid 1 mg oral tablet: 1 tab(s) orally once a day  furosemide 20 mg oral tablet: 1 tab(s) orally every other day  Metoprolol Succinate ER 50 mg oral tablet, extended release: 1 tab(s) orally once a day (at bedtime)  montelukast 10 mg oral tablet: 1 tab(s) orally once a day  Multiple Vitamins oral tablet: 1 tab(s) orally once a day  potassium chloride 10 mEq oral tablet, extended release: 1 tab(s) orally every other day with lasix  pravastatin 20 mg oral tablet: 1 tab(s) orally once a day (in the evening)  Silvadene 1% topical cream: Apply topically to affected area once a day as needed for  wound care  Tylenol 500 mg oral tablet: 2 tab(s) orally every 8 hours as needed for  Xarelto 15 mg oral tablet: 1 tab(s) orally every 12 hours Last day on 6/24/24  ZyrTEC 10 mg oral tablet: 1 tab(s) orally once a day

## 2024-12-28 LAB
CULTURE RESULTS: ABNORMAL
CULTURE RESULTS: SIGNIFICANT CHANGE UP
CULTURE RESULTS: SIGNIFICANT CHANGE UP
ORGANISM # SPEC MICROSCOPIC CNT: ABNORMAL
ORGANISM # SPEC MICROSCOPIC CNT: SIGNIFICANT CHANGE UP
SPECIMEN SOURCE: SIGNIFICANT CHANGE UP

## 2025-01-02 ENCOUNTER — APPOINTMENT (OUTPATIENT)
Age: 89
End: 2025-01-02

## 2025-01-02 VITALS — HEIGHT: 62 IN | WEIGHT: 119 LBS | BODY MASS INDEX: 21.9 KG/M2

## 2025-01-02 DIAGNOSIS — L03.115 CELLULITIS OF RIGHT LOWER LIMB: ICD-10-CM

## 2025-01-02 DIAGNOSIS — B35.1 TINEA UNGUIUM: ICD-10-CM

## 2025-01-02 DIAGNOSIS — I73.9 PERIPHERAL VASCULAR DISEASE, UNSPECIFIED: ICD-10-CM

## 2025-01-02 PROCEDURE — 99213 OFFICE O/P EST LOW 20 MIN: CPT | Mod: 25

## 2025-01-02 PROCEDURE — 11721 DEBRIDE NAIL 6 OR MORE: CPT

## 2025-01-03 DIAGNOSIS — I11.0 HYPERTENSIVE HEART DISEASE WITH HEART FAILURE: ICD-10-CM

## 2025-01-03 DIAGNOSIS — I87.2 VENOUS INSUFFICIENCY (CHRONIC) (PERIPHERAL): ICD-10-CM

## 2025-01-03 DIAGNOSIS — E78.5 HYPERLIPIDEMIA, UNSPECIFIED: ICD-10-CM

## 2025-01-03 DIAGNOSIS — I50.32 CHRONIC DIASTOLIC (CONGESTIVE) HEART FAILURE: ICD-10-CM

## 2025-01-03 DIAGNOSIS — B96.89 OTHER SPECIFIED BACTERIAL AGENTS AS THE CAUSE OF DISEASES CLASSIFIED ELSEWHERE: ICD-10-CM

## 2025-01-03 DIAGNOSIS — Z86.711 PERSONAL HISTORY OF PULMONARY EMBOLISM: ICD-10-CM

## 2025-01-03 DIAGNOSIS — L03.115 CELLULITIS OF RIGHT LOWER LIMB: ICD-10-CM

## 2025-01-03 DIAGNOSIS — I49.5 SICK SINUS SYNDROME: ICD-10-CM

## 2025-01-03 DIAGNOSIS — Z95.0 PRESENCE OF CARDIAC PACEMAKER: ICD-10-CM

## 2025-01-03 DIAGNOSIS — I48.0 PAROXYSMAL ATRIAL FIBRILLATION: ICD-10-CM

## 2025-01-03 DIAGNOSIS — L02.415 CUTANEOUS ABSCESS OF RIGHT LOWER LIMB: ICD-10-CM

## 2025-01-03 DIAGNOSIS — Z88.0 ALLERGY STATUS TO PENICILLIN: ICD-10-CM

## 2025-01-03 DIAGNOSIS — Z86.718 PERSONAL HISTORY OF OTHER VENOUS THROMBOSIS AND EMBOLISM: ICD-10-CM

## 2025-01-03 DIAGNOSIS — Z88.8 ALLERGY STATUS TO OTHER DRUGS, MEDICAMENTS AND BIOLOGICAL SUBSTANCES: ICD-10-CM

## 2025-01-03 DIAGNOSIS — L89.311 PRESSURE ULCER OF RIGHT BUTTOCK, STAGE 1: ICD-10-CM

## 2025-01-03 DIAGNOSIS — E43 UNSPECIFIED SEVERE PROTEIN-CALORIE MALNUTRITION: ICD-10-CM

## 2025-01-03 DIAGNOSIS — Z79.01 LONG TERM (CURRENT) USE OF ANTICOAGULANTS: ICD-10-CM

## 2025-01-27 ENCOUNTER — NON-APPOINTMENT (OUTPATIENT)
Age: 89
End: 2025-01-27

## 2025-01-27 ENCOUNTER — APPOINTMENT (OUTPATIENT)
Dept: ELECTROPHYSIOLOGY | Facility: CLINIC | Age: 89
End: 2025-01-27
Payer: MEDICARE

## 2025-01-27 VITALS
SYSTOLIC BLOOD PRESSURE: 120 MMHG | HEIGHT: 62 IN | HEART RATE: 62 BPM | DIASTOLIC BLOOD PRESSURE: 60 MMHG | OXYGEN SATURATION: 98 % | WEIGHT: 116 LBS | BODY MASS INDEX: 21.35 KG/M2

## 2025-01-27 PROCEDURE — 93279 PRGRMG DEV EVAL PM/LDLS PM: CPT

## 2025-02-21 NOTE — SWALLOW BEDSIDE ASSESSMENT ADULT - CONSISTENCIES ADMINISTERED
FYI:    Pt was seen in clinic for 1 week inc ck- [facility] on 2/12/25- sensing test showed VS 60s. Pt set to [id number]. No events noted and is Vping 97.2%    Will see CR for 1 month on 3/31/25      
thin liquid/mildly thick/pureed

## 2025-03-13 NOTE — DISCHARGE NOTE NURSING/CASE MANAGEMENT/SOCIAL WORK - NSDPACMPNY_GEN_ALL_CORE
Family
Quality 358: Patient-Centered Surgical Risk Assessment And Communication: Documentation of patient-specific risk assessment with a risk calculator based on multi-institutional clinical data, the specific risk calculator used, and communication of risk assessment from risk calculator with the patient or family.
Detail Level: Detailed

## 2025-04-28 ENCOUNTER — APPOINTMENT (OUTPATIENT)
Dept: ELECTROPHYSIOLOGY | Facility: CLINIC | Age: 89
End: 2025-04-28
Payer: MEDICARE

## 2025-04-28 ENCOUNTER — NON-APPOINTMENT (OUTPATIENT)
Age: 89
End: 2025-04-28

## 2025-04-28 VITALS
BODY MASS INDEX: 22.63 KG/M2 | HEIGHT: 62 IN | WEIGHT: 123 LBS | OXYGEN SATURATION: 100 % | DIASTOLIC BLOOD PRESSURE: 67 MMHG | HEART RATE: 63 BPM | SYSTOLIC BLOOD PRESSURE: 135 MMHG

## 2025-04-28 PROCEDURE — 93279 PRGRMG DEV EVAL PM/LDLS PM: CPT

## 2025-07-03 NOTE — PHYSICAL THERAPY INITIAL EVALUATION ADULT - WEIGHT-BEARING RESTRICTIONS: GAIT, REHAB EVAL
Pre visit planning completed.      Procedure details:    Patient scheduled for Colonoscopy on 7/22/25.     Arrival time: 0845. Procedure time 0930    Facility location: [facility]; [address] [place] [address] [place] [address]. Check in location: [facility].     Sedation type: Conscious sedation     Pre op exam needed? No.    Indication for procedure: screening       Chart review:     Electronic implanted devices? No    Recent diagnosis of diverticulitis within the last 6 weeks? No      Medication review:    Diabetic? No    Anticoagulants? No    Weight loss medication/injectable? Phentermine: HOLD 7 days before procedure.    Other medication HOLDING recommendations:  N/A      Prep for procedure:     Bowel prep recommendation: Standard Miralax.   Due to: standard bowel prep    Due to reduction in mailed letters prep instructions were not mailed. How do they want to receive prep instructions? Patient can go to [employer] website and select their correct prep, they can receive via email, or patient is not eligible for secure [employer] link.          Litzy Duvall RN  Endoscopy Procedure Pre Assessment   [phone] option 3   weight-bearing as tolerated